# Patient Record
Sex: MALE | Race: WHITE | NOT HISPANIC OR LATINO | Employment: UNEMPLOYED | URBAN - METROPOLITAN AREA
[De-identification: names, ages, dates, MRNs, and addresses within clinical notes are randomized per-mention and may not be internally consistent; named-entity substitution may affect disease eponyms.]

---

## 2017-03-08 ENCOUNTER — HOSPITAL ENCOUNTER (OUTPATIENT)
Dept: CT IMAGING | Facility: HOSPITAL | Age: 62
Discharge: HOME | End: 2017-03-08
Payer: COMMERCIAL

## 2017-03-08 DIAGNOSIS — Z90.5 ACQUIRED ABSENCE OF KIDNEY: ICD-10-CM

## 2017-03-08 PROCEDURE — 74176 CT ABD & PELVIS W/O CONTRAST: CPT

## 2017-11-27 ENCOUNTER — TRANSCRIBE ORDERS (OUTPATIENT)
Dept: ADMINISTRATIVE | Facility: HOSPITAL | Age: 62
End: 2017-11-27

## 2017-11-27 DIAGNOSIS — C64.1 MALIGNANT NEOPLASM OF RIGHT KIDNEY, EXCEPT RENAL PELVIS (HCC): Primary | ICD-10-CM

## 2017-12-01 ENCOUNTER — HOSPITAL ENCOUNTER (OUTPATIENT)
Dept: CT IMAGING | Facility: CLINIC | Age: 62
Discharge: HOME | End: 2017-12-01
Payer: COMMERCIAL

## 2017-12-01 ENCOUNTER — GENERIC CONVERSION - ENCOUNTER (OUTPATIENT)
Dept: OTHER | Facility: OTHER | Age: 62
End: 2017-12-01

## 2017-12-01 DIAGNOSIS — C64.1 MALIGNANT NEOPLASM OF RIGHT KIDNEY, EXCEPT RENAL PELVIS (HCC): ICD-10-CM

## 2017-12-01 PROCEDURE — 74176 CT ABD & PELVIS W/O CONTRAST: CPT

## 2017-12-01 PROCEDURE — 71250 CT THORAX DX C-: CPT

## 2018-01-11 NOTE — RESULT NOTES
Verified Results  (1923 [facility]) Lipid Panel [id number] 07:30AM Maria Del Rosario Be     Test Name Result Flag Reference   Cholesterol, Total 198 mg/dL  100-199   Triglycerides 112 mg/dL  0-149   HDL Cholesterol 28 mg/dL L >39   According to ATP-III Guidelines, HDL-C >59 mg/dL is considered a  negative risk factor for CHD     VLDL Cholesterol Melvin 22 mg/dL  5-40   LDL Cholesterol Calc 148 mg/dL H 0-99

## 2018-01-12 NOTE — MISCELLANEOUS
History of Present Illness  TCM Communication [facility]: The patient is being contacted for follow-up after hospitalization and Spoke to PT's wife Kendell Avila on Tuesday Jan 19,2016  He was hospitalized at [address]  The date of admission: Jan 13,2016, date of discharge: Jan 18,2016  Diagnosis: Right renal mass  He was discharged to home  Medications were not reviewed today  He did not schedule a follow up appointment  Follow-up appointments with other specialists: Dr Agusto Duran in Feb  The patient is currently asymptomatic  Communication performed and completed by ROXANNE Waters   HPI: According to spouse - PT's doing OK - the only real problem is he's constipated is on docusate sodium 100mg 1 tab three times daily - he's on pain MED and can't move his bowels - I put a message back to the nurse to address this issue  No other problems or concerns at this time - PT was seen by Dr Emily Bansal for Pre- OP and he has a follow up scheduled with the Surgeon's office  Active Problems    1  Hypertension (401 9) (I10)   2  Hypothyroidism (244 9) (E03 9)   3  Lung nodule, solitary (793 11) (R91 1)   4  Mild obstructive sleep apnea (327 23) (G47 33)   5  Obesity (278 00) (E66 9)   6  Preoperative examination (V72 84) (Z01 818)   7  Renal mass (593 9) (N28 89)    Past Medical History    1  History of Cellulitis, trunk (682 2) (L03 319)   2  Gastroenteritis and colitis, viral (008 8) (A08 4)   3  History of Mouth ulcers (528 9) (K12 1)   4  History of Snoring (786 09) (R06 83)    Surgical History    1  History of Inguinal Hernia Repair    Family History    1  Family history of Lung cancer   2  Family history of Melanoma   3  Family history of Renal insufficiency    Social History    · Caffeine use (V49 89) (F15 90)   · Cultural background   · Former smoker (V04 22) (N21 272)   ·    · Occasional alcohol use   · Primary spoken language English   · Racial background    Current Meds   1   Levoxyl 100 MCG Oral Tablet; TAKE 1 TABLET DAILY; Therapy: 06UKD3553 to (Evaluate:10Oda4508)  Requested for: 09VST8986; Last   Rx:08Jan2016 Ordered   2  Lisinopril-Hydrochlorothiazide 10-12 5 MG Oral Tablet; take one tablet by mouth daily; Therapy: 75SHC7355 to (Last Rx:05Jan2016)  Requested for: 17HMJ8026 Ordered   3  Metoprolol Tartrate 25 MG Oral Tablet; take one tablet by mouth twice daily; Therapy: 59VPE1830 to (Last Rx:05Jan2016)  Requested for: 22KMZ1438 Ordered    Allergies    1  No Known Drug Allergies    Future Appointments    Date/Time Provider Specialty Site   02/05/2016 10:30 AM MEERA [place]   Urology [address]     Signatures   Electronically signed by : MEERA Soni ; Jan 19 2016  7:38PM EST

## 2018-01-12 NOTE — MISCELLANEOUS
Message  Message Free Text Note Form: Patient had kidney resection two weeks ago at [facility]  Patient has had  persistent nausea and vomiting since that time  Can only keep liquids down  I spoke to  the patient's wife and advised him to go back to the ER at [facility] for evaluation  I believe he needs CT scan of the abdomen         Signatures   Electronically signed by : MEERA Preston ; Sep 26 2016  8:26AM EST

## 2018-01-13 NOTE — MISCELLANEOUS
Message  Return to work or school:    He is able to return to work on  02/22/2016       Caitlin Renteria MD       Signatures   Electronically signed by : MEERA Blancas ; Feb 14 2016  9:43AM EST

## 2018-01-14 NOTE — PROCEDURES
Procedures by Pallavi Bhatt PA-C at 1/15/2016  9:58 AM      Author:  Pallavi Bhatt PA-C Service:  Urology Author Type:  Physician Assistant    Filed:  1/15/2016 10:02 AM Date of Service:  1/15/2016  9:58 AM Status:  Attested    :  Pallavi Bhatt PA-C (Physician Assistant)  Cosigner:  Caitlin Renteria MD at 1/15/2016  2:45 PM      Pre-procedure Diagnoses:       1  H/O partial nephrectomy [Z98 89]                Post-procedure Diagnoses:       1  H/O partial nephrectomy [Z98 89]                Procedures:       1  DRAIN CARE [UYG618 (Custom)]              Attestation signed by Caitlin Renteria MD at 1/15/2016  2:45 PM           Agree with above  Called to d/c MARCIA drain  MARCIA drain removed without difficulty  DSD applied  Patient tolerated procedure well  Patient and wife instructed in routine drain site care              Received for:Mee Pizano [facility]  Alex 15 2016  2:45PM [facility] Standard Time

## 2018-01-16 NOTE — PROGRESS NOTES
Plan  Renal mass    · (1) BASIC METABOLIC PROFILE; Status:Active; Requested for:05May2016;    Perform:[facility]; UCN:[id number];RGHOPOR; For:Renal mass; Ordered By:Robert Meredith;   · CT ABDOMEN W WO CONTRAST; Status:Need Information - Financial Authorization; Requested for:08Qev0453;    Perform:[facility]; KWC:74XMN6947;ESUWMDV; For:Renal mass; Ordered By:Robert Meredith;  Renal mass, S/p nephrectomy    · Urine Dip Non-Automated- POC; Status:Complete - Retrospective By Protocol  Authorization;   Done: 77NWI6007 10:58AM   Performed: In Office; GFM:25JOC3461; Last Updated Bryant Roman; 2/5/2016 10:59:38 AM;Ordered; For:Renal mass, S/p nephrectomy; Ordered By:Ehsan Meredith; Discussion/Summary  Discussion Summary:   65-year-old male with atypical renal cysts status post right robotic partial nephrectomy  I reviewed the pathology with the patient  The final pathology showed atypical renal cysts  There was no evidence of malignancy  We will obtain a CT scan in 3 months  If this is normal we will follow up with one more CT scan in a year and discontinue any further follow-up if that looks fine as well  Chief Complaint  Chief Complaint Free Text Note Form: Patient presents for f/u s/p Right partial nephrectomy      History of Present Illness  HPI: 65-year-old male presents for follow-up after right robotic partial nephrectomy  He did well after the procedure  He had some constipation but otherwise no problems  He is tolerating regular diet with normal bowel movements now  Review of Systems  Complete-Male Urology:   Genitourinary: Empty sensation and stream quality good, but no dysuria, no urinary hesitancy, no hematuria and no feelings of urinary urgency    The patient presents with complaints of nocturia (1x)  Active Problems    1  Hypertension (401 9) (I10)   2  Hypothyroidism (244 9) (E03 9)   3  Lung nodule, solitary (793 11) (R91 1)   4   Mild obstructive sleep apnea (327 23) (G47 33) 5  Obesity (278 00) (E66 9)   6  Preoperative examination (V72 84) (Z01 818)   7  Renal mass (593 9) (N28 89)   8  S/p nephrectomy (V45 73) (Z90 5)    Past Medical History    1  History of Cellulitis, trunk (682 2) (L03 319)   2  Gastroenteritis and colitis, viral (008 8) (A08 4)   3  History of Mouth ulcers (528 9) (K12 1)   4  History of Snoring (786 09) (R06 83)    Surgical History    1  History of Inguinal Hernia Repair    Family History    1  Family history of Lung cancer   2  Family history of Melanoma   3  Family history of Renal insufficiency    Social History    · Caffeine use (V49 89) (F15 90)   · Cultural background   · Former smoker (M19 45) (S53 954)   ·    · Occasional alcohol use   · Primary spoken language English   · Racial background    Current Meds   1  Levoxyl 100 MCG Oral Tablet; TAKE 1 TABLET DAILY; Therapy: 67FYG5687 to (Evaluate:71Hqp1875)  Requested for: 59BUQ6891; Last   Rx:08Jan2016 Ordered   2  Lisinopril-Hydrochlorothiazide 10-12 5 MG Oral Tablet; take one tablet by mouth daily; Therapy: 92DVY3819 to (Last Rx:05Jan2016)  Requested for: 49MTV8993 Ordered   3  Metoprolol Tartrate 25 MG Oral Tablet; take one tablet by mouth twice daily; Therapy: 54OJM5922 to (Last Rx:05Jan2016)  Requested for: 64LXM5085 Ordered    Allergies    1  No Known Drug Allergies    Vitals  Vital Signs [Data Includes: Current Encounter]    Recorded: 38EZD3363 10:56AM   Heart Rate 64   Systolic 010   Diastolic 70   Height 5 ft 9 in   Weight 239 lb    BMI Calculated 35 29   BSA Calculated 2 23     Physical Exam    Constitutional   General appearance: No acute distress, well appearing and well nourished  Pulmonary   Respiratory effort: No increased work of breathing or signs of respiratory distress  Cardiovascular   Examination of extremities for edema and/or varicosities: Normal     Abdomen   Abdomen: Abnormal   Incisions healing well  Abdomen is soft, nondistended, and nontender     Liver and spleen: No hepatomegaly or splenomegaly      Musculoskeletal   Gait and station: Normal     Lymphatic   Palpation of lymph nodes in groin: Normal     Additional Exam:  Neuro exam nonfocal       Results/Data  Encounter Results   Urine Dip Non-Automated- POC 78BFN4551 10:58AM Shankar Barthel     Test Name Result Flag Reference   Color Yellow     Clarity Transparent     Leukocytes -     Nitrite -     Blood small     Protein -     Ph 5 0     Specific Gravity 1 010     Ketone -     Glucose -         Signatures   Electronically signed by : MEERA Orellana ; Feb 5 2016 11:17AM EST                       (Author)

## 2018-01-27 DIAGNOSIS — I10 ESSENTIAL HYPERTENSION: Primary | ICD-10-CM

## 2018-01-27 RX ORDER — LISINOPRIL AND HYDROCHLOROTHIAZIDE 12.5; 1 MG/1; MG/1
1 TABLET ORAL DAILY
Qty: 30 TABLET | Refills: 5 | Status: SHIPPED | OUTPATIENT
Start: 2018-01-27 | End: 2018-02-05 | Stop reason: SDUPTHER

## 2018-02-05 DIAGNOSIS — I10 ESSENTIAL HYPERTENSION: ICD-10-CM

## 2018-02-05 RX ORDER — LISINOPRIL AND HYDROCHLOROTHIAZIDE 12.5; 1 MG/1; MG/1
1 TABLET ORAL DAILY
Qty: 30 TABLET | Refills: 5 | Status: SHIPPED | OUTPATIENT
Start: 2018-02-05 | End: 2018-11-03 | Stop reason: SDUPTHER

## 2018-02-21 ENCOUNTER — OFFICE VISIT (OUTPATIENT)
Dept: FAMILY MEDICINE CLINIC | Facility: HOSPITAL | Age: 63
End: 2018-02-21
Payer: COMMERCIAL

## 2018-02-21 VITALS
RESPIRATION RATE: 16 BRPM | DIASTOLIC BLOOD PRESSURE: 68 MMHG | HEART RATE: 73 BPM | BODY MASS INDEX: 34.8 KG/M2 | SYSTOLIC BLOOD PRESSURE: 130 MMHG | WEIGHT: 235 LBS | HEIGHT: 69 IN

## 2018-02-21 DIAGNOSIS — D49.2 SKIN TUMOR: Primary | ICD-10-CM

## 2018-02-21 PROCEDURE — 3008F BODY MASS INDEX DOCD: CPT | Performed by: INTERNAL MEDICINE

## 2018-02-21 PROCEDURE — 99213 OFFICE O/P EST LOW 20 MIN: CPT | Performed by: INTERNAL MEDICINE

## 2018-02-21 NOTE — PROGRESS NOTES
Assessment/Plan:    No problem-specific Assessment & Plan notes found for this encounter  Diagnoses and all orders for this visit:    Skin tumor  -     Ambulatory referral to Dermatology; Future          Subjective:      Patient ID: Lito Bryan is a 60 y o  male  Rash   This is a new problem  The current episode started more than 1 year ago  The problem has been gradually worsening since onset  Pertinent negatives include no congestion, cough, diarrhea, fever, shortness of breath or vomiting  The following portions of the patient's history were reviewed and updated as appropriate: current medications, past family history, past medical history, past social history, past surgical history and problem list     Review of Systems   Constitutional: Negative for fever  HENT: Negative for congestion  Respiratory: Negative for cough, shortness of breath and wheezing  Cardiovascular: Negative for chest pain, palpitations and leg swelling  Gastrointestinal: Negative for abdominal pain, blood in stool, diarrhea, nausea and vomiting  Endocrine: Negative for polydipsia and polyphagia  Genitourinary: Negative for difficulty urinating and dysuria  Musculoskeletal: Negative for joint swelling and neck stiffness  Skin: Positive for rash  Neurological: Negative for weakness, numbness and headaches  Hematological: Does not bruise/bleed easily  Psychiatric/Behavioral: Negative for dysphoric mood  All other systems reviewed and are negative  Objective:    /68   Pulse 73   Resp 16   Ht 5' 9" (1 753 m)   Wt 107 kg (235 lb)   BMI 34 70 kg/m²      Physical Exam   Constitutional: He appears well-developed  No distress  HENT:   Head: Normocephalic  Cardiovascular: Normal rate and regular rhythm  Pulmonary/Chest: No respiratory distress  He has no wheezes  He has no rales  Lymphadenopathy:        Left: No inguinal adenopathy present     Skin:   Lobulated Skin tumor with stalk on the left medial thigh           Jose Luis Ceja MD

## 2018-02-24 DIAGNOSIS — I10 ESSENTIAL HYPERTENSION: ICD-10-CM

## 2018-04-30 ENCOUNTER — TELEPHONE (OUTPATIENT)
Dept: FAMILY MEDICINE CLINIC | Facility: HOSPITAL | Age: 63
End: 2018-04-30

## 2018-04-30 NOTE — TELEPHONE ENCOUNTER
Spoke with patient's wife  Gave her Dr Thuy Galarza'  Instructions  Told her if no better he should be seen

## 2018-04-30 NOTE — TELEPHONE ENCOUNTER
Pt was mowing the yard yesterday for the first time this season  His shoulder really hurts  He has tried cream and tylenol but has not had much relief  Is there anything else he can do or take for the shoulder pain

## 2018-05-02 ENCOUNTER — HOSPITAL ENCOUNTER (EMERGENCY)
Facility: HOSPITAL | Age: 63
Discharge: HOME | End: 2018-05-02
Attending: EMERGENCY MEDICINE | Admitting: EMERGENCY MEDICINE
Payer: COMMERCIAL

## 2018-05-02 ENCOUNTER — APPOINTMENT (EMERGENCY)
Dept: RADIOLOGY | Facility: HOSPITAL | Age: 63
End: 2018-05-02
Payer: COMMERCIAL

## 2018-05-02 VITALS
OXYGEN SATURATION: 98 % | HEART RATE: 64 BPM | SYSTOLIC BLOOD PRESSURE: 101 MMHG | WEIGHT: 245 LBS | BODY MASS INDEX: 36.18 KG/M2 | RESPIRATION RATE: 17 BRPM | DIASTOLIC BLOOD PRESSURE: 63 MMHG | TEMPERATURE: 98 F

## 2018-05-02 DIAGNOSIS — J44.9 COPD (CHRONIC OBSTRUCTIVE PULMONARY DISEASE) (HCC): Primary | ICD-10-CM

## 2018-05-02 LAB
ALBUMIN SERPL BCP-MCNC: 3.9 G/DL (ref 3.5–5)
ALP SERPL-CCNC: 64 U/L (ref 46–116)
ALT SERPL W P-5'-P-CCNC: 34 U/L (ref 12–78)
ANION GAP SERPL CALCULATED.3IONS-SCNC: 11 MMOL/L (ref 4–13)
AST SERPL W P-5'-P-CCNC: 16 U/L (ref 5–45)
ATRIAL RATE: 65 BPM
BASE EXCESS BLDA CALC-SCNC: 0 MMOL/L (ref -2–3)
BASOPHILS # BLD AUTO: 0.03 THOUSANDS/ΜL (ref 0–0.1)
BASOPHILS NFR BLD AUTO: 0 % (ref 0–1)
BILIRUB SERPL-MCNC: 0.4 MG/DL (ref 0.2–1)
BUN SERPL-MCNC: 27 MG/DL (ref 5–25)
CALCIUM SERPL-MCNC: 8.4 MG/DL (ref 8.3–10.1)
CHLORIDE SERPL-SCNC: 104 MMOL/L (ref 100–108)
CO2 SERPL-SCNC: 26 MMOL/L (ref 21–32)
CREAT SERPL-MCNC: 1.88 MG/DL (ref 0.6–1.3)
DEPRECATED D DIMER PPP: 309 NG/ML (FEU) (ref 0–424)
EOSINOPHIL # BLD AUTO: 0.16 THOUSAND/ΜL (ref 0–0.61)
EOSINOPHIL NFR BLD AUTO: 2 % (ref 0–6)
ERYTHROCYTE [DISTWIDTH] IN BLOOD BY AUTOMATED COUNT: 13.8 % (ref 11.6–15.1)
FIO2 GAS DIL.REBREATH: 21 L
GFR SERPL CREATININE-BSD FRML MDRD: 37 ML/MIN/1.73SQ M
GLUCOSE SERPL-MCNC: 106 MG/DL (ref 65–140)
HCO3 BLDA-SCNC: 24.4 MMOL/L (ref 22–28)
HCT VFR BLD AUTO: 46 % (ref 36.5–49.3)
HGB BLD-MCNC: 14.9 G/DL (ref 12–17)
LYMPHOCYTES # BLD AUTO: 1.66 THOUSANDS/ΜL (ref 0.6–4.47)
LYMPHOCYTES NFR BLD AUTO: 19 % (ref 14–44)
MCH RBC QN AUTO: 29.1 PG (ref 26.8–34.3)
MCHC RBC AUTO-ENTMCNC: 32.4 G/DL (ref 31.4–37.4)
MCV RBC AUTO: 90 FL (ref 82–98)
MONOCYTES # BLD AUTO: 0.98 THOUSAND/ΜL (ref 0.17–1.22)
MONOCYTES NFR BLD AUTO: 11 % (ref 4–12)
NEUTROPHILS # BLD AUTO: 5.92 THOUSANDS/ΜL (ref 1.85–7.62)
NEUTS SEG NFR BLD AUTO: 68 % (ref 43–75)
NT-PROBNP SERPL-MCNC: 101 PG/ML
P AXIS: 81 DEGREES
PCO2 BLD: 25 MMOL/L (ref 21–32)
PCO2 BLD: 37.2 MM HG (ref 36–44)
PH BLD: 7.42 [PH] (ref 7.35–7.45)
PLATELET # BLD AUTO: 254 THOUSANDS/UL (ref 149–390)
PMV BLD AUTO: 11 FL (ref 8.9–12.7)
PO2 BLD: 64 MM HG (ref 75–129)
POTASSIUM SERPL-SCNC: 4.4 MMOL/L (ref 3.5–5.3)
PR INTERVAL: 166 MS
PROT SERPL-MCNC: 7.4 G/DL (ref 6.4–8.2)
QRS AXIS: 270 DEGREES
QRSD INTERVAL: 106 MS
QT INTERVAL: 404 MS
QTC INTERVAL: 420 MS
RBC # BLD AUTO: 5.12 MILLION/UL (ref 3.88–5.62)
SAO2 % BLD FROM PO2: 93 % (ref 95–98)
SODIUM SERPL-SCNC: 141 MMOL/L (ref 136–145)
SPECIMEN SOURCE: ABNORMAL
T WAVE AXIS: 68 DEGREES
TROPONIN I SERPL-MCNC: <0.02 NG/ML
VENTRICULAR RATE: 65 BPM
WBC # BLD AUTO: 8.75 THOUSAND/UL (ref 4.31–10.16)

## 2018-05-02 PROCEDURE — 36415 COLL VENOUS BLD VENIPUNCTURE: CPT | Performed by: EMERGENCY MEDICINE

## 2018-05-02 PROCEDURE — 93005 ELECTROCARDIOGRAM TRACING: CPT

## 2018-05-02 PROCEDURE — 36600 WITHDRAWAL OF ARTERIAL BLOOD: CPT

## 2018-05-02 PROCEDURE — 99285 EMERGENCY DEPT VISIT HI MDM: CPT

## 2018-05-02 PROCEDURE — 93010 ELECTROCARDIOGRAM REPORT: CPT | Performed by: INTERNAL MEDICINE

## 2018-05-02 PROCEDURE — 80053 COMPREHEN METABOLIC PANEL: CPT | Performed by: EMERGENCY MEDICINE

## 2018-05-02 PROCEDURE — 82803 BLOOD GASES ANY COMBINATION: CPT

## 2018-05-02 PROCEDURE — 71045 X-RAY EXAM CHEST 1 VIEW: CPT

## 2018-05-02 PROCEDURE — 94640 AIRWAY INHALATION TREATMENT: CPT

## 2018-05-02 PROCEDURE — 85379 FIBRIN DEGRADATION QUANT: CPT | Performed by: EMERGENCY MEDICINE

## 2018-05-02 PROCEDURE — 85025 COMPLETE CBC W/AUTO DIFF WBC: CPT | Performed by: EMERGENCY MEDICINE

## 2018-05-02 PROCEDURE — 84484 ASSAY OF TROPONIN QUANT: CPT | Performed by: EMERGENCY MEDICINE

## 2018-05-02 PROCEDURE — 83880 ASSAY OF NATRIURETIC PEPTIDE: CPT | Performed by: EMERGENCY MEDICINE

## 2018-05-02 RX ORDER — PREDNISONE 20 MG/1
40 TABLET ORAL DAILY
Qty: 8 TABLET | Refills: 0 | Status: SHIPPED | OUTPATIENT
Start: 2018-05-02 | End: 2018-05-06

## 2018-05-02 RX ORDER — ALBUTEROL SULFATE 2.5 MG/3ML
5 SOLUTION RESPIRATORY (INHALATION) ONCE
Status: COMPLETED | OUTPATIENT
Start: 2018-05-02 | End: 2018-05-02

## 2018-05-02 RX ADMIN — IPRATROPIUM BROMIDE 0.5 MG: 0.5 SOLUTION RESPIRATORY (INHALATION) at 13:39

## 2018-05-02 RX ADMIN — ALBUTEROL SULFATE 5 MG: 2.5 SOLUTION RESPIRATORY (INHALATION) at 13:39

## 2018-05-02 NOTE — ED PROCEDURE NOTE
PROCEDURE  ECG 12 Lead Documentation  Date/Time: 5/2/2018 1:45 PM  Performed by: Marsha Conroy  Authorized by: Marsha Conroy     ECG reviewed by me, the ED Provider: yes    Patient location:  ED  Previous ECG:     Comparison to cardiac monitor: No    Interpretation:     Interpretation: abnormal    Rate:     ECG rate:  65    ECG rate assessment: normal    Rhythm:     Rhythm: sinus rhythm    Ectopy:     Ectopy: none    QRS:     QRS axis:  Right  Conduction:     Conduction: abnormal      Abnormal conduction: incomplete RBBB    ST segments:     ST segments:  Normal  T waves:     T waves: normal    Comments:      Yanira Tena MD  05/02/18 7242

## 2018-05-02 NOTE — ED NOTES
Appears comfortable  Patient states that he stopped smoking 4 years ago  States that he has not been told that he has COPD   Has noted periods of SOB in the past      Kayleigh Sher RN  05/02/18 0567

## 2018-05-02 NOTE — ED PROVIDER NOTES
History  Chief Complaint   Patient presents with    Shortness of Breath     To ED with c/o worsening SOB for 30 minutes while working on stairs  Denies any recent illness, cough, swelling, CP  Pt c/o SOB today, feels like he can't catch his breath; no c/o pain  Started 1 hour ago  History provided by:  Patient   used: No    Shortness of Breath   Severity:  Moderate  Onset quality:  Sudden  Duration:  1 hour  Timing:  Constant  Progression:  Unchanged  Chronicity:  New  Relieved by:  Nothing  Worsened by:  Exertion  Ineffective treatments:  None tried      Prior to Admission Medications   Prescriptions Last Dose Informant Patient Reported? Taking?   levothyroxine (LEVOTHROID) 88 mcg tablet   Yes No   Sig: Take 88 mcg by mouth daily     lisinopril-hydrochlorothiazide (PRINZIDE,ZESTORETIC) 10-12 5 MG per tablet   No No   Sig: take 1 tablet by mouth daily   metoprolol tartrate (LOPRESSOR) 25 mg tablet   No No   Sig: take 1 tablet by mouth twice a day      Facility-Administered Medications: None       Past Medical History:   Diagnosis Date    Cellulitis, trunk     Disease of thyroid gland     Gastroenteritis and colitis, viral     last assessed: 11/24/2015    Hernia     Hypertension     Lung mass     Renal mass        Past Surgical History:   Procedure Laterality Date    ABDOMINAL ADHESION SURGERY N/A 1/13/2016    Procedure: LYSIS ADHESIONS;  Surgeon: Nancy Guillen MD;  [facility]: [facility];  Service:     INGUINAL HERNIA REPAIR      LYMPH NODE DISSECTION N/A 1/13/2016    Procedure: DISSECTION/STAGING LYMPH NODE PELVIS/ABDOMEN;  Surgeon: Nancy Guillen MD;  Location: [facility];  Service:     DC LAP,PARTIAL NEPHRECTOMY Right 1/13/2016    Procedure: NEPHRECTOMY PARTIAL LAPAROSCOPIC W ROBOTICS, intra op ultrasound,laparoscopic lysis of adhesions;  Surgeon: Nancy Guillen MD;  Location: [facility];  Service: Urology       Family History   Problem Relation Age of Onset    Lung cancer Father     Melanoma Father     Other Father      renal insufficiency    Dementia Mother     Mental illness Neg Hx     Substance Abuse Neg Hx      I have reviewed and agree with the history as documented  Social History   Substance Use Topics    Smoking status: Former Smoker    Smokeless tobacco: Former User      Comment: not in last 12 months  quit smoking arnd 6/2013, used to smoke about 1 pack per week    Alcohol use Yes      Comment: social  ocasional        Review of Systems   Respiratory: Positive for shortness of breath  All other systems reviewed and are negative  Physical Exam  ED Triage Vitals   Temperature Pulse Respirations Blood Pressure SpO2   05/02/18 1305 05/02/18 1305 05/02/18 1305 05/02/18 1305 05/02/18 1305   98 °F (36 7 °C) 67 20 125/64 98 %      Temp Source Heart Rate Source Patient Position - Orthostatic VS BP Location FiO2 (%)   05/02/18 1305 05/02/18 1305 05/02/18 1305 05/02/18 1305 --   Temporal Monitor Sitting Right arm       Pain Score       05/02/18 1300       No Pain           Orthostatic Vital Signs  Vitals:    05/02/18 1400 05/02/18 1415 05/02/18 1430 05/02/18 1445   BP: 121/70 111/70 95/52 101/63   Pulse: 64 65 66 64   Patient Position - Orthostatic VS:           Physical Exam   Constitutional: He is oriented to person, place, and time  He appears well-developed and well-nourished  HENT:   Nose: Nose normal    Eyes: Pupils are equal, round, and reactive to light  Neck: Normal range of motion  Cardiovascular: Normal rate and regular rhythm  Pulmonary/Chest: Effort normal and breath sounds normal    Abdominal: Soft  Neurological: He is alert and oriented to person, place, and time  Skin: Skin is warm and dry  Psychiatric: He has a normal mood and affect  Judgment and thought content normal    Nursing note and vitals reviewed        ED Medications  Medications   albuterol inhalation solution 5 mg (5 mg Nebulization Given 5/2/18 7484)   ipratropium (ATROVENT) 0 02 % inhalation solution 0 5 mg (0 5 mg Nebulization Given 5/2/18 7689)       Diagnostic Studies  Results Reviewed     Procedure Component Value Units Date/Time    B-type natriuretic peptide [[id number]]  (Normal) Collected:  05/02/18 1312    Lab Status:  Final result Specimen:  Blood from Arm, Left Updated:  05/02/18 1346     NT-proBNP 101 pg/mL     Comprehensive metabolic panel [[id number]]  (Abnormal) Collected:  05/02/18 1312    Lab Status:  Final result Specimen:  Blood from Arm, Left Updated:  05/02/18 1344     Sodium 141 mmol/L      Potassium 4 4 mmol/L      Chloride 104 mmol/L      CO2 26 mmol/L      Anion Gap 11 mmol/L      BUN 27 (H) mg/dL      Creatinine 1 88 (H) mg/dL      Glucose 106 mg/dL      Calcium 8 4 mg/dL      AST 16 U/L      ALT 34 U/L      Alkaline Phosphatase 64 U/L      Total Protein 7 4 g/dL      Albumin 3 9 g/dL      Total Bilirubin 0 40 mg/dL      eGFR 37 ml/min/1 73sq m     Narrative:         National Kidney Disease Education Program recommendations are as follows:  GFR calculation is accurate only with a steady state creatinine  Chronic Kidney disease less than 60 ml/min/1 73 sq  meters  Kidney failure less than 15 ml/min/1 73 sq  meters  Troponin I [[id number]]  (Normal) Collected:  05/02/18 1312    Lab Status:  Final result Specimen:  Blood from Arm, Left Updated:  05/02/18 1342     Troponin I <0 02 ng/mL     Narrative:         Siemens Chemistry analyzer 99% cutoff is > 0 04 ng/mL in network labs    o cTnI 99% cutoff is useful only when applied to patients in the clinical setting of myocardial ischemia  o cTnI 99% cutoff should be interpreted in the context of clinical history, ECG findings and possibly cardiac imaging to establish correct diagnosis  o cTnI 99% cutoff may be suggestive but clearly not indicative of a coronary event without the clinical setting of myocardial ischemia      D-Dimer [[id number]]  (Normal) Collected:  05/02/18 1312    Lab Status:  Final result Specimen:  Blood from Arm, Left Updated:  05/02/18 1340     D-Dimer, Quant 309 ng/ml (FEU)     POCT Blood Gas (G3+) [[id number]]  (Abnormal) Collected:  05/02/18 1333    Lab Status:  Final result Updated:  05/02/18 1337     pH, Art i-STAT 7 424     pCO2, Art i-STAT 37 2 mm HG      pO2, ART i-STAT 64 0 (L) mm HG      BE, i-STAT 0 mmol/L      HCO3, Art i-STAT 24 4 mmol/L      CO2, i-STAT 25 mmol/L      O2 Sat, i-STAT 93 (L) %      POC FIO2 21 L      Specimen Type ARTERIAL    CBC and differential [[id number]]  (Normal) Collected:  05/02/18 1312    Lab Status:  Final result Specimen:  Blood from Arm, Left Updated:  05/02/18 1322     WBC 8 75 Thousand/uL      RBC 5 12 Million/uL      Hemoglobin 14 9 g/dL      Hematocrit 46 0 %      MCV 90 fL      MCH 29 1 pg      MCHC 32 4 g/dL      RDW 13 8 %      MPV 11 0 fL      Platelets 794 Thousands/uL      Neutrophils Relative 68 %      Lymphocytes Relative 19 %      Monocytes Relative 11 %      Eosinophils Relative 2 %      Basophils Relative 0 %      Neutrophils Absolute 5 92 Thousands/µL      Lymphocytes Absolute 1 66 Thousands/µL      Monocytes Absolute 0 98 Thousand/µL      Eosinophils Absolute 0 16 Thousand/µL      Basophils Absolute 0 03 Thousands/µL                  XR chest 1 view portable    (Results Pending)              Procedures  Procedures       Phone Contacts  ED Phone Contact    ED Course                               MDM  Number of Diagnoses or Management Options  COPD (chronic obstructive pulmonary disease) ([facility] 75 ): new and requires workup     Amount and/or Complexity of Data Reviewed  Clinical lab tests: reviewed and ordered  Tests in the radiology section of CPT®: ordered and reviewed    Patient Progress  Patient progress: stable    CritCare Time    Disposition  Final diagnoses:   [facility] ([facility] )     Time reflects when diagnosis was documented in both MDM as applicable and the Disposition within this note     Time User Action Codes Description Comment    5/2/2018  2:50 PM Caitlinrandellviki Dolanjoan MACIAS Add [J44 9] COPD (chronic obstructive pulmonary disease) [facility])       ED Disposition     ED Disposition Condition Comment    Discharge  Alisson Briceño discharge to home/self care  Condition at discharge: Stable        Follow-up Information     Follow up With Specialties Details Why [address] Veda Pulido MD Internal Medicine Call today for follow-up Raphaelosito  [address] [facility]  [id number] [place] [address] [address] [facility]          Discharge Medication List as of 5/2/2018  2:53 PM      START taking these medications    Details   ipratropium-albuterol (COMBIVENT RESPIMAT) inhaler Inhale 1 puff 4 (four) times a day, Starting Wed 5/2/2018, Normal      predniSONE 20 mg tablet Take 2 tablets (40 mg total) by mouth daily for 4 days, Starting Wed 5/2/2018, Until Sun 5/6/2018, Normal         CONTINUE these medications which have NOT CHANGED    Details   levothyroxine (LEVOTHROID) 88 mcg tablet Take 88 mcg by mouth daily  , Until Discontinued, Historical Med      lisinopril-hydrochlorothiazide (PRINZIDE,ZESTORETIC) 10-12 5 MG per tablet take 1 tablet by mouth daily, Starting Mon 2/5/2018, Normal      metoprolol tartrate (LOPRESSOR) 25 mg tablet take 1 tablet by mouth twice a day, Normal           No discharge procedures on file      ED Provider  Electronically Signed by           Buddy Sweet MD  05/02/18 560

## 2018-05-02 NOTE — ED NOTES
Patient is resting comfortably  Nebulizer completed  Apptears more comfortable  Denies any CP at this time and feels less SOB       Ben Bauman RN  05/02/18 3862

## 2018-05-02 NOTE — DISCHARGE INSTRUCTIONS
COPD (Chronic Obstructive Pulmonary Disease)   WHAT YOU NEED TO KNOW:   Chronic obstructive pulmonary disease (COPD) is a lung disease that makes it hard for you to breathe  It is usually a result of lung damage caused by years of irritation and inflammation in your lungs  DISCHARGE INSTRUCTIONS:   Call 911 if:   · You feel lightheaded, short of breath, and have chest pain  Return to the emergency department if:   · You are confused, dizzy, or feel faint  · Your arm or leg feels warm, tender, and painful  It may look swollen and red  · You cough up blood  Contact your healthcare provider if:   · You have more shortness of breath than usual      · You need more medicine than usual to control your symptoms  · You are coughing or wheezing more than usual      · You are coughing up more mucus, or it is a different color or has a different odor  · You gain more than 3 pounds in a week  · You have a fever, a runny or stuffy nose, and a sore throat, or other cold or flu symptoms  · Your skin, lips, or nails start to turn blue  · You have swelling in your legs or ankles  · You are very tired or weak for more than a day  · You notice changes in your mood, or changes in your ability to think or concentrate  · You have questions or concerns about your condition or care  Medicines:   · Medicines  may be used to open your airways, decrease swelling and inflammation in your lungs, or treat an infection  You may need 2 or more medicines  A short-acting medicine relieves symptoms quickly  Long-acting medicines will control or prevent symptoms  Ask for more information about the medicines you are given and how to use them safely  · Take your medicine as directed  Contact your healthcare provider if you think your medicine is not helping or if you have side effects  Tell him or her if you are allergic to any medicine  Keep a list of the medicines, vitamins, and herbs you take  Include the amounts, and when and why you take them  Bring the list or the pill bottles to follow-up visits  Carry your medicine list with you in case of an emergency  Help make breathing easier:   · Use pursed-lip breathing any time you feel short of breath  Take a deep breath in through your nose  Slowly breathe out through your mouth with your lips pursed for twice as long as you inhaled  You can also practice this breathing pattern while you bend, lift, climb stairs, or exercise  It slows down your breathing and helps move more air in and out of your lungs  · Do not smoke, and avoid others who smoke  Nicotine and other substances can cause lung irritation or damage and make it harder for you to breathe  Do not use e-cigarettes or smokeless tobacco  They still contain nicotine  Ask your healthcare provider for information if you currently smoke and need help to quit  For support and more information:  ¨ [employer]  Phone: 3- 019 - 042-8514  Web Address: [employer]      · Be aware of and avoid anything that makes your symptoms worse  Stay out of high altitudes and places with high humidity  Stay inside, or cover your mouth and nose with a scarf when you are outside during cold weather  Stay inside on days when air pollution or pollen counts are high  Do not use aerosol sprays such as deodorant, bug spray, and hair spray  Manage COPD and help prevent exacerbations:  COPD is a serious condition that gets worse over time  A COPD exacerbation means your symptoms suddenly get worse  It is important to prevent exacerbations  An exacerbation can cause more lung damage  COPD cannot be cured, but you can take action to feel better and prevent COPD exacerbations:  · Protect yourself from germs  Germs can get into your lungs and cause an infection  An infection in your lungs can create more mucus and make it harder to breathe   An infection can also create swelling in your airways and prevent air from getting in  You can decrease your risk for infection by doing the following:     [facility] your hands often with soap and water  Carry germ-killing gel with you  You can use the gel to clean your hands when soap and water are not available  ¨ Do not touch your eyes, nose, or mouth unless you have washed your hands first      ¨ Always cover your mouth when you cough  Cough into a tissue or your shirtsleeve so you do not spread germs from your hands  ¨ Try to avoid people who have a cold or the flu  If you are sick, stay away from others as much as possible  · Drink more liquids  This will help to keep your air passages moist and help you cough up mucus  Ask how much liquid to drink each day and which liquids are best for you  · Exercise daily  Exercise for at least 20 minutes each day to help increase your energy and decrease shortness of breath  Walking or riding a bike are good ways to exercise  Talk to your healthcare provider about the best exercise plan for you  · Ask about vaccines  Your healthcare provider may recommend that you get regular flu and pneumonia vaccines  Pneumonia can become life-threatening for a person who has COPD  Ask about other vaccines you may need  Ask your healthcare provider about the flu and pneumonia vaccines  All adults should get the flu (influenza) vaccine every year as soon as it becomes available  The pneumonia vaccine is given to adults aged 72 or older to prevent pneumococcal disease, such as pneumonia  Adults aged 23 to 59 years who are at high risk for pneumococcal disease also should get the pneumococcal vaccine  It may need to be repeated 1 or 5 years later  Pulmonary rehabilitation:  Your healthcare provider may recommend a program to help you manage your symptoms and improve your quality of life  It may include nutritional counseling and exercise to strengthen your lungs     Make decisions about your choices for future treatment:  Ask for information about advanced medical directives and living lopez  These documents help you decide and write down your choices for treatment and end-of-life care  It is best to complete them when you feel well and can think clearly about your wishes  The information can then be kept for future use if you are in the hospital or become very ill  Follow up with your healthcare provider as directed: You may need more tests  Your healthcare provider may refer you to a pulmonary (lung) specialist  Write down your questions so you remember to ask them during your visits  © 2017 [facility] Information is for End User's use only and may not be sold, redistributed or otherwise used for commercial purposes  All illustrations and images included in CareNotes® are the copyrighted property of [employer] M [employer]  or [employer]  The above information is an  only  It is not intended as medical advice for individual conditions or treatments  Talk to your doctor, nurse or pharmacist before following any medical regimen to see if it is safe and effective for you    Prednisone once a day, use inhaler 4 x a day, call Dr Aiden Hawkins for follow-up

## 2018-05-02 NOTE — ED NOTES
Nebulizer treatment initiated by Respiratory Therapy  Patient appears in no acute distress  Awaiting results       Pennie Pedroza RN  05/02/18 [id number]

## 2018-07-05 DIAGNOSIS — E03.8 OTHER SPECIFIED HYPOTHYROIDISM: Primary | ICD-10-CM

## 2018-07-06 RX ORDER — LEVOTHYROXINE SODIUM 0.1 MG/1
TABLET ORAL
Qty: 30 TABLET | Refills: 5 | Status: SHIPPED | OUTPATIENT
Start: 2018-07-06 | End: 2019-07-19 | Stop reason: SDUPTHER

## 2018-11-03 DIAGNOSIS — I10 ESSENTIAL HYPERTENSION: ICD-10-CM

## 2018-11-04 RX ORDER — LISINOPRIL AND HYDROCHLOROTHIAZIDE 12.5; 1 MG/1; MG/1
TABLET ORAL
Qty: 30 TABLET | Refills: 5 | Status: SHIPPED | OUTPATIENT
Start: 2018-11-04 | End: 2019-11-16 | Stop reason: SDUPTHER

## 2018-12-05 ENCOUNTER — HOSPITAL ENCOUNTER (OUTPATIENT)
Dept: CT IMAGING | Facility: CLINIC | Age: 63
Discharge: HOME | End: 2018-12-05
Payer: COMMERCIAL

## 2018-12-05 ENCOUNTER — APPOINTMENT (OUTPATIENT)
Dept: RADIOLOGY | Facility: CLINIC | Age: 63
End: 2018-12-05
Payer: COMMERCIAL

## 2018-12-05 ENCOUNTER — TRANSCRIBE ORDERS (OUTPATIENT)
Dept: ADMINISTRATIVE | Facility: HOSPITAL | Age: 63
End: 2018-12-05

## 2018-12-05 DIAGNOSIS — C64.1 MALIGNANT NEOPLASM OF RIGHT KIDNEY, EXCEPT RENAL PELVIS (HCC): ICD-10-CM

## 2018-12-05 DIAGNOSIS — C64.1 MALIGNANT NEOPLASM OF RIGHT KIDNEY, EXCEPT RENAL PELVIS (HCC): Primary | ICD-10-CM

## 2018-12-05 PROCEDURE — 74176 CT ABD & PELVIS W/O CONTRAST: CPT

## 2018-12-05 PROCEDURE — 71046 X-RAY EXAM CHEST 2 VIEWS: CPT

## 2018-12-20 ENCOUNTER — TRANSCRIBE ORDERS (OUTPATIENT)
Dept: ADMINISTRATIVE | Facility: HOSPITAL | Age: 63
End: 2018-12-20

## 2018-12-20 DIAGNOSIS — N28.89 RENAL MASS: Primary | ICD-10-CM

## 2019-01-23 ENCOUNTER — HOSPITAL ENCOUNTER (EMERGENCY)
Facility: HOSPITAL | Age: 64
Discharge: HOME | End: 2019-01-23
Attending: EMERGENCY MEDICINE | Admitting: EMERGENCY MEDICINE
Payer: COMMERCIAL

## 2019-01-23 VITALS
WEIGHT: 244.71 LBS | RESPIRATION RATE: 20 BRPM | HEART RATE: 78 BPM | TEMPERATURE: 98.1 F | DIASTOLIC BLOOD PRESSURE: 70 MMHG | SYSTOLIC BLOOD PRESSURE: 152 MMHG | BODY MASS INDEX: 36.14 KG/M2 | OXYGEN SATURATION: 99 %

## 2019-01-23 DIAGNOSIS — H10.9 CONJUNCTIVITIS: Primary | ICD-10-CM

## 2019-01-23 PROCEDURE — 99283 EMERGENCY DEPT VISIT LOW MDM: CPT

## 2019-01-23 RX ORDER — TOBRAMYCIN 3 MG/ML
1 SOLUTION/ DROPS OPHTHALMIC ONCE
Status: COMPLETED | OUTPATIENT
Start: 2019-01-23 | End: 2019-01-23

## 2019-01-23 RX ORDER — TETRACAINE HYDROCHLORIDE 5 MG/ML
1 SOLUTION OPHTHALMIC ONCE
Status: COMPLETED | OUTPATIENT
Start: 2019-01-23 | End: 2019-01-23

## 2019-01-23 RX ADMIN — TOBRAMYCIN 1 DROP: 3 SOLUTION OPHTHALMIC at 08:52

## 2019-01-23 RX ADMIN — FLUORESCEIN SODIUM 1 STRIP: 1 STRIP OPHTHALMIC at 08:35

## 2019-01-23 RX ADMIN — TETRACAINE HYDROCHLORIDE 1 DROP: 5 SOLUTION OPHTHALMIC at 08:35

## 2019-01-23 NOTE — DISCHARGE INSTRUCTIONS
Use the eye drops as follows:  1 drop in the left eye every 2 hours while awake today and then 1 drop three times a day for 5 additional days  You need to follow up with an eye specialist in the next 5-7 days to have the pressure in your eye re-checked  The pressure today was 20 which is at the high end of normal     Conjunctivitis   WHAT YOU NEED TO KNOW:   Conjunctivitis, or pink eye, is inflammation of your conjunctiva  The conjunctiva is a thin tissue that covers the front of your eye and the back of your eyelids  The conjunctiva helps protect your eye and keep it moist  Conjunctivitis may be caused by bacteria, allergies, or a virus  If your conjunctivitis is caused by bacteria, it may get better on its own in about 7 days  Viral conjunctivitis can last up to 3 weeks  DISCHARGE INSTRUCTIONS:   Return to the emergency department if:   · You have worsening eye pain  · The swelling in your eye gets worse, even after treatment  · Your vision suddenly becomes worse or you cannot see at all  Contact your healthcare provider if:   · You develop a fever and ear pain  · You have tiny bumps or spots of blood on your eye  · You have questions or concerns about your condition or care  Manage your symptoms:   · Apply a cool compress  Wet a washcloth with cold water and place it on your eye  This will help decrease itching and irritation  · Do not wear contact lenses  They can irritate your eye  Throw away the pair you are using and ask when you can wear them again  Use a new pair of lenses when your healthcare provider says it is okay  · Avoid irritants  Stay away from smoke filled areas  Shield your eyes from wind and sun  · Flush your eye  You may need to flush your eye with saline to help decrease your symptoms  Ask for more information on how to flush your eye  Medicines:  Treatment depends on what is causing your conjunctivitis   You may be given any of the following:  · Allergy medicine  helps decrease itchy, red, swollen eyes caused by allergies  It may be given as a pill, eye drops, or nasal spray  · Antibiotics  may be needed if your conjunctivitis is caused by bacteria  This medicine may be given as a pill, eye drops, or eye ointment  · Take your medicine as directed  Contact your healthcare provider if you think your medicine is not helping or if you have side effects  Tell him or her if you are allergic to any medicine  Keep a list of the medicines, vitamins, and herbs you take  Include the amounts, and when and why you take them  Bring the list or the pill bottles to follow-up visits  Carry your medicine list with you in case of an emergency  Prevent the spread of conjunctivitis:   · Wash your hands with soap and water often  Wash your hands before and after you touch your eyes  Also wash your hands before you prepare or eat food and after you use the bathroom or change a diaper  · Avoid allergens  Try to avoid the things that cause your allergies, such as pets, dust, or grass  · Avoid contact with others  Do not share towels or washcloths  Try to stay away from others as much as possible  Ask when you can return to work or school  · Throw away eye makeup  The bacteria that caused your conjunctivitis can stay in eye makeup  Throw away mascara and other eye makeup  © 2017 [address] [employer] Information is for End User's use only and may not be sold, redistributed or otherwise used for commercial purposes  All illustrations and images included in CareNotes® are the copyrighted property of [employer] M [employer] Inc  or [employer]  The above information is an  only  It is not intended as medical advice for individual conditions or treatments  Talk to your doctor, nurse or pharmacist before following any medical regimen to see if it is safe and effective for you

## 2019-01-23 NOTE — ED PROVIDER NOTES
History  Chief Complaint   Patient presents with    Eye Problem     To ED with c/o redness and irritation in left eye since yesterday  Denies any itching  Here with eye irritation that started yesterday  Left eye is red and tearing - feels irritated  No foreign body sensation, no crusting  Denies eye pain / pressure, no ha, no n/v   denies f/c/s, no cough/congestion  Denies blurred or double vision, no hx of previous eye surgeries  No other c/o        History provided by:  Patient   used: No    Eye Problem   Location:  Left eye  Quality:  Tearing  Severity:  Mild  Onset quality:  Sudden  Timing:  Constant  Progression:  Worsening  Chronicity:  New  Context: not chemical exposure, not foreign body, not scratch and not smoke exposure    Relieved by:  None tried  Worsened by:  Nothing  Ineffective treatments:  None tried  Associated symptoms: discharge, redness and tearing    Associated symptoms: no blurred vision, no crusting, no facial rash, no headaches, no itching, no nausea, no photophobia, no swelling and no vomiting    Risk factors: recent URI        Prior to Admission Medications   Prescriptions Last Dose Informant Patient Reported?  Taking?   ipratropium-albuterol (COMBIVENT RESPIMAT) inhaler   No No   Sig: Inhale 1 puff 4 (four) times a day   levothyroxine 100 mcg tablet   No No   Sig: take 1 tablet by mouth daily   lisinopril-hydrochlorothiazide (PRINZIDE,ZESTORETIC) 10-12 5 MG per tablet   No No   Sig: take 1 tablet by mouth once daily   metoprolol tartrate (LOPRESSOR) 25 mg tablet   No No   Sig: take 1 tablet by mouth twice a day      Facility-Administered Medications: None       Past Medical History:   Diagnosis Date    Cellulitis, trunk     Disease of thyroid gland     Gastroenteritis and colitis, viral     last assessed: 11/24/2015    Hernia     Hypertension     Lung mass     Renal mass        Past Surgical History:   Procedure Laterality Date    ABDOMINAL ADHESION SURGERY N/A 1/13/2016    Procedure: LYSIS ADHESIONS;  Surgeon: Rob Silver MD;  [facility];  Service:     INGUINAL HERNIA REPAIR      LYMPH NODE DISSECTION N/A 1/13/2016    Procedure: DISSECTION/STAGING LYMPH NODE PELVIS/ABDOMEN;  Surgeon: Rob Silver MD;  [facility];  Service:     HI LAP,PARTIAL NEPHRECTOMY Right 1/13/2016    Procedure: NEPHRECTOMY PARTIAL LAPAROSCOPIC W ROBOTICS, intra op ultrasound,laparoscopic lysis of adhesions;  Surgeon: Rob Silver MD;  [facility];  Service: Urology       Family History   Problem Relation Age of Onset    Lung cancer Father     Melanoma Father     Other Father         renal insufficiency    Dementia Mother     Mental illness Neg Hx     Substance Abuse Neg Hx      I have reviewed and agree with the history as documented  Social History   Substance Use Topics    Smoking status: Former Smoker    Smokeless tobacco: Former User      Comment: not in last 12 months  quit smoking arnd 6/2013, used to smoke about 1 pack per week    Alcohol use Yes      Comment: social  ocasional        Review of Systems   Eyes: Positive for discharge and redness  Negative for blurred vision, photophobia and itching  Gastrointestinal: Negative for nausea and vomiting  Neurological: Negative for headaches  All other systems reviewed and are negative  Physical Exam  Physical Exam   Constitutional: He appears well-developed and well-nourished  HENT:   Nose: Nose normal    Eyes: Pupils are equal, round, and reactive to light  EOM are normal  Left eye exhibits chemosis  Left eye exhibits no discharge  Left conjunctiva is injected  Neck: Neck supple  Cardiovascular: Normal rate  Pulmonary/Chest: Effort normal    Abdominal: Soft  Musculoskeletal: He exhibits no deformity  Neurological: He is alert  Skin: Skin is warm  Psychiatric: He has a normal mood and affect  Nursing note and vitals reviewed        Vital Signs  ED Triage Vitals [01/23/19 0812]   Temperature Pulse Respirations Blood Pressure SpO2   98 1 °F (36 7 °C) 78 20 152/70 99 %      Temp Source Heart Rate Source Patient Position - Orthostatic VS BP Location FiO2 (%)   Tympanic Monitor Sitting Right arm --      Pain Score       3           Vitals:    01/23/19 0812   BP: 152/70   Pulse: 78   Patient Position - Orthostatic VS: Sitting       Visual Acuity  Visual Acuity      Most Recent Value   Visual acuity R eye is  20/30   Visual acuity Left eye is  20/20   Visual acuity in both eyes is  20/20   Wearing corrective eyewear/lenses? No   L Pupil Size (mm)  3   R Pupil Size (mm)  3          ED Medications  Medications   fluorescein sodium sterile ophthalmic strip 1 strip (1 strip Left Eye Given 1/23/19 0835)   tetracaine 0 5 % ophthalmic solution 1 drop (1 drop Left Eye Given 1/23/19 0835)   tobramycin (TOBREX) 0 3 % ophthalmic solution 1 drop (1 drop Left Eye Given 1/23/19 0284)       Diagnostic Studies  Results Reviewed     None                 No orders to display              Procedures  Procedures       Phone Contacts  ED Phone Contact    ED Course                               MDM  Number of Diagnoses or Management Options  Conjunctivitis: new and requires workup    CritCare Time    Disposition  Final diagnoses:   Conjunctivitis     Time reflects when diagnosis was documented in both MDM as applicable and the Disposition within this note     Time User Action Codes Description Comment    1/23/2019  8:44 AM Ketty BUTTS Add [H10 9] Conjunctivitis       ED Disposition     ED Disposition Condition Comment    Discharge  [address] [place] [address] discharge to home/self care  Condition at discharge: Good        Follow-up Information     Follow up With Specialties Details Why Elliott Ross MD Ophthalmology Schedule an appointment as soon as possible for a visit in 3-5 days to have your eye re-evaluated and the pressure checked  [address] [place]            Discharge Medication List as of 1/23/2019  8:46 AM      CONTINUE these medications which have NOT CHANGED    Details   ipratropium-albuterol (COMBIVENT RESPIMAT) inhaler Inhale 1 puff 4 (four) times a day, Starting Wed 5/2/2018, Normal      levothyroxine 100 mcg tablet take 1 tablet by mouth daily, Normal      lisinopril-hydrochlorothiazide (PRINZIDE,ZESTORETIC) 10-12 5 MG per tablet take 1 tablet by mouth once daily, Normal      metoprolol tartrate (LOPRESSOR) 25 mg tablet take 1 tablet by mouth twice a day, Normal           No discharge procedures on file      ED Provider  Electronically Signed by           Javy Antonio DO  01/23/19 1127

## 2019-01-24 ENCOUNTER — VBI (OUTPATIENT)
Dept: ADMINISTRATIVE | Facility: OTHER | Age: 64
End: 2019-01-24

## 2019-01-24 NOTE — TELEPHONE ENCOUNTER
Waylondom Anderson    ED Visit Information     Ed visit date: 1/23/2019  Diagnosis Description: Conjunctivitis  In Network? Yes [facility]  Discharge status: Home  Discharged with meds ? No  Number of ED visits to date: 1  ED Severity:n/a     Outreach Information    Outreach successful: No 2  Date letter mailed:1/25/2019  Date Finalized:1/25/2019    Care Coordination    Follow up appointment with pcp: no None scheduled  Transportation issues ? NA    Value Base Outreach    Outreach type:  7 Day Outreach  Emergent necessity warranted by diagnosis:  No  [facility] PCP:  Yes  01/24/2019 11:23 AM Phone ([employer]) Elysia Sidhu (Self) [phone] [address]) Remove  Not Available - received automated message stating wireless customer you are attempting to reach is unavailable  No option to leave message  01/24/2019 11:24 AM Phone ([employer]) Elysia Sidhu (Self) [phone] (H)   Left Message - requesting a call back    Unable to leave a message for patient regarding recent ED visit on 1/23/2019 for Conjunctivitis  2nd attempt will be made on 1/25 01/25/2019 03:22 PM Phone ([employer]) Elysia Sidhu (Self) [phone] (H)   Left Message - requesting a call back    01/25/2019 03:23 PM Phone ([employer]) Elysia Sidhu (Self) [phone] [address])   Not Available - received automated message stating wireless customer you are attempting to reach is unavailable  No option to leave message    Unable to leave a message for patient regarding recent ED visit on 1/23/2019 for Conjunctivitis  No further telephonic attempts will be made at this time  Letter generated and mailed

## 2019-02-04 DIAGNOSIS — I10 ESSENTIAL HYPERTENSION: ICD-10-CM

## 2019-04-14 ENCOUNTER — HOSPITAL ENCOUNTER (EMERGENCY)
Facility: HOSPITAL | Age: 64
Discharge: HOME | End: 2019-04-14
Attending: EMERGENCY MEDICINE | Admitting: EMERGENCY MEDICINE
Payer: COMMERCIAL

## 2019-04-14 ENCOUNTER — APPOINTMENT (EMERGENCY)
Dept: RADIOLOGY | Facility: HOSPITAL | Age: 64
End: 2019-04-14
Payer: COMMERCIAL

## 2019-04-14 VITALS
RESPIRATION RATE: 17 BRPM | BODY MASS INDEX: 34.26 KG/M2 | OXYGEN SATURATION: 96 % | HEART RATE: 70 BPM | WEIGHT: 244.71 LBS | TEMPERATURE: 97.7 F | SYSTOLIC BLOOD PRESSURE: 170 MMHG | DIASTOLIC BLOOD PRESSURE: 85 MMHG | HEIGHT: 71 IN

## 2019-04-14 DIAGNOSIS — J44.1 COPD WITH ACUTE EXACERBATION (HCC): Primary | ICD-10-CM

## 2019-04-14 LAB
ALBUMIN SERPL BCP-MCNC: 3.5 G/DL (ref 3.5–5)
ALP SERPL-CCNC: 76 U/L (ref 46–116)
ALT SERPL W P-5'-P-CCNC: 35 U/L (ref 12–78)
ANION GAP SERPL CALCULATED.3IONS-SCNC: 7 MMOL/L (ref 4–13)
AST SERPL W P-5'-P-CCNC: 16 U/L (ref 5–45)
BASOPHILS # BLD AUTO: 0.05 THOUSANDS/ΜL (ref 0–0.1)
BASOPHILS NFR BLD AUTO: 1 % (ref 0–1)
BILIRUB SERPL-MCNC: 0.2 MG/DL (ref 0.2–1)
BUN SERPL-MCNC: 23 MG/DL (ref 5–25)
CALCIUM SERPL-MCNC: 8.7 MG/DL (ref 8.3–10.1)
CHLORIDE SERPL-SCNC: 107 MMOL/L (ref 100–108)
CO2 SERPL-SCNC: 29 MMOL/L (ref 21–32)
CREAT SERPL-MCNC: 1.53 MG/DL (ref 0.6–1.3)
EOSINOPHIL # BLD AUTO: 0.27 THOUSAND/ΜL (ref 0–0.61)
EOSINOPHIL NFR BLD AUTO: 3 % (ref 0–6)
ERYTHROCYTE [DISTWIDTH] IN BLOOD BY AUTOMATED COUNT: 13 % (ref 11.6–15.1)
GFR SERPL CREATININE-BSD FRML MDRD: 47 ML/MIN/1.73SQ M
GLUCOSE SERPL-MCNC: 122 MG/DL (ref 65–140)
HCT VFR BLD AUTO: 44 % (ref 36.5–49.3)
HGB BLD-MCNC: 14.2 G/DL (ref 12–17)
IMM GRANULOCYTES # BLD AUTO: 0.11 THOUSAND/UL (ref 0–0.2)
IMM GRANULOCYTES NFR BLD AUTO: 1 % (ref 0–2)
LYMPHOCYTES # BLD AUTO: 1.79 THOUSANDS/ΜL (ref 0.6–4.47)
LYMPHOCYTES NFR BLD AUTO: 21 % (ref 14–44)
MCH RBC QN AUTO: 29.5 PG (ref 26.8–34.3)
MCHC RBC AUTO-ENTMCNC: 32.3 G/DL (ref 31.4–37.4)
MCV RBC AUTO: 92 FL (ref 82–98)
MONOCYTES # BLD AUTO: 0.97 THOUSAND/ΜL (ref 0.17–1.22)
MONOCYTES NFR BLD AUTO: 11 % (ref 4–12)
NEUTROPHILS # BLD AUTO: 5.5 THOUSANDS/ΜL (ref 1.85–7.62)
NEUTS SEG NFR BLD AUTO: 63 % (ref 43–75)
NRBC BLD AUTO-RTO: 0 /100 WBCS
PLATELET # BLD AUTO: 245 THOUSANDS/UL (ref 149–390)
PMV BLD AUTO: 11.6 FL (ref 8.9–12.7)
POTASSIUM SERPL-SCNC: 3.7 MMOL/L (ref 3.5–5.3)
PROT SERPL-MCNC: 6.8 G/DL (ref 6.4–8.2)
RBC # BLD AUTO: 4.81 MILLION/UL (ref 3.88–5.62)
SODIUM SERPL-SCNC: 143 MMOL/L (ref 136–145)
TROPONIN I SERPL-MCNC: <0.02 NG/ML
WBC # BLD AUTO: 8.69 THOUSAND/UL (ref 4.31–10.16)

## 2019-04-14 PROCEDURE — 94640 AIRWAY INHALATION TREATMENT: CPT

## 2019-04-14 PROCEDURE — 85025 COMPLETE CBC W/AUTO DIFF WBC: CPT | Performed by: PHYSICIAN ASSISTANT

## 2019-04-14 PROCEDURE — 99285 EMERGENCY DEPT VISIT HI MDM: CPT

## 2019-04-14 PROCEDURE — 80053 COMPREHEN METABOLIC PANEL: CPT | Performed by: PHYSICIAN ASSISTANT

## 2019-04-14 PROCEDURE — 84484 ASSAY OF TROPONIN QUANT: CPT | Performed by: PHYSICIAN ASSISTANT

## 2019-04-14 PROCEDURE — 71045 X-RAY EXAM CHEST 1 VIEW: CPT

## 2019-04-14 PROCEDURE — 96374 THER/PROPH/DIAG INJ IV PUSH: CPT

## 2019-04-14 PROCEDURE — 99283 EMERGENCY DEPT VISIT LOW MDM: CPT | Performed by: PHYSICIAN ASSISTANT

## 2019-04-14 PROCEDURE — 36415 COLL VENOUS BLD VENIPUNCTURE: CPT | Performed by: PHYSICIAN ASSISTANT

## 2019-04-14 PROCEDURE — 93005 ELECTROCARDIOGRAM TRACING: CPT

## 2019-04-14 RX ORDER — IPRATROPIUM BROMIDE AND ALBUTEROL SULFATE 2.5; .5 MG/3ML; MG/3ML
3 SOLUTION RESPIRATORY (INHALATION) ONCE
Status: COMPLETED | OUTPATIENT
Start: 2019-04-14 | End: 2019-04-14

## 2019-04-14 RX ORDER — ALBUTEROL SULFATE 2.5 MG/3ML
2.5 SOLUTION RESPIRATORY (INHALATION) EVERY 6 HOURS PRN
Qty: 30 VIAL | Refills: 0 | Status: SHIPPED | OUTPATIENT
Start: 2019-04-14 | End: 2019-12-11 | Stop reason: SDUPTHER

## 2019-04-14 RX ORDER — METHYLPREDNISOLONE 4 MG/1
TABLET ORAL
Qty: 21 TABLET | Refills: 0 | Status: SHIPPED | OUTPATIENT
Start: 2019-04-14 | End: 2019-12-11 | Stop reason: ALTCHOICE

## 2019-04-14 RX ORDER — METHYLPREDNISOLONE SODIUM SUCCINATE 40 MG/ML
40 INJECTION, POWDER, LYOPHILIZED, FOR SOLUTION INTRAMUSCULAR; INTRAVENOUS ONCE
Status: COMPLETED | OUTPATIENT
Start: 2019-04-14 | End: 2019-04-14

## 2019-04-14 RX ADMIN — IPRATROPIUM BROMIDE AND ALBUTEROL SULFATE 3 ML: 2.5; .5 SOLUTION RESPIRATORY (INHALATION) at 20:41

## 2019-04-14 RX ADMIN — METHYLPREDNISOLONE SODIUM SUCCINATE 40 MG: 40 INJECTION, POWDER, FOR SOLUTION INTRAMUSCULAR; INTRAVENOUS at 21:34

## 2019-04-15 LAB
ATRIAL RATE: 82 BPM
P AXIS: 67 DEGREES
PR INTERVAL: 156 MS
QRS AXIS: 268 DEGREES
QRSD INTERVAL: 104 MS
QT INTERVAL: 382 MS
QTC INTERVAL: 446 MS
T WAVE AXIS: 46 DEGREES
VENTRICULAR RATE: 82 BPM

## 2019-04-15 PROCEDURE — 93010 ELECTROCARDIOGRAM REPORT: CPT | Performed by: INTERNAL MEDICINE

## 2019-04-18 ENCOUNTER — VBI (OUTPATIENT)
Dept: ADMINISTRATIVE | Facility: OTHER | Age: 64
End: 2019-04-18

## 2019-06-07 ENCOUNTER — TELEPHONE (OUTPATIENT)
Dept: FAMILY MEDICINE CLINIC | Facility: HOSPITAL | Age: 64
End: 2019-06-07

## 2019-07-19 DIAGNOSIS — E03.8 OTHER SPECIFIED HYPOTHYROIDISM: ICD-10-CM

## 2019-07-19 RX ORDER — LEVOTHYROXINE SODIUM 0.1 MG/1
TABLET ORAL
Qty: 30 TABLET | Refills: 5 | Status: SHIPPED | OUTPATIENT
Start: 2019-07-19 | End: 2020-07-13

## 2019-10-07 ENCOUNTER — TELEPHONE (OUTPATIENT)
Dept: FAMILY MEDICINE CLINIC | Facility: HOSPITAL | Age: 64
End: 2019-10-07

## 2019-10-07 NOTE — TELEPHONE ENCOUNTER
Pt called  He wants to visit his son in [place] on 11/24-12/4/19  He does not want to travel alone  He needs to take his cpap machine with him  He wanted his other son who lives locally to travel with him because he does not like to travel alone however his son works for [employer] and cannot get off of work during that time except in family emergencies  The patient was wondering if you could write a letter stating it would be beneficial to not have the patient travel alone and to have his son accompany him

## 2019-11-16 DIAGNOSIS — I10 ESSENTIAL HYPERTENSION: ICD-10-CM

## 2019-11-16 RX ORDER — LISINOPRIL AND HYDROCHLOROTHIAZIDE 12.5; 1 MG/1; MG/1
TABLET ORAL
Qty: 30 TABLET | Refills: 5 | Status: SHIPPED | OUTPATIENT
Start: 2019-11-16 | End: 2020-07-13

## 2019-11-22 ENCOUNTER — TELEPHONE (OUTPATIENT)
Dept: FAMILY MEDICINE CLINIC | Facility: HOSPITAL | Age: 64
End: 2019-11-22

## 2019-11-22 DIAGNOSIS — R06.00 DYSPNEA, UNSPECIFIED TYPE: Primary | ICD-10-CM

## 2019-11-22 RX ORDER — ALBUTEROL SULFATE 90 UG/1
2 AEROSOL, METERED RESPIRATORY (INHALATION) EVERY 6 HOURS PRN
Qty: 1 INHALER | Refills: 2 | Status: SHIPPED | OUTPATIENT
Start: 2019-11-22 | End: 2020-04-15 | Stop reason: SDUPTHER

## 2019-11-22 NOTE — TELEPHONE ENCOUNTER
I sent it to [facility], I have not seen pt since 04/2018, I need to see him to be able to provide good medical care to him!

## 2019-12-05 ENCOUNTER — TRANSCRIBE ORDERS (OUTPATIENT)
Dept: ADMINISTRATIVE | Facility: HOSPITAL | Age: 64
End: 2019-12-05

## 2019-12-05 ENCOUNTER — HOSPITAL ENCOUNTER (OUTPATIENT)
Dept: RADIOLOGY | Facility: HOSPITAL | Age: 64
Discharge: HOME | End: 2019-12-05
Payer: COMMERCIAL

## 2019-12-05 ENCOUNTER — TELEPHONE (OUTPATIENT)
Dept: FAMILY MEDICINE CLINIC | Facility: HOSPITAL | Age: 64
End: 2019-12-05

## 2019-12-05 DIAGNOSIS — N28.89 URETERAL FISTULA: Primary | ICD-10-CM

## 2019-12-05 DIAGNOSIS — N28.89 URETERAL FISTULA: ICD-10-CM

## 2019-12-05 PROCEDURE — 71046 X-RAY EXAM CHEST 2 VIEWS: CPT

## 2019-12-06 ENCOUNTER — HOSPITAL ENCOUNTER (OUTPATIENT)
Dept: CT IMAGING | Facility: HOSPITAL | Age: 64
Discharge: HOME | End: 2019-12-06
Payer: COMMERCIAL

## 2019-12-06 DIAGNOSIS — N28.89 RENAL MASS: ICD-10-CM

## 2019-12-06 PROCEDURE — 74176 CT ABD & PELVIS W/O CONTRAST: CPT

## 2019-12-11 ENCOUNTER — OFFICE VISIT (OUTPATIENT)
Dept: FAMILY MEDICINE CLINIC | Facility: HOSPITAL | Age: 64
End: 2019-12-11
Payer: COMMERCIAL

## 2019-12-11 VITALS
SYSTOLIC BLOOD PRESSURE: 134 MMHG | BODY MASS INDEX: 37.66 KG/M2 | HEIGHT: 71 IN | DIASTOLIC BLOOD PRESSURE: 78 MMHG | RESPIRATION RATE: 14 BRPM | WEIGHT: 269 LBS | HEART RATE: 72 BPM

## 2019-12-11 DIAGNOSIS — G47.33 MILD OBSTRUCTIVE SLEEP APNEA: ICD-10-CM

## 2019-12-11 DIAGNOSIS — E03.8 OTHER SPECIFIED HYPOTHYROIDISM: ICD-10-CM

## 2019-12-11 DIAGNOSIS — I10 ESSENTIAL HYPERTENSION: ICD-10-CM

## 2019-12-11 DIAGNOSIS — R06.2 WHEEZING: Primary | ICD-10-CM

## 2019-12-11 DIAGNOSIS — E66.01 SEVERE OBESITY (BMI 35.0-39.9) WITH COMORBIDITY (HCC): ICD-10-CM

## 2019-12-11 PROCEDURE — 3078F DIAST BP <80 MM HG: CPT | Performed by: INTERNAL MEDICINE

## 2019-12-11 PROCEDURE — 99214 OFFICE O/P EST MOD 30 MIN: CPT | Performed by: INTERNAL MEDICINE

## 2019-12-11 PROCEDURE — 1036F TOBACCO NON-USER: CPT | Performed by: INTERNAL MEDICINE

## 2019-12-11 PROCEDURE — 3008F BODY MASS INDEX DOCD: CPT | Performed by: INTERNAL MEDICINE

## 2019-12-11 PROCEDURE — 1101F PT FALLS ASSESS-DOCD LE1/YR: CPT | Performed by: INTERNAL MEDICINE

## 2019-12-11 PROCEDURE — 3075F SYST BP GE 130 - 139MM HG: CPT | Performed by: INTERNAL MEDICINE

## 2019-12-11 RX ORDER — ALBUTEROL SULFATE 2.5 MG/3ML
2.5 SOLUTION RESPIRATORY (INHALATION) EVERY 6 HOURS PRN
Qty: 30 VIAL | Refills: 5 | Status: SHIPPED | OUTPATIENT
Start: 2019-12-11 | End: 2020-04-08 | Stop reason: ALTCHOICE

## 2019-12-11 NOTE — PATIENT INSTRUCTIONS

## 2019-12-15 LAB — HEMOCCULT STL QL IA: POSITIVE

## 2019-12-16 NOTE — RESULT ENCOUNTER NOTE
Call patient: Brooklynn Harris that he has microscopic blood in the stool, I need to very strongly advise patient to have a colonoscopy to see if he has polyps, I refer my patients to [facility]

## 2019-12-26 ENCOUNTER — TELEPHONE (OUTPATIENT)
Dept: FAMILY MEDICINE CLINIC | Facility: HOSPITAL | Age: 64
End: 2019-12-26

## 2019-12-31 ENCOUNTER — TELEPHONE (OUTPATIENT)
Dept: FAMILY MEDICINE CLINIC | Facility: HOSPITAL | Age: 64
End: 2019-12-31

## 2019-12-31 NOTE — TELEPHONE ENCOUNTER
LM for pt to call back for instructions DD    ----- Message from Rafiq Calix MD sent at 12/16/2019  1:38 PM EST -----  Call patient: Yenny Myers that he has microscopic blood in the stool, I need to very strongly advise patient to have a colonoscopy to see if he has polyps, I refer my patients to [facility]

## 2020-02-06 ENCOUNTER — HOSPITAL ENCOUNTER (INPATIENT)
Facility: HOSPITAL | Age: 65
LOS: 3 days | Discharge: HOME | DRG: 189 | End: 2020-02-09
Attending: EMERGENCY MEDICINE | Admitting: FAMILY MEDICINE
Payer: MEDICARE

## 2020-02-06 ENCOUNTER — APPOINTMENT (EMERGENCY)
Dept: RADIOLOGY | Facility: HOSPITAL | Age: 65
DRG: 189 | End: 2020-02-06
Payer: MEDICARE

## 2020-02-06 DIAGNOSIS — J20.9 ACUTE BRONCHITIS WITH BRONCHOSPASM: ICD-10-CM

## 2020-02-06 DIAGNOSIS — J10.1 INFLUENZA A: ICD-10-CM

## 2020-02-06 DIAGNOSIS — R06.00 DYSPNEA: Primary | ICD-10-CM

## 2020-02-06 DIAGNOSIS — R06.2 WHEEZING: ICD-10-CM

## 2020-02-06 DIAGNOSIS — I10 HYPERTENSION: ICD-10-CM

## 2020-02-06 PROBLEM — N18.30 STAGE 3 CHRONIC KIDNEY DISEASE (HCC): Status: ACTIVE | Noted: 2020-02-06

## 2020-02-06 PROBLEM — J44.1 CHRONIC OBSTRUCTIVE PULMONARY DISEASE WITH ACUTE EXACERBATION (HCC): Status: ACTIVE | Noted: 2020-02-06

## 2020-02-06 PROBLEM — J44.9 COPD (CHRONIC OBSTRUCTIVE PULMONARY DISEASE) (HCC): Status: ACTIVE | Noted: 2020-02-06

## 2020-02-06 LAB
ANION GAP SERPL CALCULATED.3IONS-SCNC: 9 MMOL/L (ref 4–13)
ATRIAL RATE: 85 BPM
BASOPHILS # BLD AUTO: 0.04 THOUSANDS/ΜL (ref 0–0.1)
BASOPHILS NFR BLD AUTO: 0 % (ref 0–1)
BUN SERPL-MCNC: 18 MG/DL (ref 5–25)
CALCIUM SERPL-MCNC: 8.6 MG/DL (ref 8.3–10.1)
CHLORIDE SERPL-SCNC: 103 MMOL/L (ref 100–108)
CO2 SERPL-SCNC: 28 MMOL/L (ref 21–32)
CREAT SERPL-MCNC: 1.72 MG/DL (ref 0.6–1.3)
EOSINOPHIL # BLD AUTO: 0.01 THOUSAND/ΜL (ref 0–0.61)
EOSINOPHIL NFR BLD AUTO: 0 % (ref 0–6)
ERYTHROCYTE [DISTWIDTH] IN BLOOD BY AUTOMATED COUNT: 13.3 % (ref 11.6–15.1)
GFR SERPL CREATININE-BSD FRML MDRD: 41 ML/MIN/1.73SQ M
GLUCOSE SERPL-MCNC: 116 MG/DL (ref 65–140)
HCT VFR BLD AUTO: 44.2 % (ref 36.5–49.3)
HGB BLD-MCNC: 14.1 G/DL (ref 12–17)
IMM GRANULOCYTES # BLD AUTO: 0.09 THOUSAND/UL (ref 0–0.2)
IMM GRANULOCYTES NFR BLD AUTO: 1 % (ref 0–2)
LYMPHOCYTES # BLD AUTO: 0.35 THOUSANDS/ΜL (ref 0.6–4.47)
LYMPHOCYTES NFR BLD AUTO: 4 % (ref 14–44)
MCH RBC QN AUTO: 28.9 PG (ref 26.8–34.3)
MCHC RBC AUTO-ENTMCNC: 31.9 G/DL (ref 31.4–37.4)
MCV RBC AUTO: 91 FL (ref 82–98)
MONOCYTES # BLD AUTO: 1.17 THOUSAND/ΜL (ref 0.17–1.22)
MONOCYTES NFR BLD AUTO: 13 % (ref 4–12)
NEUTROPHILS # BLD AUTO: 7.71 THOUSANDS/ΜL (ref 1.85–7.62)
NEUTS SEG NFR BLD AUTO: 82 % (ref 43–75)
NRBC BLD AUTO-RTO: 0 /100 WBCS
NT-PROBNP SERPL-MCNC: 326 PG/ML
P AXIS: 79 DEGREES
PLATELET # BLD AUTO: 181 THOUSANDS/UL (ref 149–390)
PMV BLD AUTO: 11.5 FL (ref 8.9–12.7)
POTASSIUM SERPL-SCNC: 4 MMOL/L (ref 3.5–5.3)
PR INTERVAL: 170 MS
QRS AXIS: -79 DEGREES
QRSD INTERVAL: 102 MS
QT INTERVAL: 362 MS
QTC INTERVAL: 430 MS
RBC # BLD AUTO: 4.88 MILLION/UL (ref 3.88–5.62)
SODIUM SERPL-SCNC: 140 MMOL/L (ref 136–145)
T WAVE AXIS: 41 DEGREES
TROPONIN I SERPL-MCNC: <0.02 NG/ML
VENTRICULAR RATE: 85 BPM
WBC # BLD AUTO: 9.37 THOUSAND/UL (ref 4.31–10.16)

## 2020-02-06 PROCEDURE — 71045 X-RAY EXAM CHEST 1 VIEW: CPT

## 2020-02-06 PROCEDURE — 99285 EMERGENCY DEPT VISIT HI MDM: CPT | Performed by: EMERGENCY MEDICINE

## 2020-02-06 PROCEDURE — 94640 AIRWAY INHALATION TREATMENT: CPT

## 2020-02-06 PROCEDURE — 99223 1ST HOSP IP/OBS HIGH 75: CPT | Performed by: PHYSICIAN ASSISTANT

## 2020-02-06 PROCEDURE — 80048 BASIC METABOLIC PNL TOTAL CA: CPT | Performed by: EMERGENCY MEDICINE

## 2020-02-06 PROCEDURE — 85025 COMPLETE CBC W/AUTO DIFF WBC: CPT | Performed by: EMERGENCY MEDICINE

## 2020-02-06 PROCEDURE — 36415 COLL VENOUS BLD VENIPUNCTURE: CPT | Performed by: EMERGENCY MEDICINE

## 2020-02-06 PROCEDURE — 96374 THER/PROPH/DIAG INJ IV PUSH: CPT

## 2020-02-06 PROCEDURE — 83880 ASSAY OF NATRIURETIC PEPTIDE: CPT | Performed by: EMERGENCY MEDICINE

## 2020-02-06 PROCEDURE — 99285 EMERGENCY DEPT VISIT HI MDM: CPT

## 2020-02-06 PROCEDURE — 93010 ELECTROCARDIOGRAM REPORT: CPT | Performed by: INTERNAL MEDICINE

## 2020-02-06 PROCEDURE — 93005 ELECTROCARDIOGRAM TRACING: CPT

## 2020-02-06 PROCEDURE — 84484 ASSAY OF TROPONIN QUANT: CPT | Performed by: EMERGENCY MEDICINE

## 2020-02-06 PROCEDURE — 94760 N-INVAS EAR/PLS OXIMETRY 1: CPT

## 2020-02-06 PROCEDURE — 94664 DEMO&/EVAL PT USE INHALER: CPT

## 2020-02-06 RX ORDER — IPRATROPIUM BROMIDE AND ALBUTEROL SULFATE 2.5; .5 MG/3ML; MG/3ML
3 SOLUTION RESPIRATORY (INHALATION) ONCE
Status: COMPLETED | OUTPATIENT
Start: 2020-02-06 | End: 2020-02-06

## 2020-02-06 RX ORDER — IPRATROPIUM BROMIDE AND ALBUTEROL SULFATE 2.5; .5 MG/3ML; MG/3ML
3 SOLUTION RESPIRATORY (INHALATION)
Status: DISCONTINUED | OUTPATIENT
Start: 2020-02-06 | End: 2020-02-06

## 2020-02-06 RX ORDER — GUAIFENESIN 600 MG
600 TABLET, EXTENDED RELEASE 12 HR ORAL EVERY 12 HOURS SCHEDULED
Status: DISCONTINUED | OUTPATIENT
Start: 2020-02-06 | End: 2020-02-07

## 2020-02-06 RX ORDER — LISINOPRIL 10 MG/1
10 TABLET ORAL DAILY
Status: DISCONTINUED | OUTPATIENT
Start: 2020-02-07 | End: 2020-02-09 | Stop reason: HOSPADM

## 2020-02-06 RX ORDER — METHYLPREDNISOLONE SODIUM SUCCINATE 40 MG/ML
40 INJECTION, POWDER, LYOPHILIZED, FOR SOLUTION INTRAMUSCULAR; INTRAVENOUS EVERY 12 HOURS SCHEDULED
Status: DISCONTINUED | OUTPATIENT
Start: 2020-02-06 | End: 2020-02-07

## 2020-02-06 RX ORDER — METHYLPREDNISOLONE SODIUM SUCCINATE 125 MG/2ML
125 INJECTION, POWDER, LYOPHILIZED, FOR SOLUTION INTRAMUSCULAR; INTRAVENOUS ONCE
Status: COMPLETED | OUTPATIENT
Start: 2020-02-06 | End: 2020-02-06

## 2020-02-06 RX ORDER — HYDROCHLOROTHIAZIDE 12.5 MG/1
12.5 TABLET ORAL DAILY
Status: DISCONTINUED | OUTPATIENT
Start: 2020-02-07 | End: 2020-02-09 | Stop reason: HOSPADM

## 2020-02-06 RX ORDER — FLUTICASONE FUROATE AND VILANTEROL 100; 25 UG/1; UG/1
1 POWDER RESPIRATORY (INHALATION) DAILY
Status: DISCONTINUED | OUTPATIENT
Start: 2020-02-07 | End: 2020-02-09 | Stop reason: HOSPADM

## 2020-02-06 RX ORDER — LEVOTHYROXINE SODIUM 0.1 MG/1
100 TABLET ORAL
Status: DISCONTINUED | OUTPATIENT
Start: 2020-02-07 | End: 2020-02-09 | Stop reason: HOSPADM

## 2020-02-06 RX ORDER — IPRATROPIUM BROMIDE AND ALBUTEROL SULFATE 2.5; .5 MG/3ML; MG/3ML
3 SOLUTION RESPIRATORY (INHALATION)
Status: DISCONTINUED | OUTPATIENT
Start: 2020-02-06 | End: 2020-02-09 | Stop reason: HOSPADM

## 2020-02-06 RX ORDER — ACETAMINOPHEN 325 MG/1
650 TABLET ORAL EVERY 6 HOURS PRN
Status: DISCONTINUED | OUTPATIENT
Start: 2020-02-06 | End: 2020-02-09 | Stop reason: HOSPADM

## 2020-02-06 RX ORDER — BENZONATATE 100 MG/1
200 CAPSULE ORAL 3 TIMES DAILY
Status: DISCONTINUED | OUTPATIENT
Start: 2020-02-06 | End: 2020-02-09 | Stop reason: HOSPADM

## 2020-02-06 RX ORDER — ALBUTEROL SULFATE 2.5 MG/3ML
2.5 SOLUTION RESPIRATORY (INHALATION) EVERY 4 HOURS PRN
Status: DISCONTINUED | OUTPATIENT
Start: 2020-02-06 | End: 2020-02-09 | Stop reason: HOSPADM

## 2020-02-06 RX ADMIN — METOPROLOL TARTRATE 25 MG: 25 TABLET ORAL at 18:56

## 2020-02-06 RX ADMIN — METHYLPREDNISOLONE SODIUM SUCCINATE 125 MG: 125 INJECTION, POWDER, FOR SOLUTION INTRAMUSCULAR; INTRAVENOUS at 14:02

## 2020-02-06 RX ADMIN — BENZONATATE 200 MG: 100 CAPSULE ORAL at 21:37

## 2020-02-06 RX ADMIN — ALBUTEROL SULFATE 2.5 MG: 2.5 SOLUTION RESPIRATORY (INHALATION) at 16:52

## 2020-02-06 RX ADMIN — IPRATROPIUM BROMIDE AND ALBUTEROL SULFATE 3 ML: 2.5; .5 SOLUTION RESPIRATORY (INHALATION) at 20:29

## 2020-02-06 RX ADMIN — IPRATROPIUM BROMIDE AND ALBUTEROL SULFATE 3 ML: 2.5; .5 SOLUTION RESPIRATORY (INHALATION) at 13:53

## 2020-02-06 RX ADMIN — AZITHROMYCIN MONOHYDRATE 500 MG: 500 INJECTION, POWDER, LYOPHILIZED, FOR SOLUTION INTRAVENOUS at 14:47

## 2020-02-06 RX ADMIN — METHYLPREDNISOLONE SODIUM SUCCINATE 40 MG: 40 INJECTION, POWDER, FOR SOLUTION INTRAMUSCULAR; INTRAVENOUS at 21:38

## 2020-02-06 RX ADMIN — IPRATROPIUM BROMIDE AND ALBUTEROL SULFATE 3 ML: 2.5; .5 SOLUTION RESPIRATORY (INHALATION) at 11:50

## 2020-02-06 RX ADMIN — ALBUTEROL SULFATE 5 MG: 2.5 SOLUTION RESPIRATORY (INHALATION) at 14:44

## 2020-02-06 RX ADMIN — GUAIFENESIN 600 MG: 600 TABLET ORAL at 21:37

## 2020-02-06 NOTE — ED PROVIDER NOTES
History  Chief Complaint   Patient presents with    Shortness of Breath     c/o SOB and cough starting yesterday  Pt denies chest pain, but does c/o pain when coughing  Pt poor historian  58-year-old male presents for evaluation of shortness of breath with associated nonproductive cough  Patient states that his symptoms started yesterday and have gotten progressively worse despite him using a nebulizer at home  The patient denies any specific chest pain although he has tightness with breathing  He denies any leg pain cramping  He does have some swelling  The patient denies fevers or chills  Symptoms are worse with exertion          Prior to Admission Medications   Prescriptions Last Dose Informant Patient Reported? Taking?    albuterol (2 5 mg/3 mL) 0 083 % nebulizer solution 2/6/2020 at Unknown time  No Yes   Sig: Take 1 vial (2 5 mg total) by nebulization every 6 (six) hours as needed for wheezing or shortness of breath   levothyroxine 100 mcg tablet 2/6/2020 at Unknown time  No Yes   Sig: take 1 tablet by mouth daily   lisinopril-hydrochlorothiazide (PRINZIDE,ZESTORETIC) 10-12 5 MG per tablet 2/6/2020 at Unknown time  No Yes   Sig: take 1 tablet by mouth daily   metoprolol tartrate (LOPRESSOR) 25 mg tablet 2/6/2020 at Unknown time  No Yes   Sig: take 1 tablet by mouth twice a day      Facility-Administered Medications: None       Past Medical History:   Diagnosis Date    Cellulitis, trunk     Disease of thyroid gland     Gastroenteritis and colitis, viral     last assessed: 11/24/2015    Hernia     Hypertension     Lung mass     Renal mass        Past Surgical History:   Procedure Laterality Date    ABDOMINAL ADHESION SURGERY N/A 1/13/2016    Procedure: LYSIS ADHESIONS;  Surgeon: Zari Osorio MD;  [facility];  Service:     INGUINAL HERNIA REPAIR      LYMPH NODE DISSECTION N/A 1/13/2016    Procedure: DISSECTION/STAGING LYMPH NODE PELVIS/ABDOMEN;  Surgeon: Zari Osorio MD; [facility];  Service:     NEPHRECTOMY Right     patients believes it was the Right kidney    MA LAP,PARTIAL NEPHRECTOMY Right 1/13/2016    Procedure: NEPHRECTOMY PARTIAL LAPAROSCOPIC W ROBOTICS, intra op ultrasound,laparoscopic lysis of adhesions;  Surgeon: Boogie Ahn MD;  [facility];  Service: Urology       Family History   Problem Relation Age of Onset    Lung cancer Father     Melanoma Father     Other Father         renal insufficiency    Dementia Mother     Mental illness Neg Hx     Substance Abuse Neg Hx      I have reviewed and agree with the history as documented  Social History     Tobacco Use    Smoking status: Former Smoker    Smokeless tobacco: Former User    Tobacco comment: not in last 12 months  quit smoking arnd 6/2013, used to smoke about 1 pack per week   Substance Use Topics    Alcohol use: Yes     Frequency: Monthly or less     Comment: social  ocasional    Drug use: No        Review of Systems   Constitutional: Negative for chills, fatigue and fever  HENT: Negative for sore throat  Respiratory: Positive for cough, shortness of breath and wheezing  Negative for chest tightness  Cardiovascular: Positive for leg swelling  Negative for chest pain and palpitations  Gastrointestinal: Negative for abdominal pain, constipation, diarrhea, nausea and vomiting  Genitourinary: Negative for difficulty urinating and dysuria  Musculoskeletal: Negative for back pain  Skin: Negative for rash  Neurological: Negative for dizziness, seizures, syncope, weakness and headaches  All other systems reviewed and are negative  Physical Exam  Physical Exam   Constitutional: He is oriented to person, place, and time  He appears well-developed and well-nourished  No distress  HENT:   Head: Normocephalic and atraumatic  Right Ear: External ear normal    Left Ear: External ear normal    Mouth/Throat: No oropharyngeal exudate     Eyes: Pupils are equal, round, and reactive to light  EOM are normal  No scleral icterus  Neck: Normal range of motion  Neck supple  Cardiovascular: Normal rate, regular rhythm and normal heart sounds  Pulmonary/Chest: Effort normal  No accessory muscle usage  Tachypnea noted  No respiratory distress  He has decreased breath sounds ( Diffuse)  He has wheezes ( Diffuse)  Abdominal: Soft  Bowel sounds are normal  There is no tenderness  There is no rebound and no guarding  Musculoskeletal: Normal range of motion  He exhibits edema ( Pitting bilateral)  Neurological: He is alert and oriented to person, place, and time  Skin: Skin is warm and dry  No rash noted  Psychiatric: He has a normal mood and affect  Nursing note and vitals reviewed        Vital Signs  ED Triage Vitals [02/06/20 1139]   Temperature Pulse Respirations Blood Pressure SpO2   98 4 °F (36 9 °C) 81 (!) 24 125/82 93 %      Temp Source Heart Rate Source Patient Position - Orthostatic VS BP Location FiO2 (%)   Temporal Monitor Lying Right arm --      Pain Score       No Pain           Vitals:    02/06/20 1355 02/06/20 1500 02/06/20 1545 02/06/20 1700   BP: 143/80  143/81 141/77   Pulse: 68 88 87 85   Patient Position - Orthostatic VS:             Visual Acuity      ED Medications  Medications   metoprolol tartrate (LOPRESSOR) tablet 25 mg (25 mg Oral Given 2/6/20 1856)   levothyroxine tablet 100 mcg (has no administration in time range)   lisinopril (ZESTRIL) tablet 10 mg (has no administration in time range)   hydrochlorothiazide (HYDRODIURIL) tablet 12 5 mg (has no administration in time range)   enoxaparin (LOVENOX) subcutaneous injection 40 mg (has no administration in time range)   acetaminophen (TYLENOL) tablet 650 mg (has no administration in time range)   methylPREDNISolone sodium succinate (Solu-MEDROL) injection 40 mg (has no administration in time range)   fluticasone-vilanterol (BREO ELLIPTA) 100-25 mcg/inh inhaler 1 puff (has no administration in time range)   ipratropium-albuterol (DUO-NEB) 0 5-2 5 mg/3 mL inhalation solution 3 mL (has no administration in time range)   albuterol inhalation solution 2 5 mg (2 5 mg Nebulization Given 2/6/20 1652)   benzonatate (TESSALON PERLES) capsule 200 mg (has no administration in time range)   guaiFENesin (MUCINEX) 12 hr tablet 600 mg (has no administration in time range)   ipratropium-albuterol (DUO-NEB) 0 5-2 5 mg/3 mL inhalation solution 3 mL (3 mL Nebulization Given 2/6/20 1150)   ipratropium-albuterol (DUO-NEB) 0 5-2 5 mg/3 mL inhalation solution 3 mL (3 mL Nebulization Given 2/6/20 1353)   methylPREDNISolone sodium succinate (Solu-MEDROL) injection 125 mg (125 mg Intravenous Given 2/6/20 1402)   azithromycin (ZITHROMAX) 500 mg in sodium chloride 0 9% 250mL IVPB 500 mg (500 mg Intravenous New Bag 2/6/20 1447)   albuterol inhalation solution 5 mg (5 mg Nebulization Given 2/6/20 1444)       Diagnostic Studies  Results Reviewed     Procedure Component Value Units Date/Time    NT-BNP PRO [[id number]]  (Abnormal) Collected:  02/06/20 1149    Lab Status:  Final result Specimen:  Blood from Arm, Right Updated:  02/06/20 1254     NT-proBNP 326 pg/mL     Basic metabolic panel [[id number]]  (Abnormal) Collected:  02/06/20 1149    Lab Status:  Final result Specimen:  Blood from Arm, Right Updated:  02/06/20 1240     Sodium 140 mmol/L      Potassium 4 0 mmol/L      Chloride 103 mmol/L      CO2 28 mmol/L      ANION GAP 9 mmol/L      BUN 18 mg/dL      Creatinine 1 72 mg/dL      Glucose 116 mg/dL      Calcium 8 6 mg/dL      eGFR 41 ml/min/1 73sq m     Narrative:       Aaron guidelines for Chronic Kidney Disease (CKD):     Stage 1 with normal or high GFR (GFR > 90 mL/min/1 73 square meters)    Stage 2 Mild CKD (GFR = 60-89 mL/min/1 73 square meters)    Stage 3A Moderate CKD (GFR = 45-59 mL/min/1 73 square meters)    Stage 3B Moderate CKD (GFR = 30-44 mL/min/1 73 square meters)    Stage 4 Severe CKD (GFR = 15-29 mL/min/1 73 square meters)    Stage 5 End Stage CKD (GFR <15 mL/min/1 73 square meters)  Note: GFR calculation is accurate only with a steady state creatinine    Troponin I [[id number]]  (Normal) Collected:  02/06/20 1149    Lab Status:  Final result Specimen:  Blood from Arm, Right Updated:  02/06/20 1215     Troponin I <0 02 ng/mL     CBC and differential [[id number]]  (Abnormal) Collected:  02/06/20 1149    Lab Status:  Final result Specimen:  Blood from Arm, Right Updated:  02/06/20 1156     WBC 9 37 Thousand/uL      RBC 4 88 Million/uL      Hemoglobin 14 1 g/dL      Hematocrit 44 2 %      MCV 91 fL      MCH 28 9 pg      MCHC 31 9 g/dL      RDW 13 3 %      MPV 11 5 fL      Platelets 984 Thousands/uL      nRBC 0 /100 WBCs      Neutrophils Relative 82 %      Immat GRANS % 1 %      Lymphocytes Relative 4 %      Monocytes Relative 13 %      Eosinophils Relative 0 %      Basophils Relative 0 %      Neutrophils Absolute 7 71 Thousands/µL      Immature Grans Absolute 0 09 Thousand/uL      Lymphocytes Absolute 0 35 Thousands/µL      Monocytes Absolute 1 17 Thousand/µL      Eosinophils Absolute 0 01 Thousand/µL      Basophils Absolute 0 04 Thousands/µL                  XR chest 1 view portable   ED Interpretation by Cherry Diego DO (02/06 1227)   Limited inspiratory effort, no acute cardiopulmonary process      Final Result by Suly John MD (02/06 1235)      No acute cardiopulmonary disease  Workstation performed: [id number]                    Procedures  ECG 12 Lead Documentation Only  Date/Time: 2/6/2020 11:51 AM  Performed by: Cherry Diego DO  Authorized by:  Cherry Diego DO     Indications / Diagnosis:  SOB  ECG reviewed by me, the ED Provider: yes    Patient location:  ED  Previous ECG:     Previous ECG:  Compared to current    Comparison ECG info:  4/14/2019    Similarity:  No change  Interpretation:     Interpretation: normal    Rate:     ECG rate:  85    ECG rate assessment: normal Rhythm:     Rhythm: sinus rhythm    Ectopy:     Ectopy: none    QRS:     QRS axis:  Left    QRS intervals:  Normal  Conduction:     Conduction: normal    ST segments:     ST segments:  Normal  T waves:     T waves: normal               ED Course  ED Course as of Feb 06 1902   Thu Feb 06, 2020   1240 Patient is stable upon re-evaluation  Reauscultation of lungs demonstrates increased air movement with decreased wheezing that is still persistent  1355 Patient with increased work of breathing again and audible wheezing while getting 2nd DuoNeb  Solu-Medrol ordered      1418 Patient re-evaluated with persistent diminished air movement and wheezes after 2nd treatment  [address] paged for admit      [phone] 2:54 PM  I discussed the case with the hospitalist  We reviewed the HPI, pertinent PMH, ED course and workup   Hospitalist agreed with plan and will admit the patient to the hospital                                     MDM  Number of Diagnoses or Management Options  Dyspnea: new and requires workup  Hypertension: minor  Wheezing: new and requires workup  Diagnosis management comments: Differential diagnosis:  Dyspnea, pneumonia, bronchitis, acute asthma exacerbation, COPD exacerbation, myocardial infarction, anemia, renal failure, dehydration, other    All labs reviewed and utilized in the medical decision making process    All radiology studies independently viewed by me and interpreted by the radiologist          Amount and/or Complexity of Data Reviewed  Clinical lab tests: reviewed and ordered  Tests in the radiology section of CPT®: reviewed and ordered  Tests in the medicine section of CPT®: reviewed and ordered  Decide to obtain previous medical records or to obtain history from someone other than the patient: yes  Discuss the patient with other providers: yes  Independent visualization of images, tracings, or specimens: yes          Disposition  Final diagnoses:   Dyspnea   Wheezing   Hypertension Time reflects when diagnosis was documented in both MDM as applicable and the Disposition within this note     Time User Action Codes Description Comment    2/6/2020  2:22 PM Hulen Ninoska Add [R06 00] Dyspnea     2/6/2020  2:23 PM Hulen Ninoska Add [R06 2] Wheezing     2/6/2020  2:23 PM Ced Rodarte 50 Hypertension       ED Disposition     ED Disposition Condition Date/Time Comment    Admit Stable Thu Feb 6, 2020  2:36 PM Case was discussed with Dr Joanne Duran and the patient's admission status was agreed to be Admission Status: inpatient status to the service of Dr Joanne Duran   Follow-up Information    None         Current Discharge Medication List      CONTINUE these medications which have NOT CHANGED    Details   albuterol (2 5 mg/3 mL) 0 083 % nebulizer solution Take 1 vial (2 5 mg total) by nebulization every 6 (six) hours as needed for wheezing or shortness of breath  Qty: 30 vial, Refills: 5    Associated Diagnoses: Wheezing      levothyroxine 100 mcg tablet take 1 tablet by mouth daily  Qty: 30 tablet, Refills: 5    Associated Diagnoses: Other specified hypothyroidism      lisinopril-hydrochlorothiazide (PRINZIDE,ZESTORETIC) 10-12 5 MG per tablet take 1 tablet by mouth daily  Qty: 30 tablet, Refills: 5    Associated Diagnoses: Essential hypertension      metoprolol tartrate (LOPRESSOR) 25 mg tablet take 1 tablet by mouth twice a day  Qty: 60 tablet, Refills: 5    Associated Diagnoses: Essential hypertension           No discharge procedures on file      ED Provider  Electronically Signed by           Manfred Jean-Baptiste DO  02/06/20 7442

## 2020-02-06 NOTE — RESPIRATORY THERAPY NOTE
RT Protocol Note  Nawaf Dorantes 71 y o  male MRN: [id number]  Unit/Bed#: -01 Encounter: [id number]    Assessment    Principal Problem:    Chronic obstructive pulmonary disease with acute exacerbation ([facility] )  Active Problems:    Essential hypertension    Other specified hypothyroidism    Stage 3 chronic kidney disease ([facility] )      Home Pulmonary Medications:  yes       Past Medical History:   Diagnosis Date    Cellulitis, trunk     Disease of thyroid gland     Gastroenteritis and colitis, viral     last assessed: 11/24/2015    Hernia     Hypertension     Lung mass     Renal mass      Social History     Socioeconomic History    Marital status:       Spouse name: None    Number of children: None    Years of education: None    Highest education level: None   Occupational History    None   Social Needs    Financial resource strain: None    Food insecurity:     Worry: None     Inability: None    Transportation needs:     Medical: None     Non-medical: None   Tobacco Use    Smoking status: Former Smoker    Smokeless tobacco: Former User    Tobacco comment: not in last 12 months  quit smoking arnd 6/2013, used to smoke about 1 pack per week   Substance and Sexual Activity    Alcohol use: Yes     Frequency: Monthly or less     Comment: social  ocasional    Drug use: No    Sexual activity: None   Lifestyle    Physical activity:     Days per week: None     Minutes per session: None    Stress: None   Relationships    Social connections:     Talks on phone: None     Gets together: None     Attends [employer] service: None     Active member of club or organization: None     Attends meetings of clubs or organizations: None     Relationship status: None    Intimate partner violence:     Fear of current or ex partner: None     Emotionally abused: None     Physically abused: None     Forced sexual activity: None   Other Topics Concern    None   Social History Narrative    Caffeine use- drinks at [employer] thermos daily    Dental care, regularly    Exercise: Yard work    Supportive and Safe    No advance directives    Spouse/Family Support       Subjective         Objective    Physical Exam:   Assessment Type: Pre-treatment  General Appearance: Alert, Awake  Respiratory Pattern: Normal  Chest Assessment: Chest expansion symmetrical  Bilateral Breath Sounds: Expiratory wheezes, Inspiratory wheezes  Cough: Non-productive, Dry    Vitals:  Blood pressure 143/81, pulse 87, temperature 98 2 °F (36 8 °C), resp  rate 20, weight 124 kg (273 lb 1 6 oz), SpO2 (!) 88 %  Imaging and other studies: I have personally reviewed pertinent reports              Plan    Respiratory Plan: Home Bronchodilator Patient pathway

## 2020-02-06 NOTE — H&P
[address] Internal Medicine  H&P- Katherin Sarkar 1955, 71 y o  male MRN: [id number]  Unit/Bed#: GALI Encounter: [id number]  Primary Care Provider: Sebastián Mcneal MD   Date and time admitted to hospital: 2/6/2020 11:34 AM    * Chronic obstructive pulmonary disease with acute exacerbation ([facility] )  Assessment & Plan  · Patient has not been previously diagnosed  · Recommend formal studies with Pulmonology outpatient  · Acute exacerbation secondary to viral URI  · Heart on 40 mg IV Solu-Medrol b i d  · Respiratory protocol  · Initiated Breo daily    Stage 3 chronic kidney disease ([facility] )  Assessment & Plan  · Baseline Cr appears to be 1 6-1 8  · Stable  · Continue to monitor    Other specified hypothyroidism  Assessment & Plan  · Continue levothyroxine    Essential hypertension  Assessment & Plan  · Continue lisinopril hydrochlorothiazide and metoprolol        VTE Prophylaxis: Enoxaparin (Lovenox)   Code Status: Full Code  POLST: POLST form is not discussed and not completed at this time  Discussion with family:  Discussed with patient at bedside  Answered all questions to the best of my ability  Anticipated Length of Stay:  Patient will be admitted on an Inpatient basis with an anticipated length of stay of  Greater than 2 midnights  Justification for Hospital Stay: SOB    Total Time for Visit, including Counseling / Coordination of Care: 45 minutes  Greater than 50% of this total time spent on direct patient counseling and coordination of care  Chief Complaint:   SOB    History of Present Illness:    Katherin Sarkar is a 71 y o  male with past medical history of hypertension, hypothyroidism, CKD, previous smoker who used to smoke a pack a day but quit 6 years ago, who presents with shortness of breath for the past couple of days  Patient does not have official diagnosis of COPD but was being treated with nebulizers outpatient for recurrent shortness of breath associated with wheezing    Recommend patient follow-up with pulmonology after discharge for official testing  Will treat with IV steroids, nebulizer treatments  CXR negative for acute cardiopulmonary disease on admission  Patient denies fever, chills  Reports upper respiratory virus that both he and his son have had with associated congestion, rhinorrhea and cough  Suspect this may have triggered COPD exacerbation  Review of Systems:    Review of Systems    Past Medical and Surgical History:     Past Medical History:   Diagnosis Date    Cellulitis, trunk     Disease of thyroid gland     Gastroenteritis and colitis, viral     last assessed: 11/24/2015    Hernia     Hypertension     Lung mass     Renal mass        Past Surgical History:   Procedure Laterality Date    ABDOMINAL ADHESION SURGERY N/A 1/13/2016    Procedure: LYSIS ADHESIONS;  Surgeon: Anthony Gutiérrez MD;  [facility];  Service:     INGUINAL HERNIA REPAIR      LYMPH NODE DISSECTION N/A 1/13/2016    Procedure: DISSECTION/STAGING LYMPH NODE PELVIS/ABDOMEN;  Surgeon: Anthoyn Gutiérrez MD;  [facility];  Service:     MD LAP,PARTIAL NEPHRECTOMY Right 1/13/2016    Procedure: NEPHRECTOMY PARTIAL LAPAROSCOPIC W ROBOTICS, intra op ultrasound,laparoscopic lysis of adhesions;  Surgeon: Anthony Gutiérrez MD;  [facility];  Service: Urology       Meds/Allergies:    Prior to Admission medications    Medication Sig Start Date End Date Taking?  Authorizing Provider   albuterol (2 5 mg/3 mL) 0 083 % nebulizer solution Take 1 vial (2 5 mg total) by nebulization every 6 (six) hours as needed for wheezing or shortness of breath 12/11/19  Yes Shawn Lorenzo MD   levothyroxine 100 mcg tablet take 1 tablet by mouth daily 7/19/19  Yes Shawn Lorenzo MD   lisinopril-hydrochlorothiazide (PRINZIDE,ZESTORETIC) 10-12 5 MG per tablet take 1 tablet by mouth daily 11/16/19  Yes Shawn Lorenzo MD   metoprolol tartrate (LOPRESSOR) 25 mg tablet take 1 tablet by mouth twice a day 2/5/19  Yes Lakesha Scherer Lisa Chu MD     I have reviewed home medications with patient personally  Allergies: Allergies   Allergen Reactions    Metoclopramide Anxiety       Social History:     Marital Status:    Patient Pre-hospital Living Situation:  Home with family  Patient Pre-hospital Level of Mobility:  Independent  Patient Pre-hospital Diet Restrictions:  None  Substance Use History:   Social History     Substance and Sexual Activity   Alcohol Use Yes    Comment: social  ocasional     Social History     Tobacco Use   Smoking Status Former Smoker   Smokeless Tobacco Former User   Tobacco Comment    not in last 12 months  quit smoking arnd 6/2013, used to smoke about 1 pack per week     Social History     Substance and Sexual Activity   Drug Use No       Family History:    Family History   Problem Relation Age of Onset    Lung cancer Father     Melanoma Father     Other Father         renal insufficiency    Dementia Mother     Mental illness Neg Hx     Substance Abuse Neg Hx        Physical Exam:     Vitals:   Blood Pressure: 143/80 (02/06/20 1355)  Pulse: 68 (02/06/20 1355)  Temperature: 98 4 °F (36 9 °C) (02/06/20 1139)  Temp Source: Temporal (02/06/20 1139)  Respirations: 20 (02/06/20 1355)  Weight - Scale: 124 kg (273 lb 1 6 oz) (02/06/20 1139)  SpO2: 92 % (02/06/20 1355)    Physical Exam   Constitutional: He appears well-developed and well-nourished  No distress  HENT:   Head: Normocephalic and atraumatic  Eyes: Conjunctivae are normal  No scleral icterus  Cardiovascular: Normal rate and regular rhythm  No murmur heard  Pulmonary/Chest: Effort normal  No respiratory distress  He has decreased breath sounds  He has wheezes  He has no rales  Abdominal: Soft  He exhibits no distension  There is no tenderness  Musculoskeletal: He exhibits no edema  Neurological: He is alert  Skin: Skin is warm and dry  No erythema  Psychiatric: He has a normal mood and affect     Nursing note and vitals reviewed  Additional Data:     Lab Results: I have personally reviewed pertinent reports  Results from last 7 days   Lab Units 02/06/20  1149   WBC Thousand/uL 9 37   HEMOGLOBIN g/dL 14 1   HEMATOCRIT % 44 2   PLATELETS Thousands/uL 181   NEUTROS PCT % 82*   LYMPHS PCT % 4*   MONOS PCT % 13*   EOS PCT % 0     Results from last 7 days   Lab Units 02/06/20  1149   SODIUM mmol/L 140   POTASSIUM mmol/L 4 0   CHLORIDE mmol/L 103   CO2 mmol/L 28   BUN mg/dL 18   CREATININE mg/dL 1 72*   ANION GAP mmol/L 9   CALCIUM mg/dL 8 6   GLUCOSE RANDOM mg/dL 116                       Imaging: I have personally reviewed pertinent reports  XR chest 1 view portable   ED Interpretation by Nathan Gentile DO (02/06 1227)   Limited inspiratory effort, no acute cardiopulmonary process      Final Result by Danny Benitez MD (02/06 1235)      No acute cardiopulmonary disease  Workstation performed: [id number]             EKG, Pathology, and Other Studies Reviewed on Admission:   · EKG: NSR 85bpm    Allscripts / Epic Records Reviewed: Yes     ** Please Note: This note has been constructed using a voice recognition system   **

## 2020-02-06 NOTE — ASSESSMENT & PLAN NOTE
· Patient has not been previously diagnosed  · Recommend formal studies with Pulmonology outpatient  · Acute exacerbation secondary to viral URI  · Heart on 40 mg IV Solu-Medrol b i d    · Respiratory protocol  · Initiated Breo daily

## 2020-02-07 PROBLEM — J96.01 ACUTE RESPIRATORY FAILURE WITH HYPOXIA (HCC): Status: ACTIVE | Noted: 2020-02-07

## 2020-02-07 PROBLEM — J20.9 ACUTE BRONCHITIS WITH BRONCHOSPASM: Status: ACTIVE | Noted: 2020-02-06

## 2020-02-07 LAB
ANION GAP SERPL CALCULATED.3IONS-SCNC: 11 MMOL/L (ref 4–13)
BASOPHILS # BLD AUTO: 0.02 THOUSANDS/ΜL (ref 0–0.1)
BASOPHILS NFR BLD AUTO: 0 % (ref 0–1)
BUN SERPL-MCNC: 26 MG/DL (ref 5–25)
CALCIUM SERPL-MCNC: 8 MG/DL (ref 8.3–10.1)
CHLORIDE SERPL-SCNC: 104 MMOL/L (ref 100–108)
CO2 SERPL-SCNC: 26 MMOL/L (ref 21–32)
CREAT SERPL-MCNC: 1.75 MG/DL (ref 0.6–1.3)
EOSINOPHIL # BLD AUTO: 0 THOUSAND/ΜL (ref 0–0.61)
EOSINOPHIL NFR BLD AUTO: 0 % (ref 0–6)
ERYTHROCYTE [DISTWIDTH] IN BLOOD BY AUTOMATED COUNT: 13.5 % (ref 11.6–15.1)
FLUAV RNA NPH QL NAA+PROBE: DETECTED
FLUBV RNA NPH QL NAA+PROBE: ABNORMAL
GFR SERPL CREATININE-BSD FRML MDRD: 40 ML/MIN/1.73SQ M
GLUCOSE SERPL-MCNC: 143 MG/DL (ref 65–140)
GLUCOSE SERPL-MCNC: 164 MG/DL (ref 65–140)
GLUCOSE SERPL-MCNC: 195 MG/DL (ref 65–140)
HCT VFR BLD AUTO: 43.2 % (ref 36.5–49.3)
HGB BLD-MCNC: 13.5 G/DL (ref 12–17)
IMM GRANULOCYTES # BLD AUTO: 0.18 THOUSAND/UL (ref 0–0.2)
IMM GRANULOCYTES NFR BLD AUTO: 2 % (ref 0–2)
LYMPHOCYTES # BLD AUTO: 0.31 THOUSANDS/ΜL (ref 0.6–4.47)
LYMPHOCYTES NFR BLD AUTO: 3 % (ref 14–44)
MCH RBC QN AUTO: 29 PG (ref 26.8–34.3)
MCHC RBC AUTO-ENTMCNC: 31.3 G/DL (ref 31.4–37.4)
MCV RBC AUTO: 93 FL (ref 82–98)
MONOCYTES # BLD AUTO: 0.56 THOUSAND/ΜL (ref 0.17–1.22)
MONOCYTES NFR BLD AUTO: 6 % (ref 4–12)
NEUTROPHILS # BLD AUTO: 8.66 THOUSANDS/ΜL (ref 1.85–7.62)
NEUTS SEG NFR BLD AUTO: 89 % (ref 43–75)
NRBC BLD AUTO-RTO: 0 /100 WBCS
PLATELET # BLD AUTO: 187 THOUSANDS/UL (ref 149–390)
PMV BLD AUTO: 12.1 FL (ref 8.9–12.7)
POTASSIUM SERPL-SCNC: 3.9 MMOL/L (ref 3.5–5.3)
PROCALCITONIN SERPL-MCNC: 0.41 NG/ML
RBC # BLD AUTO: 4.65 MILLION/UL (ref 3.88–5.62)
RSV RNA NPH QL NAA+PROBE: ABNORMAL
SODIUM SERPL-SCNC: 141 MMOL/L (ref 136–145)
WBC # BLD AUTO: 9.73 THOUSAND/UL (ref 4.31–10.16)

## 2020-02-07 PROCEDURE — 94640 AIRWAY INHALATION TREATMENT: CPT

## 2020-02-07 PROCEDURE — 84145 PROCALCITONIN (PCT): CPT | Performed by: PHYSICIAN ASSISTANT

## 2020-02-07 PROCEDURE — 99232 SBSQ HOSP IP/OBS MODERATE 35: CPT | Performed by: PHYSICIAN ASSISTANT

## 2020-02-07 PROCEDURE — 87631 RESP VIRUS 3-5 TARGETS: CPT | Performed by: FAMILY MEDICINE

## 2020-02-07 PROCEDURE — 80048 BASIC METABOLIC PNL TOTAL CA: CPT | Performed by: PHYSICIAN ASSISTANT

## 2020-02-07 PROCEDURE — ND001 PR NO DOCUMENTATION: Performed by: PHYSICIAN ASSISTANT

## 2020-02-07 PROCEDURE — 82948 REAGENT STRIP/BLOOD GLUCOSE: CPT

## 2020-02-07 PROCEDURE — 85025 COMPLETE CBC W/AUTO DIFF WBC: CPT | Performed by: PHYSICIAN ASSISTANT

## 2020-02-07 PROCEDURE — 94664 DEMO&/EVAL PT USE INHALER: CPT

## 2020-02-07 PROCEDURE — 94760 N-INVAS EAR/PLS OXIMETRY 1: CPT

## 2020-02-07 RX ORDER — OSELTAMIVIR PHOSPHATE 30 MG/1
30 CAPSULE ORAL EVERY 12 HOURS SCHEDULED
Status: DISCONTINUED | OUTPATIENT
Start: 2020-02-07 | End: 2020-02-09 | Stop reason: HOSPADM

## 2020-02-07 RX ORDER — AZITHROMYCIN 250 MG/1
500 TABLET, FILM COATED ORAL EVERY 24 HOURS
Status: DISCONTINUED | OUTPATIENT
Start: 2020-02-07 | End: 2020-02-07

## 2020-02-07 RX ORDER — AZITHROMYCIN 250 MG/1
500 TABLET, FILM COATED ORAL EVERY 24 HOURS
Status: DISCONTINUED | OUTPATIENT
Start: 2020-02-08 | End: 2020-02-07

## 2020-02-07 RX ORDER — AZITHROMYCIN 250 MG/1
500 TABLET, FILM COATED ORAL EVERY 24 HOURS
Status: COMPLETED | OUTPATIENT
Start: 2020-02-07 | End: 2020-02-08

## 2020-02-07 RX ORDER — METHYLPREDNISOLONE SODIUM SUCCINATE 40 MG/ML
40 INJECTION, POWDER, LYOPHILIZED, FOR SOLUTION INTRAMUSCULAR; INTRAVENOUS EVERY 8 HOURS SCHEDULED
Status: DISCONTINUED | OUTPATIENT
Start: 2020-02-07 | End: 2020-02-08

## 2020-02-07 RX ORDER — GUAIFENESIN 600 MG
1200 TABLET, EXTENDED RELEASE 12 HR ORAL EVERY 12 HOURS SCHEDULED
Status: DISCONTINUED | OUTPATIENT
Start: 2020-02-07 | End: 2020-02-09 | Stop reason: HOSPADM

## 2020-02-07 RX ADMIN — IPRATROPIUM BROMIDE AND ALBUTEROL SULFATE 3 ML: 2.5; .5 SOLUTION RESPIRATORY (INHALATION) at 09:14

## 2020-02-07 RX ADMIN — METOPROLOL TARTRATE 25 MG: 25 TABLET ORAL at 10:06

## 2020-02-07 RX ADMIN — BENZONATATE 200 MG: 100 CAPSULE ORAL at 15:00

## 2020-02-07 RX ADMIN — GUAIFENESIN 1200 MG: 600 TABLET ORAL at 10:06

## 2020-02-07 RX ADMIN — METOPROLOL TARTRATE 25 MG: 25 TABLET ORAL at 17:37

## 2020-02-07 RX ADMIN — IPRATROPIUM BROMIDE AND ALBUTEROL SULFATE 3 ML: 2.5; .5 SOLUTION RESPIRATORY (INHALATION) at 14:10

## 2020-02-07 RX ADMIN — IPRATROPIUM BROMIDE AND ALBUTEROL SULFATE 3 ML: 2.5; .5 SOLUTION RESPIRATORY (INHALATION) at 01:24

## 2020-02-07 RX ADMIN — LEVOTHYROXINE SODIUM 100 MCG: 100 TABLET ORAL at 06:21

## 2020-02-07 RX ADMIN — OSELTAMIVIR PHOSPHATE 30 MG: 30 CAPSULE ORAL at 12:21

## 2020-02-07 RX ADMIN — METHYLPREDNISOLONE SODIUM SUCCINATE 40 MG: 40 INJECTION, POWDER, FOR SOLUTION INTRAMUSCULAR; INTRAVENOUS at 17:37

## 2020-02-07 RX ADMIN — BENZONATATE 200 MG: 100 CAPSULE ORAL at 20:21

## 2020-02-07 RX ADMIN — ENOXAPARIN SODIUM 40 MG: 40 INJECTION SUBCUTANEOUS at 10:07

## 2020-02-07 RX ADMIN — FLUTICASONE FUROATE AND VILANTEROL TRIFENATATE 1 PUFF: 100; 25 POWDER RESPIRATORY (INHALATION) at 10:31

## 2020-02-07 RX ADMIN — LISINOPRIL 10 MG: 10 TABLET ORAL at 10:06

## 2020-02-07 RX ADMIN — OSELTAMIVIR PHOSPHATE 30 MG: 30 CAPSULE ORAL at 20:21

## 2020-02-07 RX ADMIN — AZITHROMYCIN 500 MG: 250 TABLET, FILM COATED ORAL at 14:52

## 2020-02-07 RX ADMIN — METHYLPREDNISOLONE SODIUM SUCCINATE 40 MG: 40 INJECTION, POWDER, FOR SOLUTION INTRAMUSCULAR; INTRAVENOUS at 10:05

## 2020-02-07 RX ADMIN — IPRATROPIUM BROMIDE AND ALBUTEROL SULFATE 3 ML: 2.5; .5 SOLUTION RESPIRATORY (INHALATION) at 21:33

## 2020-02-07 RX ADMIN — BENZONATATE 200 MG: 100 CAPSULE ORAL at 10:06

## 2020-02-07 RX ADMIN — GUAIFENESIN 1200 MG: 600 TABLET ORAL at 20:21

## 2020-02-07 RX ADMIN — HYDROCHLOROTHIAZIDE 12.5 MG: 12.5 TABLET ORAL at 10:06

## 2020-02-07 NOTE — PLAN OF CARE
Problem: PAIN - ADULT  Goal: Verbalizes/displays adequate comfort level or baseline comfort level  Description  Interventions:  - Encourage patient to monitor pain and request assistance  - Assess pain using appropriate pain scale  - Administer analgesics based on type and severity of pain and evaluate response  - Implement non-pharmacological measures as appropriate and evaluate response  - Consider cultural and social influences on pain and pain management  - Notify physician/advanced practitioner if interventions unsuccessful or patient reports new pain  Outcome: Progressing     Problem: INFECTION - ADULT  Goal: Absence or prevention of progression during hospitalization  Description  INTERVENTIONS:  - Assess and monitor for signs and symptoms of infection  - Monitor lab/diagnostic results  - Monitor all insertion sites, i e  indwelling lines, tubes, and drains  - Monitor endotracheal if appropriate and nasal secretions for changes in amount and color  - [place] appropriate cooling/warming therapies per order  - Administer medications as ordered  - Instruct and encourage patient and family to use good hand hygiene technique  - Identify and instruct in appropriate isolation precautions for identified infection/condition  Outcome: Progressing  Goal: Absence of fever/infection during neutropenic period  Description  INTERVENTIONS:  - Monitor WBC    Outcome: Progressing     Problem: DISCHARGE PLANNING  Goal: Discharge to home or other facility with appropriate resources  Description  INTERVENTIONS:  - Identify barriers to discharge w/patient and caregiver  - Arrange for needed discharge resources and transportation as appropriate  - Identify discharge learning needs (meds, wound care, etc )  - Arrange for interpretive services to assist at discharge as needed  - Refer to Case Management Department for coordinating discharge planning if the patient needs post-hospital services based on physician/advanced practitioner order or complex needs related to functional status, cognitive ability, or social support system  Outcome: Progressing     Problem: Knowledge Deficit  Goal: Patient/family/caregiver demonstrates understanding of disease process, treatment plan, medications, and discharge instructions  Description  Complete learning assessment and assess knowledge base    Interventions:  - Provide teaching at level of understanding  - Provide teaching via preferred learning methods  Outcome: Progressing

## 2020-02-07 NOTE — ASSESSMENT & PLAN NOTE
· Presented with wheezing, shortness of breath - also component of likely asthma versus COPD  Likely in setting of URI  · Recommend formal studies with Pulmonology outpatient - patient previously has not yet completed PFTs/spirometry  · Previously has only been on albuterol PRN  · CXR: no acute processes noted  · Received zithromax on admission - will continue for total of 3 days  · Received loading dose steroids in ED    Was then started on 40 mg IV BID solu-medrol - patient still with significant bronchospasm on exam, will increase to q 8 hour today and re-assess tomorrow  · Increase mucinex to 1200 mg BID  · Scheduled duo-neb q 6 hours  · Scheduled tessalon perles  · Respiratory protocol  · Rule out influenza/RSV  · Initiated Breo daily

## 2020-02-07 NOTE — ASSESSMENT & PLAN NOTE
· Presented with wheezing, shortness of breath - also component of likely asthma versus COPD  Likely in setting of influenza  · Recommend formal studies with Pulmonology outpatient - patient previously has not yet completed PFTs/spirometry  · Previously has only been on albuterol PRN  · CXR: no acute processes noted  · Received zithromax on admission - will continue for total of 3 days  · Received loading dose steroids in ED    Increased solu-medrol yesterday to q 8 hours - wheezing much improved on exam   Will decrease Solu-Medrol back to 40 mg BID  · Increased mucinex to 1200 mg BID  · Continue duo-neb q 6 hours  · Scheduled tessalon perles  · Respiratory protocol  · Initiated Breo daily

## 2020-02-07 NOTE — ASSESSMENT & PLAN NOTE
· Likely due to URI, bronchospasm in setting of suspected asthma vs COPD  · Required 2 L nasal cannula  · SpO2 currently 94% on RA - resolved

## 2020-02-07 NOTE — PROGRESS NOTES
Progress Note - Manuela Sharma 1955, 71 y o  male MRN: [id number]    Unit/Bed#: -01 Encounter: [id number]    Primary Care Provider: Lilliam Alvarez MD   Date and time admitted to hospital: 2/6/2020 11:34 AM    Addendum 1200:  Patient influenza positive - influenza A  Will initiate on renally dosed tamiflu given comorbidities  Further supportive care as noted below    * Acute respiratory failure with hypoxia (HCC)  Assessment & Plan  · Likely due to URI, bronchospasm in setting of suspected asthma vs COPD  · Currently requiring 2 L nasal cannula - wean as able  · Continue treatment for bronchitis as above  · Continue O2 supplementation for SpO2 > 88%  Acute bronchitis with bronchospasm  Assessment & Plan  · Presented with wheezing, shortness of breath - also component of likely asthma versus COPD  Likely in setting of URI  · Recommend formal studies with Pulmonology outpatient - patient previously has not yet completed PFTs/spirometry  · Previously has only been on albuterol PRN  · CXR: no acute processes noted  · Received zithromax on admission - will continue for total of 3 days  · Received loading dose steroids in ED  Was then started on 40 mg IV BID solu-medrol - patient still with significant bronchospasm on exam, will increase to q 8 hour today and re-assess tomorrow  · Increase mucinex to 1200 mg BID  · Scheduled duo-neb q 6 hours  · Scheduled tessalon perles  · Respiratory protocol  · Rule out influenza/RSV  · Initiated Breo daily    Stage 3 chronic kidney disease ([address] )  Assessment & Plan  · Baseline Cr appears to be 1 6-1 8  · Creatinine today 1 75 - stable  · Continue to monitor  Mild obstructive sleep apnea  Assessment & Plan  · Continue CPAP  Other specified hypothyroidism  Assessment & Plan  · Continue levothyroxine    Essential hypertension  Assessment & Plan  · Blood pressure stable    · Continue lisinopril hydrochlorothiazide and metoprolol      VTE Pharmacologic Prophylaxis: Pharmacologic: Enoxaparin (Lovenox)  Mechanical VTE Prophylaxis in Place: Yes    Patient Centered Rounds: I have performed bedside rounds with nursing staff today  Discussions with Specialists or Other Care Team Provider: Nursing, Attending    Education and Discussions with Family / Patient: Discussed with patient directly at bedside  All questions and concerns answered to the best of my ability  Time Spent for Care: 30 minutes  More than 50% of total time spent on counseling and coordination of care as described above  Current Length of Stay: 1 day(s)    Current Patient Status: Inpatient   Certification Statement: The patient will continue to require additional inpatient hospital stay due to IV steroids, oxygen supplementation, scheduled nebulizer treatments    Discharge Plan: Likely discharge 24-48 hours pending response to IV steroids, scheduled breathing treatments as noted above    Code Status: Level 1 - Full Code      Subjective:   "I feel about the same "    Patient states he only feels slightly better since yesterday  States he has not had a breathing treatment yet this morning  Does not wear oxygen at home  Denies chest pain/palpitations, nausea/vomiting, abdominal pain  Reports continued coughing fits  Objective:     Vitals:   Temp (24hrs), Av 1 °F (36 7 °C), Min:97 5 °F (36 4 °C), Max:98 4 °F (36 9 °C)    Temp:  [97 5 °F (36 4 °C)-98 4 °F (36 9 °C)] 97 5 °F (36 4 °C)  HR:  [68-88] 76  Resp:  [20-24] 20  BP: (120-143)/(66-82) 135/75  SpO2:  [87 %-99 %] 90 %  Body mass index is 38 09 kg/m²  Input and Output Summary (last 24 hours):     No intake or output data in the 24 hours ending 20 1008    Physical Exam:     Physical Exam   Constitutional: He is oriented to person, place, and time  Vital signs are normal  He appears well-developed  Nasal cannula in place  Appears comfortable, no acute distress   HENT:   Head: Normocephalic     Eyes: Pupils are equal, round, and reactive to light  Conjunctivae and EOM are normal  No scleral icterus  Neck: Normal range of motion  Cardiovascular: Normal rate, regular rhythm and normal heart sounds  No murmur heard  Pulmonary/Chest: Effort normal  No respiratory distress  He has decreased breath sounds  He has wheezes  Diminished breath sounds bilaterally  Expiratory wheezes  Abdominal: Soft  Bowel sounds are normal  There is no tenderness  There is no rigidity, no rebound and no guarding  Musculoskeletal: He exhibits no edema, tenderness or deformity  Able to ambulate without difficulty   Neurological: He is alert and oriented to person, place, and time  Skin: Skin is warm and dry  Psychiatric: He has a normal mood and affect  His speech is normal and behavior is normal    Nursing note and vitals reviewed  Additional Data:     Labs:    Results from last 7 days   Lab Units 02/07/20  0610   WBC Thousand/uL 9 73   HEMOGLOBIN g/dL 13 5   HEMATOCRIT % 43 2   PLATELETS Thousands/uL 187   NEUTROS PCT % 89*   LYMPHS PCT % 3*   MONOS PCT % 6   EOS PCT % 0     Results from last 7 days   Lab Units 02/07/20  0610   SODIUM mmol/L 141   POTASSIUM mmol/L 3 9   CHLORIDE mmol/L 104   CO2 mmol/L 26   BUN mg/dL 26*   CREATININE mg/dL 1 75*   ANION GAP mmol/L 11   CALCIUM mg/dL 8 0*   GLUCOSE RANDOM mg/dL 164*         Results from last 7 days   Lab Units 02/07/20  0731   POC GLUCOSE mg/dl 143*                   * I Have Reviewed All Lab Data Listed Above  * Additional Pertinent Lab Tests Reviewed:  All Labs Within Last 24 Hours Reviewed    Imaging:    Imaging Reports Reviewed Today Include: CXR  Imaging Personally Reviewed by Myself Includes:  CXR    Recent Cultures (last 7 days):           Last 24 Hours Medication List:     Current Facility-Administered Medications:  acetaminophen 650 mg Oral Q6H PRN Sara PLAZA PA-C   albuterol 2 5 mg Nebulization Q4H PRN Caesar Liu DO   azithromycin 500 mg Oral Q24H Teresita Gonzalez HINA   benzonatate 200 mg Oral TID Harrie Severance V, PA-C   enoxaparin 40 mg Subcutaneous Daily Yesica PLAZA PA-C   fluticasone-vilanterol 1 puff Inhalation Daily Yesica PLAZA PA-C   guaiFENesin 1,200 mg Oral Q12H [facility] Matthew Nieto PA-C   hydrochlorothiazide 12 5 mg Oral Daily Yesica PLAZA PA-C   ipratropium-albuterol 3 mL Nebulization Q6H Mireille Feeling, DO   levothyroxine 100 mcg Oral Early Morning Yesica PLAZA PA-C   lisinopril 10 mg Oral Daily Yesica PLAZA PA-C   methylPREDNISolone sodium succinate 40 mg Intravenous [facility] Boy Melvin PA-C   metoprolol tartrate 25 mg Oral BID Hugo Morocho PA-C        Today, Patient Was Seen By: Matthew Nieto PA-C    ** Please Note: Dictation voice to text software may have been used in the creation of this document   **
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
## 2020-02-07 NOTE — UTILIZATION REVIEW
Initial Clinical Review    Admission: Date/Time/Statement: Admission Orders (From admission, onward)     Ordered        02/06/20 1440  Inpatient Admission (expected length of stay for this patient Order details is greater than two midnights)  Once                   Orders Placed This Encounter   Procedures    Inpatient Admission (expected length of stay for this patient Order details is greater than two midnights)     Standing Status:   Standing     Number of Occurrences:   1     Order Specific Question:   Admitting Physician     Answer:   Arin Contreras [[id number]]     Order Specific Question:   Level of Care     Answer:   Med Surg [[id number]]     Order Specific Question:   Estimated length of stay     Answer:   More than 2 Midnights     Order Specific Question:   Certification     Answer:   I certify that inpatient services are medically necessary for this patient for a duration of greater than two midnights  See H&P and MD Progress Notes for additional information about the patient's course of treatment  ED Arrival Information     Expected Arrival Acuity Means of Arrival Escorted By Service Admission Type    - 2/6/2020 11:29 Urgent Walk-In Self General Medicine Urgent    Arrival Complaint    SOB, cough        Chief Complaint   Patient presents with    Shortness of Breath     c/o SOB and cough starting yesterday  Pt denies chest pain, but does c/o pain when coughing  Pt poor historian  Assessment/Plan: this is a 71year old male from home to ED admitted to inpatient due to COPD exacerbation secondary to viral URI  Presented due to worsening shortness of breath and cough since yesterday despite using nebulizer at home  On exam tachypnea  Diffuse decreased breath sounds and wheezes  Pitting bilateral edema  NT-proBNP 326  Bun 18, creatinine 1 72 with baseline of 1 6- 1 8   2 duonebs and 1 albuterol nebulizer given in ED, as well as IV steroids, and post treatment wheezing persisted    IV steroids, Breo, scheduled nebulizer in progress  On 2/7/2020 - has continued shortness of breath, coughing fits  needing oxygen 2 liters for hypoxia on room air  On exam with significant bronchospasm and solu medrol increased from q 12h to q 8h   mucinex increased, scheduled duo nebs continue  ED Triage Vitals [02/06/20 1139]   Temperature Pulse Respirations Blood Pressure SpO2   98 4 °F (36 9 °C) 81 (!) 24 125/82 93 %      Temp Source Heart Rate Source Patient Position - Orthostatic VS BP Location FiO2 (%)   Temporal Monitor Lying Right arm --      Pain Score       No Pain        Wt Readings from Last 1 Encounters:   02/06/20 124 kg (273 lb 1 6 oz)     Additional Vital Signs: oxygen 2 liters     02/07/20 07:31:57  97 5 °F (36 4 °C)  76  20  135/75  95  91 %  Nasal cannula  Sitting   02/07/20 0126    81  20      90 %  Nasal cannula     02/07/20 01:11:53  98 3 °F (36 8 °C)  80  21  139/76  97  92 %       02/06/20 2029    81  20      94 %  Nasal cannula     02/06/20 1956            91 %  Nasal cannula     02/1955            87 %Abnormal   None (Room air)     02/06/20 1655            95 %  None (Room air)     02/06/20 15:45:29  98 2 °F (36 8 °C)  87    143/81  102  88 %Abnormal            Pertinent Labs/Diagnostic Test Results:   2/6/2020 CxR - No acute cardiopulmonary disease    2/6/2020 EKG - Normal sinus rhythm with sinus arrhythmia  Left axis deviation  Incomplete right bundle branch block  Abnormal ECG  When compared with ECG of 14-APR-2019 20:45,  No significant change was found  Results from last 7 days   Lab Units 02/07/20  0610 02/06/20  1149   WBC Thousand/uL 9 73 9 37   HEMOGLOBIN g/dL 13 5 14 1   HEMATOCRIT % 43 2 44 2   PLATELETS Thousands/uL 187 181   NEUTROS ABS Thousands/µL 8 66* 7 71*     Results from last 7 days   Lab Units 02/07/20  0610 02/06/20  1149   SODIUM mmol/L 141 140   POTASSIUM mmol/L 3 9 4 0   CHLORIDE mmol/L 104 103   CO2 mmol/L 26 28   ANION GAP mmol/L 11 9   BUN mg/dL 26* 18   CREATININE mg/dL 1 75* 1 72*   EGFR ml/min/1 73sq m 40 41   CALCIUM mg/dL 8 0* 8 6     Results from last 7 days   Lab Units 02/07/20  0731   POC GLUCOSE mg/dl 143*     Results from last 7 days   Lab Units 02/07/20  0610 02/06/20  1149   GLUCOSE RANDOM mg/dL 164* 116     Results from last 7 days   Lab Units 02/06/20  1149   TROPONIN I ng/mL <0 02     Results from last 7 days   Lab Units 02/06/20  1149   NT-PRO BNP pg/mL 326*     Results from last 7 days   Lab Units 02/07/20  1104   INFLUENZA A PCR  Detected*   INFLUENZA B PCR  None Detected   RSV PCR  None Detected     ED Treatment:   Medication Administration from 02/06/2020 1129 to 02/06/2020 1542       Date/Time Order Dose Route Action Comments     02/06/2020 1150 ipratropium-albuterol (DUO-NEB) 0 5-2 5 mg/3 mL inhalation solution 3 mL 3 mL Nebulization Given      02/06/2020 1353 ipratropium-albuterol (DUO-NEB) 0 5-2 5 mg/3 mL inhalation solution 3 mL 3 mL Nebulization Given      02/06/2020 1402 methylPREDNISolone sodium succinate (Solu-MEDROL) injection 125 mg 125 mg Intravenous Given      02/06/2020 1447 azithromycin (ZITHROMAX) 500 mg in sodium chloride 0 9% 250mL IVPB 500 mg 500 mg Intravenous New Bag      02/06/2020 1444 albuterol inhalation solution 5 mg 5 mg Nebulization Given         Past Medical History:   Diagnosis Date    Cellulitis, trunk     Disease of thyroid gland     Gastroenteritis and colitis, viral     last assessed: 11/24/2015    Hernia     Hypertension     Lung mass     Renal mass      Present on Admission:   Other specified hypothyroidism   Essential hypertension   Acute bronchitis with bronchospasm   Acute respiratory failure with hypoxia (HCC)   Mild obstructive sleep apnea      Admitting Diagnosis: Wheezing [R06 2]  Dyspnea [R06 00]  SOB (shortness of breath) [R06 02]  Hypertension [I10]  Age/Sex: 71 y o  male  Admission Orders: 2/6/2020 1440 Inpatient   Scheduled Medications:  Medications:  azithromycin 500 mg Oral Q24H   benzonatate 200 mg Oral TID   enoxaparin 40 mg Subcutaneous Daily   fluticasone-vilanterol 1 puff Inhalation Daily   guaiFENesin 1,200 mg Oral Q12H WIL   hydrochlorothiazide 12 5 mg Oral Daily   ipratropium-albuterol 3 mL Nebulization Q6H   levothyroxine 100 mcg Oral Early Morning   lisinopril 10 mg Oral Daily   methylPREDNISolone sodium succinate 40 mg Intravenous Q8H [address]   metoprolol tartrate 25 mg Oral BID     Continuous IV Infusions: none     PRN Meds:  acetaminophen 650 mg Oral Q6H PRN   Albuterol - used x 1  2 5 mg Nebulization Q4H PRN     Home CPAP at [facility] Utilization Review Department  [email or web address]  ATTENTION: Please call with any questions or concerns to [phone] and carefully listen to the prompts so that you are directed to the right person  All voicemails are confidential   Rebeca Aponte all requests for admission clinical reviews, approved or denied determinations and any other requests to dedicated fax number below belonging to the campus where the patient is receiving treatment   List of dedicated fax numbers for the Facilities:  [address] DENIALS (Administrative/Medical Necessity) [phone]   [address] (Maternity/NICU/Pediatrics) [phone]   Esau Muñoz [phone]   Ana Caller [phone]   Rosina Sears [phone]   Marcy Chavez [phone]   [address] [place] [address] [phone]   [facility]  [phone]   [address] [facility]  [address] [phone]

## 2020-02-07 NOTE — ASSESSMENT & PLAN NOTE
· Likely due to URI, bronchospasm in setting of suspected asthma vs COPD  · Currently requiring 2 L nasal cannula - wean as able  · Continue treatment for bronchitis as above  · Continue O2 supplementation for SpO2 > 88%

## 2020-02-08 PROBLEM — J96.01 ACUTE RESPIRATORY FAILURE WITH HYPOXIA (HCC): Status: RESOLVED | Noted: 2020-02-07 | Resolved: 2020-02-08

## 2020-02-08 PROBLEM — D72.829 LEUKOCYTOSIS: Status: ACTIVE | Noted: 2020-02-08

## 2020-02-08 PROBLEM — J10.1 INFLUENZA A: Status: ACTIVE | Noted: 2020-02-08

## 2020-02-08 LAB
ANION GAP SERPL CALCULATED.3IONS-SCNC: 7 MMOL/L (ref 4–13)
BASOPHILS # BLD AUTO: 0.05 THOUSANDS/ΜL (ref 0–0.1)
BASOPHILS NFR BLD AUTO: 0 % (ref 0–1)
BUN SERPL-MCNC: 32 MG/DL (ref 5–25)
CALCIUM SERPL-MCNC: 8.5 MG/DL (ref 8.3–10.1)
CHLORIDE SERPL-SCNC: 106 MMOL/L (ref 100–108)
CO2 SERPL-SCNC: 30 MMOL/L (ref 21–32)
CREAT SERPL-MCNC: 1.69 MG/DL (ref 0.6–1.3)
EOSINOPHIL # BLD AUTO: 0 THOUSAND/ΜL (ref 0–0.61)
EOSINOPHIL NFR BLD AUTO: 0 % (ref 0–6)
ERYTHROCYTE [DISTWIDTH] IN BLOOD BY AUTOMATED COUNT: 13.7 % (ref 11.6–15.1)
GFR SERPL CREATININE-BSD FRML MDRD: 42 ML/MIN/1.73SQ M
GLUCOSE SERPL-MCNC: 142 MG/DL (ref 65–140)
HCT VFR BLD AUTO: 45.5 % (ref 36.5–49.3)
HGB BLD-MCNC: 14.2 G/DL (ref 12–17)
IMM GRANULOCYTES # BLD AUTO: 0.2 THOUSAND/UL (ref 0–0.2)
IMM GRANULOCYTES NFR BLD AUTO: 1 % (ref 0–2)
LYMPHOCYTES # BLD AUTO: 0.47 THOUSANDS/ΜL (ref 0.6–4.47)
LYMPHOCYTES NFR BLD AUTO: 2 % (ref 14–44)
MCH RBC QN AUTO: 29.2 PG (ref 26.8–34.3)
MCHC RBC AUTO-ENTMCNC: 31.2 G/DL (ref 31.4–37.4)
MCV RBC AUTO: 94 FL (ref 82–98)
MONOCYTES # BLD AUTO: 1.99 THOUSAND/ΜL (ref 0.17–1.22)
MONOCYTES NFR BLD AUTO: 10 % (ref 4–12)
NEUTROPHILS # BLD AUTO: 17.53 THOUSANDS/ΜL (ref 1.85–7.62)
NEUTS SEG NFR BLD AUTO: 87 % (ref 43–75)
NRBC BLD AUTO-RTO: 0 /100 WBCS
PLATELET # BLD AUTO: 209 THOUSANDS/UL (ref 149–390)
PMV BLD AUTO: 11.7 FL (ref 8.9–12.7)
POTASSIUM SERPL-SCNC: 4.4 MMOL/L (ref 3.5–5.3)
PROCALCITONIN SERPL-MCNC: 0.26 NG/ML
RBC # BLD AUTO: 4.86 MILLION/UL (ref 3.88–5.62)
SODIUM SERPL-SCNC: 143 MMOL/L (ref 136–145)
WBC # BLD AUTO: 20.24 THOUSAND/UL (ref 4.31–10.16)

## 2020-02-08 PROCEDURE — 85025 COMPLETE CBC W/AUTO DIFF WBC: CPT | Performed by: NURSE PRACTITIONER

## 2020-02-08 PROCEDURE — 80048 BASIC METABOLIC PNL TOTAL CA: CPT | Performed by: NURSE PRACTITIONER

## 2020-02-08 PROCEDURE — 94640 AIRWAY INHALATION TREATMENT: CPT

## 2020-02-08 PROCEDURE — 94760 N-INVAS EAR/PLS OXIMETRY 1: CPT

## 2020-02-08 PROCEDURE — 99232 SBSQ HOSP IP/OBS MODERATE 35: CPT | Performed by: PHYSICIAN ASSISTANT

## 2020-02-08 PROCEDURE — 84145 PROCALCITONIN (PCT): CPT | Performed by: PHYSICIAN ASSISTANT

## 2020-02-08 PROCEDURE — ND001 PR NO DOCUMENTATION: Performed by: PHYSICIAN ASSISTANT

## 2020-02-08 RX ORDER — METHYLPREDNISOLONE SODIUM SUCCINATE 40 MG/ML
40 INJECTION, POWDER, LYOPHILIZED, FOR SOLUTION INTRAMUSCULAR; INTRAVENOUS EVERY 12 HOURS SCHEDULED
Status: DISCONTINUED | OUTPATIENT
Start: 2020-02-08 | End: 2020-02-09 | Stop reason: HOSPADM

## 2020-02-08 RX ADMIN — METOPROLOL TARTRATE 25 MG: 25 TABLET ORAL at 08:49

## 2020-02-08 RX ADMIN — METHYLPREDNISOLONE SODIUM SUCCINATE 40 MG: 40 INJECTION, POWDER, FOR SOLUTION INTRAMUSCULAR; INTRAVENOUS at 23:24

## 2020-02-08 RX ADMIN — IPRATROPIUM BROMIDE AND ALBUTEROL SULFATE 3 ML: 2.5; .5 SOLUTION RESPIRATORY (INHALATION) at 20:15

## 2020-02-08 RX ADMIN — BENZONATATE 200 MG: 100 CAPSULE ORAL at 15:41

## 2020-02-08 RX ADMIN — OSELTAMIVIR PHOSPHATE 30 MG: 30 CAPSULE ORAL at 08:48

## 2020-02-08 RX ADMIN — IPRATROPIUM BROMIDE AND ALBUTEROL SULFATE 3 ML: 2.5; .5 SOLUTION RESPIRATORY (INHALATION) at 02:25

## 2020-02-08 RX ADMIN — GUAIFENESIN 1200 MG: 600 TABLET ORAL at 08:49

## 2020-02-08 RX ADMIN — METHYLPREDNISOLONE SODIUM SUCCINATE 40 MG: 40 INJECTION, POWDER, FOR SOLUTION INTRAMUSCULAR; INTRAVENOUS at 11:22

## 2020-02-08 RX ADMIN — ENOXAPARIN SODIUM 40 MG: 40 INJECTION SUBCUTANEOUS at 08:48

## 2020-02-08 RX ADMIN — METHYLPREDNISOLONE SODIUM SUCCINATE 40 MG: 40 INJECTION, POWDER, FOR SOLUTION INTRAMUSCULAR; INTRAVENOUS at 02:57

## 2020-02-08 RX ADMIN — BENZONATATE 200 MG: 100 CAPSULE ORAL at 21:27

## 2020-02-08 RX ADMIN — HYDROCHLOROTHIAZIDE 12.5 MG: 12.5 TABLET ORAL at 08:49

## 2020-02-08 RX ADMIN — LEVOTHYROXINE SODIUM 100 MCG: 100 TABLET ORAL at 05:01

## 2020-02-08 RX ADMIN — IPRATROPIUM BROMIDE AND ALBUTEROL SULFATE 3 ML: 2.5; .5 SOLUTION RESPIRATORY (INHALATION) at 07:56

## 2020-02-08 RX ADMIN — AZITHROMYCIN 500 MG: 250 TABLET, FILM COATED ORAL at 14:40

## 2020-02-08 RX ADMIN — GUAIFENESIN 1200 MG: 600 TABLET ORAL at 21:27

## 2020-02-08 RX ADMIN — IPRATROPIUM BROMIDE AND ALBUTEROL SULFATE 3 ML: 2.5; .5 SOLUTION RESPIRATORY (INHALATION) at 13:07

## 2020-02-08 RX ADMIN — BENZONATATE 200 MG: 100 CAPSULE ORAL at 08:49

## 2020-02-08 RX ADMIN — OSELTAMIVIR PHOSPHATE 30 MG: 30 CAPSULE ORAL at 21:27

## 2020-02-08 RX ADMIN — METOPROLOL TARTRATE 25 MG: 25 TABLET ORAL at 19:44

## 2020-02-08 RX ADMIN — LISINOPRIL 10 MG: 10 TABLET ORAL at 08:49

## 2020-02-08 NOTE — PROGRESS NOTES
Progress Note - Janina Gave 1955, 71 y o  male MRN: [id number]    Unit/Bed#: -01 Encounter: [id number]    Primary Care Provider: Omar Palmer MD   Date and time admitted to hospital: 2/6/2020 11:34 AM    * Acute bronchitis with bronchospasm  Assessment & Plan  · Presented with wheezing, shortness of breath - also component of likely asthma versus COPD  Likely in setting of influenza  · Recommend formal studies with Pulmonology outpatient - patient previously has not yet completed PFTs/spirometry  · Previously has only been on albuterol PRN  · CXR: no acute processes noted  · Received zithromax on admission - will continue for total of 3 days  · Received loading dose steroids in ED  Increased solu-medrol yesterday to q 8 hours - wheezing much improved on exam   Will decrease Solu-Medrol back to 40 mg BID  · Increased mucinex to 1200 mg BID  · Continue duo-neb q 6 hours  · Scheduled tessalon perles  · Respiratory protocol  · Initiated Breo daily    Acute respiratory failure with hypoxia (HCC)resolved as of 2/8/2020  Assessment & Plan  · Likely due to URI, bronchospasm in setting of suspected asthma vs COPD  · Required 2 L nasal cannula  · SpO2 currently 94% on RA - resolved    Leukocytosis  Assessment & Plan  · Leukocytosis of 20 K  · Likely in setting of influenza, steroids  · Afebrile  Monitor fever curve, wbc's    Influenza A  Assessment & Plan  · + Influenza A  · Continue droplet precautions  · Initiated on tamiflu given multiple comorbidities x 5 days    Stage 3 chronic kidney disease (HCC)  Assessment & Plan  · Baseline Cr appears to be 1 6-1 8  · Creatinine today 1 69 - stable  · Continue to monitor  Mild obstructive sleep apnea  Assessment & Plan  · Continue CPAP  Other specified hypothyroidism  Assessment & Plan  · Continue levothyroxine    Essential hypertension  Assessment & Plan  · Blood pressure stable    · Continue lisinopril hydrochlorothiazide and metoprolol      VTE Pharmacologic Prophylaxis:   Pharmacologic: Enoxaparin (Lovenox)  Mechanical VTE Prophylaxis in Place: Yes    Patient Centered Rounds: I have performed bedside rounds with nursing staff today  Discussions with Specialists or Other Care Team Provider:  Nursing, attending    Education and Discussions with Family / Patient:  Discussed with patient directly at bedside  Time Spent for Care: 30 minutes  More than 50% of total time spent on counseling and coordination of care as described above  Current Length of Stay: 2 day(s)    Current Patient Status: Inpatient   Certification Statement: The patient will continue to require additional inpatient hospital stay due to Schedule nebulizer treatments, monitoring of respiratory status as above    Discharge Plan:  Likely discharge within 24 hours pending further clinical improvement and taper of steroids    Code Status: Level 1 - Full Code      Subjective:   "I feel better"    Patient states that he is noted no more wheezing and his cough is much improved  He does not feel nearly short of breath as he did on admission  Denies chest pain/palpitations, nausea/vomiting, abdominal pain  Objective:     Vitals:   Temp (24hrs), Av 2 °F (36 8 °C), Min:98 1 °F (36 7 °C), Max:98 2 °F (36 8 °C)    Temp:  [98 1 °F (36 7 °C)-98 2 °F (36 8 °C)] 98 2 °F (36 8 °C)  HR:  [67-75] 75  Resp:  [20-21] 21  BP: (130-154)/(74-97) 154/97  SpO2:  [90 %-97 %] 93 %  Body mass index is 38 09 kg/m²  Input and Output Summary (last 24 hours):     No intake or output data in the 24 hours ending 20 1349    Physical Exam:     Physical Exam   Constitutional: He is oriented to person, place, and time  Vital signs are normal  He appears well-developed  Appears comfortable, no acute distress   HENT:   Head: Normocephalic  Eyes: Pupils are equal, round, and reactive to light  Conjunctivae and EOM are normal  No scleral icterus  Neck: Normal range of motion     Cardiovascular: Normal rate, regular rhythm and normal heart sounds  No murmur heard  Pulmonary/Chest: Effort normal and breath sounds normal  No respiratory distress  He has no wheezes  He has no rhonchi  He has no rales  Abdominal: Soft  Bowel sounds are normal  There is no tenderness  There is no rigidity, no rebound and no guarding  Musculoskeletal: He exhibits no edema, tenderness or deformity  Able to ambulate without difficulty   Neurological: He is alert and oriented to person, place, and time  Skin: Skin is warm and dry  Psychiatric: He has a normal mood and affect  His speech is normal and behavior is normal    Nursing note and vitals reviewed  Additional Data:     Labs:    Results from last 7 days   Lab Units 02/08/20  0417   WBC Thousand/uL 20 24*   HEMOGLOBIN g/dL 14 2   HEMATOCRIT % 45 5   PLATELETS Thousands/uL 209   NEUTROS PCT % 87*   LYMPHS PCT % 2*   MONOS PCT % 10   EOS PCT % 0     Results from last 7 days   Lab Units 02/08/20  0417   SODIUM mmol/L 143   POTASSIUM mmol/L 4 4   CHLORIDE mmol/L 106   CO2 mmol/L 30   BUN mg/dL 32*   CREATININE mg/dL 1 69*   ANION GAP mmol/L 7   CALCIUM mg/dL 8 5   GLUCOSE RANDOM mg/dL 142*         Results from last 7 days   Lab Units 02/07/20  1128 02/07/20  0731   POC GLUCOSE mg/dl 195* 143*         Results from last 7 days   Lab Units 02/08/20  0417 02/07/20  0610   PROCALCITONIN ng/ml 0 26* 0 41*           * I Have Reviewed All Lab Data Listed Above  * Additional Pertinent Lab Tests Reviewed:  All Labs Within Last 24 Hours Reviewed    Imaging:    Imaging Reports Reviewed Today Include: CXR  Imaging Personally Reviewed by Myself Includes:      Recent Cultures (last 7 days):           Last 24 Hours Medication List:     Current Facility-Administered Medications:  acetaminophen 650 mg Oral Q6H PRN Priscella Ores V, PA-C   albuterol 2 5 mg Nebulization Q4H PRN Ruth , DO   azithromycin 500 mg Oral Q24H NELLIE Corea-C   benzonatate 200 mg Oral TID Asim Keita Keshawn PLAZA PA-C   enoxaparin 40 mg Subcutaneous Daily Yesica PLAZA PA-C   fluticasone-vilanterol 1 puff Inhalation Daily Yesica LPAZA PA-C   guaiFENesin 1,200 mg Oral Q12H [address] Tracy Zelaya PA-C   hydrochlorothiazide 12 5 mg Oral Daily Yesica PLAZA PA-C   ipratropium-albuterol 3 mL Nebulization Q6H Alyson Paz DO   levothyroxine 100 mcg Oral Early Morning Yesica PLAZA PA-C   lisinopril 10 mg Oral Daily Yesica PLAZA PA-C   methylPREDNISolone sodium succinate 40 mg Intravenous Q12H [address] Osman Melvin PA-C   metoprolol tartrate 25 mg Oral BID Yesica PLAZA PA-C   oseltamivir 30 mg Oral Q12H [address] Tracy Zelaya PA-C        Today, Patient Was Seen By: Tracy Zelaya PA-C    ** Please Note: Dictation voice to text software may have been used in the creation of this document   **

## 2020-02-08 NOTE — ASSESSMENT & PLAN NOTE
· Leukocytosis of 20 K  · Likely in setting of influenza, steroids  · Afebrile    Monitor fever curve, wbc's

## 2020-02-08 NOTE — ASSESSMENT & PLAN NOTE
· + Influenza A  · Continue droplet precautions  · Initiated on tamiflu given multiple comorbidities x 5 days

## 2020-02-09 VITALS
TEMPERATURE: 97.7 F | DIASTOLIC BLOOD PRESSURE: 95 MMHG | BODY MASS INDEX: 38.09 KG/M2 | OXYGEN SATURATION: 93 % | HEART RATE: 77 BPM | WEIGHT: 273.1 LBS | SYSTOLIC BLOOD PRESSURE: 162 MMHG | RESPIRATION RATE: 20 BRPM

## 2020-02-09 PROBLEM — J20.9 ACUTE BRONCHITIS WITH BRONCHOSPASM: Status: RESOLVED | Noted: 2020-02-06 | Resolved: 2020-02-09

## 2020-02-09 LAB
ANION GAP SERPL CALCULATED.3IONS-SCNC: 7 MMOL/L (ref 4–13)
BASOPHILS # BLD MANUAL: 0 THOUSAND/UL (ref 0–0.1)
BASOPHILS NFR MAR MANUAL: 0 % (ref 0–1)
BUN SERPL-MCNC: 31 MG/DL (ref 5–25)
CALCIUM SERPL-MCNC: 8 MG/DL (ref 8.3–10.1)
CHLORIDE SERPL-SCNC: 105 MMOL/L (ref 100–108)
CO2 SERPL-SCNC: 28 MMOL/L (ref 21–32)
CREAT SERPL-MCNC: 1.62 MG/DL (ref 0.6–1.3)
EOSINOPHIL # BLD MANUAL: 0 THOUSAND/UL (ref 0–0.4)
EOSINOPHIL NFR BLD MANUAL: 0 % (ref 0–6)
ERYTHROCYTE [DISTWIDTH] IN BLOOD BY AUTOMATED COUNT: 13.7 % (ref 11.6–15.1)
GFR SERPL CREATININE-BSD FRML MDRD: 44 ML/MIN/1.73SQ M
GLUCOSE SERPL-MCNC: 188 MG/DL (ref 65–140)
HCT VFR BLD AUTO: 45.4 % (ref 36.5–49.3)
HGB BLD-MCNC: 14 G/DL (ref 12–17)
LYMPHOCYTES # BLD AUTO: 0.14 THOUSAND/UL (ref 0.6–4.47)
LYMPHOCYTES # BLD AUTO: 1 % (ref 14–44)
MCH RBC QN AUTO: 29 PG (ref 26.8–34.3)
MCHC RBC AUTO-ENTMCNC: 30.8 G/DL (ref 31.4–37.4)
MCV RBC AUTO: 94 FL (ref 82–98)
METAMYELOCYTES NFR BLD MANUAL: 2 % (ref 0–1)
MONOCYTES # BLD AUTO: 0.57 THOUSAND/UL (ref 0–1.22)
MONOCYTES NFR BLD: 4 % (ref 4–12)
NEUTROPHILS # BLD MANUAL: 13.17 THOUSAND/UL (ref 1.85–7.62)
NEUTS BAND NFR BLD MANUAL: 3 % (ref 0–8)
NEUTS SEG NFR BLD AUTO: 89 % (ref 43–75)
NRBC BLD AUTO-RTO: 0 /100 WBCS
PLATELET # BLD AUTO: 210 THOUSANDS/UL (ref 149–390)
PLATELET BLD QL SMEAR: ADEQUATE
PMV BLD AUTO: 12.2 FL (ref 8.9–12.7)
POTASSIUM SERPL-SCNC: 4.3 MMOL/L (ref 3.5–5.3)
POTASSIUM SERPL-SCNC: 5.7 MMOL/L (ref 3.5–5.3)
RBC # BLD AUTO: 4.82 MILLION/UL (ref 3.88–5.62)
RBC MORPH BLD: NORMAL
SODIUM SERPL-SCNC: 140 MMOL/L (ref 136–145)
TOTAL CELLS COUNTED SPEC: 100
VARIANT LYMPHS # BLD AUTO: 1 %
WBC # BLD AUTO: 14.32 THOUSAND/UL (ref 4.31–10.16)

## 2020-02-09 PROCEDURE — 94760 N-INVAS EAR/PLS OXIMETRY 1: CPT

## 2020-02-09 PROCEDURE — 85027 COMPLETE CBC AUTOMATED: CPT | Performed by: PHYSICIAN ASSISTANT

## 2020-02-09 PROCEDURE — 84132 ASSAY OF SERUM POTASSIUM: CPT | Performed by: PHYSICIAN ASSISTANT

## 2020-02-09 PROCEDURE — 99239 HOSP IP/OBS DSCHRG MGMT >30: CPT | Performed by: PHYSICIAN ASSISTANT

## 2020-02-09 PROCEDURE — 80048 BASIC METABOLIC PNL TOTAL CA: CPT | Performed by: PHYSICIAN ASSISTANT

## 2020-02-09 PROCEDURE — 94640 AIRWAY INHALATION TREATMENT: CPT

## 2020-02-09 PROCEDURE — 85007 BL SMEAR W/DIFF WBC COUNT: CPT | Performed by: PHYSICIAN ASSISTANT

## 2020-02-09 PROCEDURE — 94761 N-INVAS EAR/PLS OXIMETRY MLT: CPT

## 2020-02-09 RX ORDER — IPRATROPIUM BROMIDE AND ALBUTEROL SULFATE 2.5; .5 MG/3ML; MG/3ML
3 SOLUTION RESPIRATORY (INHALATION) EVERY 6 HOURS PRN
Qty: 30 VIAL | Refills: 0 | Status: SHIPPED | OUTPATIENT
Start: 2020-02-09 | End: 2020-04-08 | Stop reason: SDUPTHER

## 2020-02-09 RX ORDER — OSELTAMIVIR PHOSPHATE 30 MG/1
30 CAPSULE ORAL EVERY 12 HOURS SCHEDULED
Qty: 4 CAPSULE | Refills: 0 | Status: SHIPPED | OUTPATIENT
Start: 2020-02-10 | End: 2020-02-12

## 2020-02-09 RX ORDER — GUAIFENESIN 1200 MG/1
1200 TABLET, EXTENDED RELEASE ORAL EVERY 12 HOURS SCHEDULED
Refills: 0
Start: 2020-02-09 | End: 2020-02-14

## 2020-02-09 RX ORDER — PREDNISONE 10 MG/1
TABLET ORAL
Qty: 26 TABLET | Refills: 0 | Status: SHIPPED | OUTPATIENT
Start: 2020-02-09 | End: 2020-02-20

## 2020-02-09 RX ORDER — FLUTICASONE FUROATE AND VILANTEROL 100; 25 UG/1; UG/1
1 POWDER RESPIRATORY (INHALATION) DAILY
Qty: 60 EACH | Refills: 0 | Status: SHIPPED | OUTPATIENT
Start: 2020-02-09 | End: 2020-02-09 | Stop reason: HOSPADM

## 2020-02-09 RX ORDER — BENZONATATE 200 MG/1
200 CAPSULE ORAL 3 TIMES DAILY PRN
Qty: 20 CAPSULE | Refills: 0
Start: 2020-02-09 | End: 2020-04-08 | Stop reason: ALTCHOICE

## 2020-02-09 RX ADMIN — IPRATROPIUM BROMIDE AND ALBUTEROL SULFATE 3 ML: 2.5; .5 SOLUTION RESPIRATORY (INHALATION) at 00:00

## 2020-02-09 RX ADMIN — IPRATROPIUM BROMIDE AND ALBUTEROL SULFATE 3 ML: 2.5; .5 SOLUTION RESPIRATORY (INHALATION) at 07:19

## 2020-02-09 RX ADMIN — ENOXAPARIN SODIUM 40 MG: 40 INJECTION SUBCUTANEOUS at 08:30

## 2020-02-09 RX ADMIN — METHYLPREDNISOLONE SODIUM SUCCINATE 40 MG: 40 INJECTION, POWDER, FOR SOLUTION INTRAMUSCULAR; INTRAVENOUS at 10:54

## 2020-02-09 RX ADMIN — FLUTICASONE FUROATE AND VILANTEROL TRIFENATATE 1 PUFF: 100; 25 POWDER RESPIRATORY (INHALATION) at 07:20

## 2020-02-09 RX ADMIN — METOPROLOL TARTRATE 25 MG: 25 TABLET ORAL at 18:43

## 2020-02-09 RX ADMIN — BENZONATATE 200 MG: 100 CAPSULE ORAL at 18:28

## 2020-02-09 RX ADMIN — OSELTAMIVIR PHOSPHATE 30 MG: 30 CAPSULE ORAL at 20:10

## 2020-02-09 RX ADMIN — GUAIFENESIN 1200 MG: 600 TABLET ORAL at 08:30

## 2020-02-09 RX ADMIN — LEVOTHYROXINE SODIUM 100 MCG: 100 TABLET ORAL at 05:35

## 2020-02-09 RX ADMIN — LISINOPRIL 10 MG: 10 TABLET ORAL at 08:30

## 2020-02-09 RX ADMIN — METOPROLOL TARTRATE 25 MG: 25 TABLET ORAL at 08:30

## 2020-02-09 RX ADMIN — OSELTAMIVIR PHOSPHATE 30 MG: 30 CAPSULE ORAL at 08:30

## 2020-02-09 RX ADMIN — IPRATROPIUM BROMIDE AND ALBUTEROL SULFATE 3 ML: 2.5; .5 SOLUTION RESPIRATORY (INHALATION) at 13:40

## 2020-02-09 RX ADMIN — GUAIFENESIN 1200 MG: 600 TABLET ORAL at 20:16

## 2020-02-09 RX ADMIN — BENZONATATE 200 MG: 100 CAPSULE ORAL at 08:30

## 2020-02-09 RX ADMIN — HYDROCHLOROTHIAZIDE 12.5 MG: 12.5 TABLET ORAL at 08:30

## 2020-02-09 NOTE — DISCHARGE INSTRUCTIONS
Influenza   AMBULATORY CARE:   Influenza (the flu) is an infection caused by the influenza virus  The flu is easily spread when an infected person coughs, sneezes, or has close contact with others  You may be able to spread the flu to others for 1 week or longer after signs or symptoms appear  Common signs and symptoms include the following:   · Fever and chills     · Headaches, body aches, and muscle or joint pain     · Cough, runny nose, and sore throat     · Loss of appetite, nausea, vomiting, or diarrhea     · Tiredness     · Trouble breathing  Call 911 for any of the following:   · You have trouble breathing, and your lips look purple or blue      · You have a seizure  Seek care immediately if:   · You are dizzy, or you are urinating less or not at all       · You have a headache with a stiff neck, and you feel tired or confused      · You have new pain or pressure in your chest      · Your symptoms, such as shortness of breath, vomiting, or diarrhea, get worse       · Your symptoms, such as fever and coughing, seem to get better, but then get worse  Contact your healthcare provider if:   · You have new muscle pain or weakness      · You have questions or concerns about your condition or care  Treatment for influenza may include any of the following:  · Acetaminophen decreases pain and fever  It is available without a doctor's order  Ask how much to take and how often to take it  Follow directions  Acetaminophen can cause liver damage if not taken correctly      · NSAIDs , such as ibuprofen, do not use in setting of your kidney disease      · Antivirals help fight a viral infection  Manage your symptoms:   · Rest as much as you can to help you recover      · Drink liquids as directed to help prevent dehydration  Ask how much liquid to drink each day and which liquids are best for you  Prevent the spread of the flu:   · Wash your hands often  Use soap and water   Wash your hands after you use the bathroom, change a child's diapers, or sneeze  Wash your hands before you prepare or eat food  Use gel hand cleanser when soap and water are not available  Do not touch your eyes, nose, or mouth unless you have washed your hands first              · Cover your mouth when you sneeze or cough  Cough into a tissue or the bend of your arm      · Clean shared items with a germ-killing   Clean table surfaces, doorknobs, and light switches  Do not share towels, silverware, and dishes with people who are sick  Wash bed sheets, towels, silverware, and dishes with soap and water       · Wear a mask over your mouth and nose if you are sick or are near anyone who is sick       · Stay away from others if you are sick       · Influenza vaccine helps prevent influenza (flu)  Everyone older than 6 months should get a yearly influenza vaccine  Get the vaccine as soon as it is available, usually in September or October each year  Follow up with your healthcare provider as directed: Write down your questions so you remember to ask them during your visits  © 2017 [employer]  Information is for End User's use only and may not be sold, redistributed or otherwise used for commercial purposes  All illustrations and images included in CareNotes® are the copyrighted property of [employer] , [employer]  or Marty Whitfield  The above information is an  only  It is not intended as medical advice for individual conditions or treatments   Talk to your doctor, nurse or pharmacist before following any medical regimen to see if it is safe and effective for you

## 2020-02-09 NOTE — ASSESSMENT & PLAN NOTE
· Presented with wheezing, shortness of breath - also component of likely asthma versus COPD  Likely in setting of influenza  · Recommend formal studies with Pulmonology outpatient - patient previously has not yet completed PFTs/spirometry  · Previously has only been on albuterol PRN  · CXR: no acute processes noted  · Received zithromax on admission - will continue for total of 3 days  · Received loading dose steroids in ED  Increased solu-medrol to q 8 hours - wheezing much improved on exam and yesterday was transitioned to 40 mg b i d  Will discharge home on a prednisone taper  · Continue Mucinex 1200 mg b i d  X5 days  · Patient already has nebulizer at home    Continue DuoNebs q 6 hours as needed  · Continue Tessalon Perles as needed

## 2020-02-09 NOTE — ASSESSMENT & PLAN NOTE
· Leukocytosis of 20 K improved to 14 K today  · Likely in setting of influenza, steroids  · Afebrile

## 2020-02-09 NOTE — ASSESSMENT & PLAN NOTE
· + Influenza A  · Continue supportive care  · Initiated on tamiflu given multiple comorbidities x 5 days

## 2020-02-09 NOTE — DISCHARGE SUMMARY
Discharge- [facility] 1955, 71 y o  male MRN: [id number]    Unit/Bed#: -01 Encounter: [id number]    Primary Care Provider: Aguila Ramachandran MD   Date and time admitted to hospital: 2/6/2020 11:34 AM    Acute respiratory failure with hypoxia (HCC)resolved as of 2/8/2020  Assessment & Plan  · Likely due to URI, bronchospasm in setting of suspected asthma vs COPD  · Required 2 L nasal cannula  · SpO2 currently 94% on RA - resolved  * Acute bronchitis with bronchospasmresolved as of 2/9/2020  Assessment & Plan  · Presented with wheezing, shortness of breath - also component of likely asthma versus COPD  Likely in setting of influenza  · Recommend formal studies with Pulmonology outpatient - patient previously has not yet completed PFTs/spirometry  · Previously has only been on albuterol PRN  · CXR: no acute processes noted  · Received zithromax on admission - will continue for total of 3 days  · Received loading dose steroids in ED  Increased solu-medrol to q 8 hours - wheezing much improved on exam and yesterday was transitioned to 40 mg b i d  Will discharge home on a prednisone taper  · Continue Mucinex 1200 mg b i d  X5 days  · Patient already has nebulizer at home  Continue DuoNebs q 6 hours as needed  · Continue Tessalon Perles as needed    Leukocytosis  Assessment & Plan  · Leukocytosis of 20 K improved to 14 K today  · Likely in setting of influenza, steroids  · Afebrile  Influenza A  Assessment & Plan  · + Influenza A  · Continue supportive care  · Initiated on tamiflu given multiple comorbidities x 5 days  Stage 3 chronic kidney disease (HCC)  Assessment & Plan  · Baseline Cr appears to be 1 6-1 8  · Creatinine today 1 62 - stable  · Continue to monitor    Mild obstructive sleep apnea  Assessment & Plan  · Continue CPAP    Other specified hypothyroidism  Assessment & Plan  · Continue levothyroxine  Essential hypertension  Assessment & Plan  · Blood pressure stable    · Continue lisinopril hydrochlorothiazide and metoprolol  Discharging Physician / Practitioner: Casa Rodriguez PA-C  PCP: Loreta Norman MD  Admission Date:   Admission Orders (From admission, onward)     Ordered        02/06/20 1440  Inpatient Admission (expected length of stay for this patient Order details is greater than two midnights)  Once                   Discharge Date: 02/09/20    Resolved Problems  Date Reviewed: 2/9/2020          Resolved    * (Principal) Acute bronchitis with bronchospasm 2/9/2020     Resolved by  Casa Rodriguez PA-C    Acute respiratory failure with hypoxia ([address] ) 2/8/2020     Resolved by  Casa Rodriguez PA-C          Consultations During Hospital Stay:  · None    Procedures Performed:   · None    Significant Findings / Test Results:   · XR chest:  No acute cardiopulmonary disease  · Influenza a positive  · Creatinine on admission 1 72  · Troponin normal  · BNP minimally elevated at 326    Incidental Findings:   · As above     Test Results Pending at Discharge (will require follow up): · None     Outpatient Tests Requested:  · Pulmonology follow-up in regards to PFTs    Complications:  Uncomplicated hospital course    Reason for Admission:  Acute bronchitis with bronchospasm    Hospital Course:     Yolanda Whitt is a 71 y o  male patient who originally presented to the hospital on 2/6/2020 due to shortness of breath  Past medical history significant for essential hypertension, chronic kidney disease, hypothyroidism, HUNG, possible asthma versus chronic obstructive pulmonary disease  Patient presented to the emergency department 02/06/2020 with shortness of breath for several days  Patient has no official diagnosis of chronic obstructive pulmonary disease or asthma  Patient received loading dose Solu-Medrol and put on scheduled breathing treatments in combination with Mucinex and Tessalon Perles    Patient was reassessed the next day and tested for influenza which came back positive for influenza a  Patient also with acute respiratory failure with hypoxia in setting of influenza, procalcitonin negative  Patient was started on Tamiflu given multiple comorbidities renally dosed  Patient's Solu-Medrol was initially increased with rapid improvement the following day  Patient was also weaned off of oxygen with stable SpO2 on room air  On day of discharge patient symptomatically was much improved and medically stable, discharged home on prednisone taper and to continue nebulizer treatments  Patient verbalized understanding for requested outpatient follow-up  Please see above list of diagnoses and related plan for additional information  Condition at Discharge: stable     Discharge Day Visit / Exam:     Subjective:  "I feel better "  Vitals: Blood Pressure: (!) 171/99 (02/09/20 0834)  Pulse: 78 (02/09/20 0834)  Temperature: 98 1 °F (36 7 °C) (02/09/20 0819)  Temp Source: Oral (02/07/20 0731)  Respirations: 18 (02/09/20 0819)  Weight - Scale: 124 kg (273 lb 1 6 oz) (02/06/20 1139)  SpO2: 90 % (02/09/20 0834)  Exam:   Physical Exam   Constitutional: He is oriented to person, place, and time  Vital signs are normal  He appears well-developed  Appears comfortable, no acute distress   HENT:   Head: Normocephalic  Eyes: Pupils are equal, round, and reactive to light  Conjunctivae and EOM are normal  No scleral icterus  Neck: Normal range of motion  Cardiovascular: Normal rate, regular rhythm and normal heart sounds  No murmur heard  Pulmonary/Chest: Effort normal and breath sounds normal  No respiratory distress  He has no wheezes  He has no rhonchi  He has no rales  Abdominal: Soft  Bowel sounds are normal  There is no tenderness  There is no rigidity, no rebound and no guarding  Musculoskeletal: He exhibits no edema, tenderness or deformity  Able to ambulate without difficulty   Neurological: He is alert and oriented to person, place, and time     Skin: Skin is warm and dry  Psychiatric: He has a normal mood and affect  His speech is normal and behavior is normal    Nursing note and vitals reviewed  Discussion with Family: Discussed with patient directly at bedside  Discharge instructions/Information to patient and family:   See after visit summary for information provided to patient and family  Provisions for Follow-Up Care:  See after visit summary for information related to follow-up care and any pertinent home health orders  Disposition:     Home    For Discharges to [facility] SNF:   · Not Applicable to this Patient - Not Applicable to this Patient    Planned Readmission: None     Discharge Statement:  I spent 32 minutes discharging the patient  This time was spent on the day of discharge  I had direct contact with the patient on the day of discharge  Greater than 50% of the total time was spent examining patient, answering all patient questions, arranging and discussing plan of care with patient as well as directly providing post-discharge instructions  Additional time then spent on discharge activities  Discharge Medications:  See after visit summary for reconciled discharge medications provided to patient and family        ** Please Note: This note has been constructed using a voice recognition system **

## 2020-02-09 NOTE — DISCHARGE INSTR - AVS FIRST PAGE
· Follow-up with your PCP within 1 week  · Establish with pulmonology as an outpatient for further testing regarding possible asthma diagnosis  · Continue Tamiflu for an additional 2 days  Continue prednisone taper over the course of the next 11 days  Make sure to take prednisone with food as this can be irritable to the stomach  · Continue nebulizer treatments as needed as well as Tessalon Perles for cough  Continue Mucinex 1200 mg twice daily for the next several days, then can use as needed  Tessalon Perles and Mucinex are available as over-the-counter medications  Return to the emergency department for further evaluation should you begin to experience high fevers, chest pain/palpitations, worsening shortness of breath, nausea/vomiting, abdominal pain

## 2020-02-09 NOTE — RESPIRATORY THERAPY NOTE
Home Oxygen Qualifying Test       Patient name: Sami oK        : 1955   Date of Test:  2020  Diagnosis:      Home Oxygen Test:    Medicare Guidelines require item(s) 1-5 on all ambulatory patients or 1 and 2 on non-ambulatory patients  1   Baseline SPO2 on Room Air at rest 96 %  2   SPO2 during exercise on Room Air 91-96 %  During exercise monitor SpO2  If SPO2 increases >=89% with ambulation do not add supplemental             oxygen  If <= 88% on room air add O2 via NC and titrate patient  Patient must be ambulated with O2 and titrated to > 88% with exertion  3   SPO2 on Oxygen at rest  %  lpm     4   SPO2 during exercise on Oxygen  % a liter flow of  lpm     5   Exercise performed:          duration 6 (min)          []  Supplemental Home Oxygen is indicated  [x]  Client does not qualify for home oxygen        Respiratory Additional Notes-     Robert Forrest, RT

## 2020-02-10 NOTE — NURSING NOTE
D/C instructions and prescriptions reviewed extensively with patient  Patient concerned about cost of meds and insurance coverage  Reiterated to patient case managements note about re:[employer] program and showed patient the brochure  Patient verbalized understanding  All PO medications administered to patient that he would require for tonight prior to d/c  Patient states he will  his prescriptions from [facility] in the morning  First dose of Prednisone due 2/10/20 per Doc ELENI Chavez and patient made aware  Patient drove himself home and stated he felt comfortable doing so  All belongings sent with patient

## 2020-02-11 ENCOUNTER — TRANSITIONAL CARE MANAGEMENT (OUTPATIENT)
Dept: FAMILY MEDICINE CLINIC | Facility: HOSPITAL | Age: 65
End: 2020-02-11

## 2020-02-12 NOTE — PROGRESS NOTES
PT will call if he can get the $25 00 for self pay appt and then schedule an appt with Dr Eduardo Johnson

## 2020-02-14 DIAGNOSIS — I10 ESSENTIAL HYPERTENSION: ICD-10-CM

## 2020-03-31 ENCOUNTER — OFFICE VISIT (OUTPATIENT)
Dept: URGENT CARE | Facility: CLINIC | Age: 65
End: 2020-03-31
Payer: OTHER GOVERNMENT

## 2020-03-31 VITALS
HEART RATE: 86 BPM | WEIGHT: 282 LBS | RESPIRATION RATE: 17 BRPM | SYSTOLIC BLOOD PRESSURE: 160 MMHG | TEMPERATURE: 98.5 F | DIASTOLIC BLOOD PRESSURE: 125 MMHG | OXYGEN SATURATION: 96 % | HEIGHT: 71 IN | BODY MASS INDEX: 39.48 KG/M2

## 2020-03-31 DIAGNOSIS — R05.9 COUGH: ICD-10-CM

## 2020-03-31 DIAGNOSIS — R05.9 COUGH: Primary | ICD-10-CM

## 2020-03-31 PROCEDURE — 87635 SARS-COV-2 COVID-19 AMP PRB: CPT

## 2020-03-31 NOTE — PATIENT INSTRUCTIONS
F/u here as needed  COVID testing at [facility] to ER if worse symptoms  Inhaler as needed  Self quarantine until results

## 2020-03-31 NOTE — PROGRESS NOTES
NAME: Mikael Erazo is a 71 y o  male  : 1955    MRN: [id number]      Assessment and Plan   Cough [R05]  1  Cough  MISC COVID-19 TEST- Collection at   [facility]           Patient Instructions   Patient Instructions   F/u here as needed  COVID testing at [facility] to ER if worse symptoms  Inhaler as needed  Self quarantine until results  Proceed to ER if symptoms worsen  Chief Complaint     Chief Complaint   Patient presents with    Sore Throat     3 days    Cough     rare, started this morning         History of Present Illness   Here c/o cough and sore throat  Sick for 2-3 days  Lives in [facility]  No recent travel or exposure to COVID  No fevers  Review of Systems   Review of Systems   Constitutional: Negative for fatigue and fever  HENT: Positive for sore throat  Negative for ear pain and trouble swallowing  Eyes: Negative for visual disturbance  Respiratory: Positive for cough  Negative for chest tightness and shortness of breath  Cardiovascular: Negative for chest pain  Gastrointestinal: Negative for abdominal pain, diarrhea, nausea and vomiting  Genitourinary: Negative for difficulty urinating and dysuria  Skin: Negative for rash and wound  Neurological: Negative for weakness and headaches           Current Medications       Current Outpatient Medications:     albuterol (2 5 mg/3 mL) 0 083 % nebulizer solution, Take 1 vial (2 5 mg total) by nebulization every 6 (six) hours as needed for wheezing or shortness of breath, Disp: 30 vial, Rfl: 5    ipratropium-albuterol (DUO-NEB) 0 5-2 5 mg/3 mL nebulizer solution, Take 1 vial (3 mL total) by nebulization every 6 (six) hours as needed for wheezing or shortness of breath, Disp: 30 vial, Rfl: 0    levothyroxine 100 mcg tablet, take 1 tablet by mouth daily, Disp: 30 tablet, Rfl: 5    lisinopril-hydrochlorothiazide (PRINZIDE,ZESTORETIC) 10-12 5 MG per tablet, take 1 tablet by mouth daily, Disp: 30 tablet, Rfl: 5    metoprolol tartrate (LOPRESSOR) 25 mg tablet, take 1 tablet by mouth twice a day, Disp: 60 tablet, Rfl: 5    benzonatate (TESSALON) 200 MG capsule, Take 1 capsule (200 mg total) by mouth 3 (three) times a day as needed for cough (Patient not taking: Reported on 3/31/2020), Disp: 20 capsule, Rfl: 0    Current Allergies     Allergies as of 03/31/2020 - Reviewed 03/31/2020   Allergen Reaction Noted    Metoclopramide Anxiety 09/25/2016              Past Medical History:   Diagnosis Date    Cellulitis, trunk     Disease of thyroid gland     Gastroenteritis and colitis, viral     last assessed: 11/24/2015    Hernia     Hypertension     Lung mass     Renal mass        Past Surgical History:   Procedure Laterality Date    ABDOMINAL ADHESION SURGERY N/A 1/13/2016    Procedure: LYSIS ADHESIONS;  Surgeon: Zari Osorio MD;  [facility];  Service:     INGUINAL HERNIA REPAIR      LYMPH NODE DISSECTION N/A 1/13/2016    Procedure: DISSECTION/STAGING LYMPH NODE PELVIS/ABDOMEN;  Surgeon: Zari Osorio MD;  [facility];  Service:     NEPHRECTOMY Right     patients believes it was the Right kidney    [place] LAP,PARTIAL NEPHRECTOMY Right 1/13/2016    Procedure: NEPHRECTOMY PARTIAL LAPAROSCOPIC W ROBOTICS, intra op ultrasound,laparoscopic lysis of adhesions;  Surgeon: Zari Osorio MD;  [facility];  Service: Urology       Family History   Problem Relation Age of Onset    Lung cancer Father     Melanoma Father     Other Father         renal insufficiency    Dementia Mother     Mental illness Neg Hx     Substance Abuse Neg Hx          Medications have been verified      The following portions of the patient's history were reviewed and updated as appropriate: allergies, current medications, past family history, past medical history, past social history, past surgical history and problem list     Objective   BP (!) 160/125   Pulse 86   Temp 98 5 °F (36 9 °C)   Resp 17   Ht 5' 11" (1 803 m)   Wt 128 kg (282 lb)   SpO2 96%   BMI 39 33 kg/m²      Physical Exam     Physical Exam   Constitutional: He is oriented to person, place, and time  He appears well-developed and well-nourished  HENT:   Head: Normocephalic  Eyes: EOM are normal    Neck: Normal range of motion  Cardiovascular: Normal rate, regular rhythm and normal heart sounds  Exam reveals no gallop and no friction rub  No murmur heard  Pulmonary/Chest: Effort normal and breath sounds normal  No respiratory distress  He has no wheezes  He has no rales  Abdominal: Soft  Bowel sounds are normal  He exhibits no distension  There is no tenderness  There is no rebound and no guarding  Musculoskeletal: Normal range of motion  Neurological: He is oriented to person, place, and time  Skin: Skin is warm and dry  No rash noted  Psychiatric: He has a normal mood and affect  Thought content normal    Nursing note and vitals reviewed

## 2020-04-03 ENCOUNTER — TELEPHONE (OUTPATIENT)
Dept: URGENT CARE | Facility: CLINIC | Age: 65
End: 2020-04-03

## 2020-04-03 LAB — SARS-COV-2 RNA SPEC QL NAA+PROBE: NOT DETECTED

## 2020-04-07 PROBLEM — J10.1 INFLUENZA A: Status: RESOLVED | Noted: 2020-02-08 | Resolved: 2020-04-07

## 2020-04-07 PROBLEM — D72.829 LEUKOCYTOSIS: Status: RESOLVED | Noted: 2020-02-08 | Resolved: 2020-04-07

## 2020-04-08 ENCOUNTER — OFFICE VISIT (OUTPATIENT)
Dept: FAMILY MEDICINE CLINIC | Facility: HOSPITAL | Age: 65
End: 2020-04-08
Payer: MEDICARE

## 2020-04-08 VITALS — HEIGHT: 71 IN | TEMPERATURE: 98.7 F | WEIGHT: 280 LBS | BODY MASS INDEX: 39.2 KG/M2

## 2020-04-08 DIAGNOSIS — J01.00 ACUTE MAXILLARY SINUSITIS, RECURRENCE NOT SPECIFIED: Primary | ICD-10-CM

## 2020-04-08 DIAGNOSIS — J20.9 ACUTE BRONCHITIS WITH BRONCHOSPASM: ICD-10-CM

## 2020-04-08 PROCEDURE — 99214 OFFICE O/P EST MOD 30 MIN: CPT | Performed by: INTERNAL MEDICINE

## 2020-04-08 RX ORDER — IPRATROPIUM BROMIDE AND ALBUTEROL SULFATE 2.5; .5 MG/3ML; MG/3ML
3 SOLUTION RESPIRATORY (INHALATION) EVERY 8 HOURS PRN
Qty: 90 VIAL | Refills: 3 | Status: SHIPPED | OUTPATIENT
Start: 2020-04-08 | End: 2020-12-10 | Stop reason: SDUPTHER

## 2020-04-08 RX ORDER — AMOXICILLIN 875 MG/1
875 TABLET, COATED ORAL 2 TIMES DAILY
Qty: 14 TABLET | Refills: 0 | Status: SHIPPED | OUTPATIENT
Start: 2020-04-08 | End: 2020-04-15 | Stop reason: ALTCHOICE

## 2020-04-14 ENCOUNTER — TELEPHONE (OUTPATIENT)
Dept: FAMILY MEDICINE CLINIC | Facility: HOSPITAL | Age: 65
End: 2020-04-14

## 2020-04-15 ENCOUNTER — OFFICE VISIT (OUTPATIENT)
Dept: FAMILY MEDICINE CLINIC | Facility: HOSPITAL | Age: 65
End: 2020-04-15
Payer: MEDICARE

## 2020-04-15 VITALS
WEIGHT: 277 LBS | DIASTOLIC BLOOD PRESSURE: 90 MMHG | HEART RATE: 72 BPM | SYSTOLIC BLOOD PRESSURE: 130 MMHG | RESPIRATION RATE: 20 BRPM | BODY MASS INDEX: 38.78 KG/M2 | HEIGHT: 71 IN | TEMPERATURE: 98.8 F

## 2020-04-15 DIAGNOSIS — E66.9 OBESITY (BMI 35.0-39.9 WITHOUT COMORBIDITY): ICD-10-CM

## 2020-04-15 DIAGNOSIS — N18.30 STAGE 3 CHRONIC KIDNEY DISEASE (HCC): ICD-10-CM

## 2020-04-15 DIAGNOSIS — R06.00 DYSPNEA, UNSPECIFIED TYPE: ICD-10-CM

## 2020-04-15 DIAGNOSIS — B37.0 CANDIDIASIS OF MOUTH: Primary | ICD-10-CM

## 2020-04-15 DIAGNOSIS — I10 ESSENTIAL HYPERTENSION: ICD-10-CM

## 2020-04-15 DIAGNOSIS — M75.81 TENDINITIS OF RIGHT ROTATOR CUFF: ICD-10-CM

## 2020-04-15 PROCEDURE — 3008F BODY MASS INDEX DOCD: CPT | Performed by: INTERNAL MEDICINE

## 2020-04-15 PROCEDURE — 3075F SYST BP GE 130 - 139MM HG: CPT | Performed by: INTERNAL MEDICINE

## 2020-04-15 PROCEDURE — 3080F DIAST BP >= 90 MM HG: CPT | Performed by: INTERNAL MEDICINE

## 2020-04-15 PROCEDURE — 99214 OFFICE O/P EST MOD 30 MIN: CPT | Performed by: INTERNAL MEDICINE

## 2020-04-15 PROCEDURE — 1036F TOBACCO NON-USER: CPT | Performed by: INTERNAL MEDICINE

## 2020-04-15 RX ORDER — ALBUTEROL SULFATE 90 UG/1
2 AEROSOL, METERED RESPIRATORY (INHALATION) EVERY 6 HOURS PRN
Qty: 1 INHALER | Refills: 2 | Status: SHIPPED | OUTPATIENT
Start: 2020-04-15 | End: 2020-05-15

## 2020-06-01 ENCOUNTER — TELEPHONE (OUTPATIENT)
Dept: FAMILY MEDICINE CLINIC | Facility: HOSPITAL | Age: 65
End: 2020-06-01

## 2020-07-13 DIAGNOSIS — E03.8 OTHER SPECIFIED HYPOTHYROIDISM: ICD-10-CM

## 2020-07-13 DIAGNOSIS — I10 ESSENTIAL HYPERTENSION: ICD-10-CM

## 2020-07-13 RX ORDER — LISINOPRIL AND HYDROCHLOROTHIAZIDE 12.5; 1 MG/1; MG/1
TABLET ORAL
Qty: 90 TABLET | Refills: 3 | Status: SHIPPED | OUTPATIENT
Start: 2020-07-13 | End: 2020-12-10 | Stop reason: ALTCHOICE

## 2020-07-13 RX ORDER — LEVOTHYROXINE SODIUM 0.1 MG/1
TABLET ORAL
Qty: 90 TABLET | Refills: 3 | Status: SHIPPED | OUTPATIENT
Start: 2020-07-13 | End: 2021-01-13 | Stop reason: SDUPTHER

## 2020-08-26 ENCOUNTER — OFFICE VISIT (OUTPATIENT)
Dept: FAMILY MEDICINE CLINIC | Facility: HOSPITAL | Age: 65
End: 2020-08-26
Payer: COMMERCIAL

## 2020-08-26 VITALS
HEART RATE: 80 BPM | SYSTOLIC BLOOD PRESSURE: 118 MMHG | RESPIRATION RATE: 20 BRPM | WEIGHT: 277 LBS | TEMPERATURE: 99.2 F | DIASTOLIC BLOOD PRESSURE: 66 MMHG | HEIGHT: 71 IN | BODY MASS INDEX: 38.78 KG/M2

## 2020-08-26 DIAGNOSIS — Z13.6 SCREENING FOR AAA (ABDOMINAL AORTIC ANEURYSM): ICD-10-CM

## 2020-08-26 DIAGNOSIS — Z13.6 SCREENING FOR CARDIOVASCULAR CONDITION: ICD-10-CM

## 2020-08-26 DIAGNOSIS — Z12.11 SCREENING FOR COLORECTAL CANCER: ICD-10-CM

## 2020-08-26 DIAGNOSIS — Z13.1 SCREENING FOR DIABETES MELLITUS: ICD-10-CM

## 2020-08-26 DIAGNOSIS — Z12.12 SCREENING FOR COLORECTAL CANCER: ICD-10-CM

## 2020-08-26 DIAGNOSIS — Z00.00 WELCOME TO MEDICARE PREVENTIVE VISIT: Primary | ICD-10-CM

## 2020-08-26 DIAGNOSIS — Z12.5 SCREENING FOR PROSTATE CANCER: ICD-10-CM

## 2020-08-26 DIAGNOSIS — Z23 ENCOUNTER FOR IMMUNIZATION: ICD-10-CM

## 2020-08-26 DIAGNOSIS — Z23 NEED FOR PNEUMOCOCCAL VACCINATION: ICD-10-CM

## 2020-08-26 DIAGNOSIS — Z12.2 ENCOUNTER FOR SCREENING FOR LUNG CANCER: ICD-10-CM

## 2020-08-26 DIAGNOSIS — Z11.59 NEED FOR HEPATITIS C SCREENING TEST: ICD-10-CM

## 2020-08-26 PROCEDURE — G0009 ADMIN PNEUMOCOCCAL VACCINE: HCPCS

## 2020-08-26 PROCEDURE — 3008F BODY MASS INDEX DOCD: CPT | Performed by: INTERNAL MEDICINE

## 2020-08-26 PROCEDURE — 90670 PCV13 VACCINE IM: CPT

## 2020-08-26 PROCEDURE — 3074F SYST BP LT 130 MM HG: CPT | Performed by: INTERNAL MEDICINE

## 2020-08-26 PROCEDURE — 3078F DIAST BP <80 MM HG: CPT | Performed by: INTERNAL MEDICINE

## 2020-08-26 PROCEDURE — G0402 INITIAL PREVENTIVE EXAM: HCPCS | Performed by: INTERNAL MEDICINE

## 2020-08-26 RX ORDER — ZOSTER VACCINE RECOMBINANT, ADJUVANTED 50 MCG/0.5
KIT INTRAMUSCULAR
Qty: 1 EACH | Refills: 1 | Status: SHIPPED | OUTPATIENT
Start: 2020-08-26 | End: 2020-12-10 | Stop reason: ALTCHOICE

## 2020-08-26 NOTE — PROGRESS NOTES
Assessment and Plan:     Problem List Items Addressed This Visit     None      Visit Diagnoses     Welcome to Medicare preventive visit    -  Primary    Screening for diabetes mellitus        Relevant Orders    Comprehensive metabolic panel    Screening for cardiovascular condition        Relevant Orders    Lipid panel    Need for hepatitis C screening test        Relevant Orders    Hepatitis C antibody    Screening for colorectal cancer        Relevant Orders    Ambulatory referral to Gastroenterology    Encounter for screening for lung cancer        Relevant Orders    CT lung screening program    Screening for prostate cancer        Relevant Orders    PSA, Total Screen    Encounter for immunization        Relevant Medications    Zoster Vac Recomb Adjuvanted (Shingrix) 48 MCG/0 5ML SUSR    Screening for AAA (abdominal aortic aneurysm)        Relevant Orders    US abdominal aorta screening aaa           Preventive health issues were discussed with patient, and age appropriate screening tests were ordered as noted in patient's After Visit Summary  Personalized health advice and appropriate referrals for health education or preventive services given if needed, as noted in patient's After Visit Summary  History of Present Illness:     Patient presents for Welcome to Medicare visit  Patient Care Team:  Vale Andre MD as PCP - Luke Vázquez MD as PCP - [address] [place] [address] (E)  MD Tamia Darby MD     Review of Systems:     Review of Systems   Constitutional: Positive for fatigue  Negative for fever  HENT: Negative for congestion  Eyes: Negative for visual disturbance  Respiratory: Positive for shortness of breath (on exertion, chronic)  Negative for cough  Cardiovascular: Negative for chest pain, palpitations and leg swelling  Gastrointestinal: Negative for abdominal pain, blood in stool and diarrhea  Endocrine: Negative for polydipsia and polyphagia  Genitourinary: Negative for difficulty urinating and dysuria  Musculoskeletal: Negative for joint swelling, myalgias and neck stiffness  Skin: Negative for rash  Neurological: Negative for weakness, numbness and headaches  Hematological: Negative for adenopathy  Psychiatric/Behavioral: Negative for dysphoric mood  All other systems reviewed and are negative       Problem List:     Patient Active Problem List   Diagnosis    Essential hypertension    Other specified hypothyroidism    Lung nodule, solitary    Mild obstructive sleep apnea    Stage 3 chronic kidney disease (HCC)    Tendinitis of right rotator cuff      Past Medical and Surgical History:     Past Medical History:   Diagnosis Date    Cellulitis, trunk     Disease of thyroid gland     Gastroenteritis and colitis, viral     last assessed: 11/24/2015    Hernia     Hypertension     Lung mass     Renal mass      Past Surgical History:   Procedure Laterality Date    ABDOMINAL ADHESION SURGERY N/A 1/13/2016    Procedure: LYSIS ADHESIONS;  Surgeon: Dg Ritchie MD;  [facility];  Service:     INGUINAL HERNIA REPAIR      LYMPH NODE DISSECTION N/A 1/13/2016    Procedure: DISSECTION/STAGING LYMPH NODE PELVIS/ABDOMEN;  Surgeon: Dg Ritchie MD;  [facility];  Service:     NEPHRECTOMY Right     patients believes it was the Right kidney    [place] LAP,PARTIAL NEPHRECTOMY Right 1/13/2016    Procedure: NEPHRECTOMY PARTIAL LAPAROSCOPIC W ROBOTICS, intra op ultrasound,laparoscopic lysis of adhesions;  Surgeon: Dg Ritchie MD;  [facility];  Service: Urology      Family History:     Family History   Problem Relation Age of Onset    Lung cancer Father     Melanoma Father     Other Father         renal insufficiency    Dementia Mother     Mental illness Neg Hx     Substance Abuse Neg Hx       Social History:     E-Cigarette/Vaping    E-Cigarette Use Former User      E-Cigarette/Vaping Substances    Nicotine No  THC No     CBD No     Flavoring No     Other No     Unknown No      Social History     Socioeconomic History    Marital status:      Spouse name: None    Number of children: None    Years of education: None    Highest education level: None   Occupational History    None   Social Needs    Financial resource strain: Not hard at all   Addison-Angella insecurity     Worry: Never true     Inability: Never true   mytrax needs     Medical: No     Non-medical: No   Tobacco Use    Smoking status: Former Smoker     Packs/day: 1 00     Years: 30 00     Pack years: 30 00     Last attempt to quit: 8/26/2010     Years since quitting: 10 0    Smokeless tobacco: Former User    Tobacco comment: not in last 12 months  quit smoking arnd 6/2013, used to smoke about 1 pack per week   Substance and Sexual Activity    Alcohol use: Yes     Frequency: Monthly or less     Comment: social  ocasional    Drug use: No    Sexual activity: None   Lifestyle    Physical activity     Days per week: 0 days     Minutes per session: 0 min    Stress: Very much   Relationships    Social connections     Talks on phone: None     Gets together: None     Attends [employer] service: None     Active member of club or organization: None     Attends meetings of clubs or organizations: None     Relationship status: None    Intimate partner violence     Fear of current or ex partner: No     Emotionally abused: No     Physically abused: No     Forced sexual activity: No   Other Topics Concern    None   Social History Narrative    Caffeine use- drinks at [employer] thermos daily    Dental care, regularly    Exercise:  Yard work    Supportive and Safe    No advance directives    Spouse/Family Support      Medications and Allergies:     Current Outpatient Medications   Medication Sig Dispense Refill    ipratropium-albuterol (DUO-NEB) 0 5-2 5 mg/3 mL nebulizer solution Take 1 vial (3 mL total) by nebulization every 8 (eight) hours as needed for wheezing or shortness of breath 90 vial 3    levothyroxine 100 mcg tablet take 1 tablet by mouth once daily 90 tablet 3    lisinopril-hydrochlorothiazide (PRINZIDE,ZESTORETIC) 10-12 5 MG per tablet take 1 tablet by mouth once daily 90 tablet 3    metoprolol tartrate (LOPRESSOR) 25 mg tablet TAKE 1 TABLET BY MOUTH 2 TIMES A  tablet 3    Zoster Vac Recomb Adjuvanted (Shingrix) 50 MCG/0 5ML SUSR 0 5mL IM for one dose, followed by 0 5mL IM 2-6 months after first dose 1 each 1     No current facility-administered medications for this visit  Allergies   Allergen Reactions    Metoclopramide Anxiety      Immunizations: There is no immunization history on file for this patient  Health Maintenance:         Topic Date Due    Hepatitis C Screening  1955         Topic Date Due    DTaP,Tdap,and Td Vaccines (1 - Tdap) 10/18/1975    Influenza Vaccine  07/01/2020      Medicare Screening Tests and Risk Assessments:     Debi Quinn is here for his Welcome to Medicare visit  Health Risk Assessment:   Patient rates overall health as fair  Patient feels that their physical health rating is same  Eyesight was rated as same  Hearing was rated as same  Patient feels that their emotional and mental health rating is slightly worse  Pain experienced in the last 7 days has been none  Patient states that he has experienced weight loss or gain in last 6 months  Depression Screening:   PHQ-2 Score: 2      Fall Risk Screening: In the past year, patient has experienced: no history of falling in past year      Home Safety:  Patient does not have trouble with stairs inside or outside of their home  Patient has working smoke alarms and has working carbon monoxide detector  Home safety hazards include: none  Nutrition:   Current diet is Low Cholesterol, Regular, Low Saturated Fat, Low Carb and Limited junk food  Medications:   Patient is not currently taking any over-the-counter supplements   Patient is able to manage medications  Activities of Daily Living (ADLs)/Instrumental Activities of Daily Living (IADLs):   Walk and transfer into and out of bed and chair?: Yes  Dress and groom yourself?: Yes    Bathe or shower yourself?: Yes    Feed yourself? Yes  Do your laundry/housekeeping?: Yes  Manage your money, pay your bills and track your expenses?: Yes  Make your own meals?: Yes    Do your own shopping?: Yes    ADL comments: Daughter lives with pt---she helps pt with shopping, meals, etc    Previous Hospitalizations:   Any hospitalizations or ED visits within the last 12 months?: Yes    How many hospitalizations have you had in the last year?: 1-2    Hospitalization Comments: Was in [place] [facility] [place] [facility]        Advance Care Planning:     Advanced directive counseling given: Yes    Five wishes given: Yes    End of Life Decisions reviewed with patient: Yes      Cognitive Screening:   Provider or family/friend/caregiver concerned regarding cognition?: No    PREVENTIVE SCREENINGS      Cardiovascular Screening:    General: Screening Current and Risks and Benefits Discussed    Due for: Lipid Panel      Diabetes Screening:     General: Screening Current and Risks and Benefits Discussed    Due for: Blood Glucose      Colorectal Cancer Screening:     General: Screening Current and Risks and Benefits Discussed    Due for: Colonoscopy - Low Risk      Prostate Cancer Screening:    General: Risks and Benefits Discussed    Due for: PSA      Osteoporosis Screening:    General: Screening Not Indicated      Abdominal Aortic Aneurysm (AAA) Screening:    Risk factors include: age between 73-67 yo and tobacco use        Lung Cancer Screening:     General: Screening Not Indicated and Risks and Benefits Discussed    Due for: Low Dose CT (LDCT)      Hepatitis C Screening:    General: Risks and Benefits Discussed and History Hepatitis C    Hep C Screening Accepted: Yes      Other Counseling Topics:   Regular weightbearing exercise  Visual Acuity Screening    Right eye Left eye Both eyes   Without correction: 20/30 20/20 20/15   With correction:           Physical Exam:     /66   Pulse 80   Temp 99 2 °F (37 3 °C) (Tympanic)   Resp 20   Ht 5' 11" (1 803 m)   Wt 126 kg (277 lb)   BMI 38 63 kg/m²     Physical Exam  Constitutional:       Appearance: He is well-developed  HENT:      Head: Normocephalic  Right Ear: Tympanic membrane normal       Left Ear: Tympanic membrane normal    Eyes:      Conjunctiva/sclera: Conjunctivae normal    Neck:      Musculoskeletal: Neck supple  Cardiovascular:      Rate and Rhythm: Normal rate and regular rhythm  Heart sounds: No murmur  Pulmonary:      Effort: No respiratory distress  Breath sounds: No wheezing or rales  Abdominal:      Tenderness: There is no abdominal tenderness  Comments: Reducible umbilical hernia     Musculoskeletal:         General: No tenderness  Lymphadenopathy:      Cervical: No cervical adenopathy  Skin:     General: Skin is warm and dry  Findings: No erythema  Neurological:      Mental Status: He is alert and oriented to person, place, and time  Cranial Nerves: No cranial nerve deficit            Sharlene Tellez MD

## 2020-08-26 NOTE — PATIENT INSTRUCTIONS
Medicare Preventive Visit Patient Instructions  Thank you for completing your Welcome to Medicare Visit or Medicare Annual Wellness Visit today  Your next wellness visit will be due in one year (8/26/2021)  The screening/preventive services that you may require over the next 5-10 years are detailed below  Some tests may not apply to you based off risk factors and/or age  Screening tests ordered at today's visit but not completed yet may show as past due  Also, please note that scanned in results may not display below  Preventive Screenings:  Service Recommendations Previous Testing/Comments   Colorectal Cancer Screening  · Colonoscopy    · Fecal Occult Blood Test (FOBT)/Fecal Immunochemical Test (FIT)  · Fecal DNA/Cologuard Test  · Flexible Sigmoidoscopy Age: 54-65 years old   Colonoscopy: every 10 years (May be performed more frequently if at higher risk)  OR  FOBT/FIT: every 1 year  OR  Cologuard: every 3 years  OR  Sigmoidoscopy: every 5 years  Screening may be recommended earlier than age 48 if at higher risk for colorectal cancer  Also, an individualized decision between you and your healthcare provider will decide whether screening between the ages of 74-80 would be appropriate   Colonoscopy: Not on file  FOBT/FIT: 12/11/2019  Cologuard: Not on file  Sigmoidoscopy: Not on file    Screening Current     Prostate Cancer Screening Individualized decision between patient and health care provider in men between ages of 53-78   Medicare will cover every 12 months beginning on the day after your 50th birthday PSA: No results in last 5 years          Hepatitis C Screening Once for adults born between [birth year] and 1965  More frequently in patients at high risk for Hepatitis C Hep C Antibody: Not on file       Diabetes Screening 1-2 times per year if you're at risk for diabetes or have pre-diabetes Fasting glucose: No results in last 5 years   A1C: No results in last 5 years    Screening Current   Cholesterol Screening Once every 5 years if you don't have a lipid disorder  May order more often based on risk factors  Lipid panel: 01/06/2016    Screening Current      Other Preventive Screenings Covered by Medicare:  1  Abdominal Aortic Aneurysm (AAA) Screening: covered once if your at risk  You're considered to be at risk if you have a family history of AAA or a male between the age of 73-68 who smoking at least 100 cigarettes in your lifetime  2  Lung Cancer Screening: covers low dose CT scan once per year if you meet all of the following conditions: (1) Age 50-69; (2) No signs or symptoms of lung cancer; (3) Current smoker or have quit smoking within the last 15 years; (4) You have a tobacco smoking history of at least 30 pack years (packs per day x number of years you smoked); (5) You get a written order from a healthcare provider  3  Glaucoma Screening: covered annually if you're considered high risk: (1) You have diabetes OR (2) Family history of glaucoma OR (3)  aged 48 and older OR (3)  American aged 72 and older  3  Osteoporosis Screening: covered every 2 years if you meet one of the following conditions: (1) Have a vertebral abnormality; (2) On glucocorticoid therapy for more than 3 months; (3) Have primary hyperparathyroidism; (4) On osteoporosis medications and need to assess response to drug therapy  5  HIV Screening: covered annually if you're between the age of 12-76  Also covered annually if you are younger than 13 and older than 72 with risk factors for HIV infection  For pregnant patients, it is covered up to 3 times per pregnancy      Immunizations:  Immunization Recommendations   Influenza Vaccine Annual influenza vaccination during flu season is recommended for all persons aged >= 6 months who do not have contraindications   Pneumococcal Vaccine (Prevnar and Pneumovax)  * Prevnar = PCV13  * Pneumovax = PPSV23 Adults 25-60 years old: 1-3 doses may be recommended based on certain risk factors  Adults 72 years old: Prevnar (PCV13) vaccine recommended followed by Pneumovax (PPSV23) vaccine  If already received PPSV23 since turning 65, then PCV13 recommended at least one year after PPSV23 dose  Hepatitis B Vaccine 3 dose series if at intermediate or high risk (ex: diabetes, end stage renal disease, liver disease)   Tetanus (Td) Vaccine - COST NOT COVERED BY MEDICARE PART B Following completion of primary series, a booster dose should be given every 10 years to maintain immunity against tetanus  Td may also be given as tetanus wound prophylaxis  Tdap Vaccine - COST NOT COVERED BY MEDICARE PART B Recommended at least once for all adults  For pregnant patients, recommended with each pregnancy  Shingles Vaccine (Shingrix) - COST NOT COVERED BY MEDICARE PART B  2 shot series recommended in those aged 48 and above     Health Maintenance Due:      Topic Date Due    Hepatitis C Screening  1955     Immunizations Due:      Topic Date Due    DTaP,Tdap,and Td Vaccines (1 - Tdap) 10/18/1975    Influenza Vaccine  07/01/2020     Advance Directives   What are advance directives? Advance directives are legal documents that state your wishes and plans for medical care  These plans are made ahead of time in case you lose your ability to make decisions for yourself  Advance directives can apply to any medical decision, such as the treatments you want, and if you want to donate organs  What are the types of advance directives? There are many types of advance directives, and each state has rules about how to use them  You may choose a combination of any of the following:  · Living will: This is a written record of the treatment you want  You can also choose which treatments you do not want, which to limit, and which to stop at a certain time  This includes surgery, medicine, IV fluid, and tube feedings  · Durable power of  for healthcare [facility]):   This is a written record that states who you want to make healthcare choices for you when you are unable to make them for yourself  This person, called a proxy, is usually a family member or a friend  You may choose more than 1 proxy  · Do not resuscitate (DNR) order:  A DNR order is used in case your heart stops beating or you stop breathing  It is a request not to have certain forms of treatment, such as CPR  A DNR order may be included in other types of advance directives  · Medical directive: This covers the care that you want if you are in a coma, near death, or unable to make decisions for yourself  You can list the treatments you want for each condition  Treatment may include pain medicine, surgery, blood transfusions, dialysis, IV or tube feedings, and a ventilator (breathing machine)  · Values history: This document has questions about your views, beliefs, and how you feel and think about life  This information can help others choose the care that you would choose  Why are advance directives important? An advance directive helps you control your care  Although spoken wishes may be used, it is better to have your wishes written down  Spoken wishes can be misunderstood, or not followed  Treatments may be given even if you do not want them  An advance directive may make it easier for your family to make difficult choices about your care  Weight Management   Why it is important to manage your weight:  Being overweight increases your risk of health conditions such as heart disease, high blood pressure, type 2 diabetes, and certain types of cancer  It can also increase your risk for osteoarthritis, sleep apnea, and other respiratory problems  Aim for a slow, steady weight loss  Even a small amount of weight loss can lower your risk of health problems  How to lose weight safely:  A safe and healthy way to lose weight is to eat fewer calories and get regular exercise   You can lose up about 1 pound a week by decreasing the number of calories you eat by 500 calories each day  Healthy meal plan for weight management:  A healthy meal plan includes a variety of foods, contains fewer calories, and helps you stay healthy  A healthy meal plan includes the following:  · Eat whole-grain foods more often  A healthy meal plan should contain fiber  Fiber is the part of grains, fruits, and vegetables that is not broken down by your body  Whole-grain foods are healthy and provide extra fiber in your diet  Some examples of whole-grain foods are whole-wheat breads and pastas, oatmeal, brown rice, and bulgur  · Eat a variety of vegetables every day  Include dark, leafy greens such as spinach, kale, fabiana greens, and mustard greens  Eat yellow and orange vegetables such as carrots, sweet potatoes, and winter squash  · Eat a variety of fruits every day  Choose fresh or canned fruit (canned in its own juice or light syrup) instead of juice  Fruit juice has very little or no fiber  · Eat low-fat dairy foods  Drink fat-free (skim) milk or 1% milk  Eat fat-free yogurt and low-fat cottage cheese  Try low-fat cheeses such as mozzarella and other reduced-fat cheeses  · Choose meat and other protein foods that are low in fat  Choose beans or other legumes such as split peas or lentils  Choose fish, skinless poultry (chicken or turkey), or lean cuts of red meat (beef or pork)  Before you cook meat or poultry, cut off any visible fat  · Use less fat and oil  Try baking foods instead of frying them  Add less fat, such as margarine, sour cream, regular salad dressing and mayonnaise to foods  Eat fewer high-fat foods  Some examples of high-fat foods include french fries, doughnuts, ice cream, and cakes  · Eat fewer sweets  Limit foods and drinks that are high in sugar  This includes candy, cookies, regular soda, and sweetened drinks  Exercise:  Exercise at least 30 minutes per day on most days of the week   Some examples of exercise include walking, biking, dancing, and swimming  You can also fit in more physical activity by taking the stairs instead of the elevator or parking farther away from stores  Ask your healthcare provider about the best exercise plan for you  © Copyright [employer] 2018 Information is for End User's use only and may not be sold, redistributed or otherwise used for commercial purposes  All illustrations and images included in CareNotes® are the copyrighted property of [employer]  or [facility] Preventive Visit Patient Instructions  Thank you for completing your Welcome to Medicare Visit or Medicare Annual Wellness Visit today  Your next wellness visit will be due in one year (8/26/2021)  The screening/preventive services that you may require over the next 5-10 years are detailed below  Some tests may not apply to you based off risk factors and/or age  Screening tests ordered at today's visit but not completed yet may show as past due  Also, please note that scanned in results may not display below  Preventive Screenings:  Service Recommendations Previous Testing/Comments   Colorectal Cancer Screening  · Colonoscopy    · Fecal Occult Blood Test (FOBT)/Fecal Immunochemical Test (FIT)  · Fecal DNA/Cologuard Test  · Flexible Sigmoidoscopy Age: 54-65 years old   Colonoscopy: every 10 years (May be performed more frequently if at higher risk)  OR  FOBT/FIT: every 1 year  OR  Cologuard: every 3 years  OR  Sigmoidoscopy: every 5 years  Screening may be recommended earlier than age 48 if at higher risk for colorectal cancer  Also, an individualized decision between you and your healthcare provider will decide whether screening between the ages of 74-80 would be appropriate   Colonoscopy: Not on file  FOBT/FIT: 12/11/2019  Cologuard: Not on file  Sigmoidoscopy: Not on file    Screening Current     Prostate Cancer Screening Individualized decision between patient and health care provider in men between ages of 53-78   Medicare will cover every 12 months beginning on the day after your 50th birthday PSA: No results in last 5 years          Hepatitis C Screening Once for adults born between [facility]  More frequently in patients at high risk for Hepatitis C Hep C Antibody: Not on file       Diabetes Screening 1-2 times per year if you're at risk for diabetes or have pre-diabetes Fasting glucose: No results in last 5 years   A1C: No results in last 5 years    Screening Current   Cholesterol Screening Once every 5 years if you don't have a lipid disorder  May order more often based on risk factors  Lipid panel: 01/06/2016    Screening Current      Other Preventive Screenings Covered by Medicare:  6  Abdominal Aortic Aneurysm (AAA) Screening: covered once if your at risk  You're considered to be at risk if you have a family history of AAA or a male between the age of 73-68 who smoking at least 100 cigarettes in your lifetime  7  Lung Cancer Screening: covers low dose CT scan once per year if you meet all of the following conditions: (1) Age 50-69; (2) No signs or symptoms of lung cancer; (3) Current smoker or have quit smoking within the last 15 years; (4) You have a tobacco smoking history of at least 30 pack years (packs per day x number of years you smoked); (5) You get a written order from a healthcare provider  8  Glaucoma Screening: covered annually if you're considered high risk: (1) You have diabetes OR (2) Family history of glaucoma OR (3)  aged 48 and older OR (3)  American aged 72 and older  5  Osteoporosis Screening: covered every 2 years if you meet one of the following conditions: (1) Have a vertebral abnormality; (2) On glucocorticoid therapy for more than 3 months; (3) Have primary hyperparathyroidism; (4) On osteoporosis medications and need to assess response to drug therapy  10  HIV Screening: covered annually if you're between the age of 12-76   Also covered annually if you are younger than 13 and older than 65 with risk factors for HIV infection  For pregnant patients, it is covered up to 3 times per pregnancy  Immunizations:  Immunization Recommendations   Influenza Vaccine Annual influenza vaccination during flu season is recommended for all persons aged >= 6 months who do not have contraindications   Pneumococcal Vaccine (Prevnar and Pneumovax)  * Prevnar = PCV13  * Pneumovax = PPSV23 Adults 25-60 years old: 1-3 doses may be recommended based on certain risk factors  Adults 72 years old: Prevnar (PCV13) vaccine recommended followed by Pneumovax (PPSV23) vaccine  If already received PPSV23 since turning 65, then PCV13 recommended at least one year after PPSV23 dose  Hepatitis B Vaccine 3 dose series if at intermediate or high risk (ex: diabetes, end stage renal disease, liver disease)   Tetanus (Td) Vaccine - COST NOT COVERED BY MEDICARE PART B Following completion of primary series, a booster dose should be given every 10 years to maintain immunity against tetanus  Td may also be given as tetanus wound prophylaxis  Tdap Vaccine - COST NOT COVERED BY MEDICARE PART B Recommended at least once for all adults  For pregnant patients, recommended with each pregnancy  Shingles Vaccine (Shingrix) - COST NOT COVERED BY MEDICARE PART B  2 shot series recommended in those aged 48 and above     Health Maintenance Due:      Topic Date Due    Hepatitis C Screening  1955     Immunizations Due:      Topic Date Due    DTaP,Tdap,and Td Vaccines (1 - Tdap) 10/18/1975    Influenza Vaccine  07/01/2020     Advance Directives   What are advance directives? Advance directives are legal documents that state your wishes and plans for medical care  These plans are made ahead of time in case you lose your ability to make decisions for yourself  Advance directives can apply to any medical decision, such as the treatments you want, and if you want to donate organs  What are the types of advance directives?   There are many types of advance directives, and each state has rules about how to use them  You may choose a combination of any of the following:  · Living will: This is a written record of the treatment you want  You can also choose which treatments you do not want, which to limit, and which to stop at a certain time  This includes surgery, medicine, IV fluid, and tube feedings  · Durable power of  for healthcare [facility] This is a written record that states who you want to make healthcare choices for you when you are unable to make them for yourself  This person, called a proxy, is usually a family member or a friend  You may choose more than 1 proxy  · Do not resuscitate (DNR) order:  A DNR order is used in case your heart stops beating or you stop breathing  It is a request not to have certain forms of treatment, such as CPR  A DNR order may be included in other types of advance directives  · Medical directive: This covers the care that you want if you are in a coma, near death, or unable to make decisions for yourself  You can list the treatments you want for each condition  Treatment may include pain medicine, surgery, blood transfusions, dialysis, IV or tube feedings, and a ventilator (breathing machine)  · Values history: This document has questions about your views, beliefs, and how you feel and think about life  This information can help others choose the care that you would choose  Why are advance directives important? An advance directive helps you control your care  Although spoken wishes may be used, it is better to have your wishes written down  Spoken wishes can be misunderstood, or not followed  Treatments may be given even if you do not want them  An advance directive may make it easier for your family to make difficult choices about your care     Weight Management   Why it is important to manage your weight:  Being overweight increases your risk of health conditions such as heart disease, high blood pressure, type 2 diabetes, and certain types of cancer  It can also increase your risk for osteoarthritis, sleep apnea, and other respiratory problems  Aim for a slow, steady weight loss  Even a small amount of weight loss can lower your risk of health problems  How to lose weight safely:  A safe and healthy way to lose weight is to eat fewer calories and get regular exercise  You can lose up about 1 pound a week by decreasing the number of calories you eat by 500 calories each day  Healthy meal plan for weight management:  A healthy meal plan includes a variety of foods, contains fewer calories, and helps you stay healthy  A healthy meal plan includes the following:  · Eat whole-grain foods more often  A healthy meal plan should contain fiber  Fiber is the part of grains, fruits, and vegetables that is not broken down by your body  Whole-grain foods are healthy and provide extra fiber in your diet  Some examples of whole-grain foods are whole-wheat breads and pastas, oatmeal, brown rice, and bulgur  · Eat a variety of vegetables every day  Include dark, leafy greens such as spinach, kale, fabiana greens, and mustard greens  Eat yellow and orange vegetables such as carrots, sweet potatoes, and winter squash  · Eat a variety of fruits every day  Choose fresh or canned fruit (canned in its own juice or light syrup) instead of juice  Fruit juice has very little or no fiber  · Eat low-fat dairy foods  Drink fat-free (skim) milk or 1% milk  Eat fat-free yogurt and low-fat cottage cheese  Try low-fat cheeses such as mozzarella and other reduced-fat cheeses  · Choose meat and other protein foods that are low in fat  Choose beans or other legumes such as split peas or lentils  Choose fish, skinless poultry (chicken or turkey), or lean cuts of red meat (beef or pork)  Before you cook meat or poultry, cut off any visible fat  · Use less fat and oil  Try baking foods instead of frying them   Add less fat, such as margarine, sour cream, regular salad dressing and mayonnaise to foods  Eat fewer high-fat foods  Some examples of high-fat foods include french fries, doughnuts, ice cream, and cakes  · Eat fewer sweets  Limit foods and drinks that are high in sugar  This includes candy, cookies, regular soda, and sweetened drinks  Exercise:  Exercise at least 30 minutes per day on most days of the week  Some examples of exercise include walking, biking, dancing, and swimming  You can also fit in more physical activity by taking the stairs instead of the elevator or parking farther away from stores  Ask your healthcare provider about the best exercise plan for you  © Copyright [facility] 2018 Information is for End User's use only and may not be sold, redistributed or otherwise used for commercial purposes   All illustrations and images included in CareNotes® are the copyrighted property of [employer]  or [address] [place] [address]

## 2020-09-10 NOTE — ASSESSMENT & PLAN NOTE
· Baseline Cr appears to be 1 6-1 8  · Creatinine today 1 62 - stable  · Continue to monitor Hydroquinone Counseling:  Patient advised that medication may result in skin irritation, lightening (hypopigmentation), dryness, and burning.  In the event of skin irritation, the patient was advised to reduce the amount of the drug applied or use it less frequently.  Rarely, spots that are treated with hydroquinone can become darker (pseudoochronosis).  Should this occur, patient instructed to stop medication and call the office. The patient verbalized understanding of the proper use and possible adverse effects of hydroquinone.  All of the patient's questions and concerns were addressed.

## 2020-09-22 ENCOUNTER — TELEPHONE (OUTPATIENT)
Dept: OTHER | Facility: OTHER | Age: 65
End: 2020-09-22

## 2020-09-22 NOTE — TELEPHONE ENCOUNTER
PT called in very frustrated regarding his upcoming colonoscopy appointment  He claims that he was told that after rescheduling it, the procedure was not covered by his insurance, due to it being out of network  He would like a call back in order to get this procedure rescheduled with a practice that takes his insurance  He continued to reiterate that he could not afford this, and his frustration was very evident

## 2020-09-23 ENCOUNTER — TELEPHONE (OUTPATIENT)
Dept: FAMILY MEDICINE CLINIC | Facility: HOSPITAL | Age: 65
End: 2020-09-23

## 2020-09-23 DIAGNOSIS — R06.2 WHEEZING: Primary | ICD-10-CM

## 2020-09-23 RX ORDER — ALBUTEROL SULFATE 90 UG/1
2 AEROSOL, METERED RESPIRATORY (INHALATION) EVERY 6 HOURS PRN
Qty: 1 INHALER | Refills: 2 | Status: SHIPPED | OUTPATIENT
Start: 2020-09-23 | End: 2020-11-12 | Stop reason: SDUPTHER

## 2020-09-23 NOTE — TELEPHONE ENCOUNTER
No, since he has blood in the stool the colonoscopy is the best test  I advise him to save up for the colonoscopy!

## 2020-09-23 NOTE — TELEPHONE ENCOUNTER
BACK W/ BLOOD IN THE STOOL - IT IS CONSIDERED DIAGNOSTIC AND THAT WOULD COST HIM $350 00 CO-PAY    I RELAYED THIS TO MARLENA AND HE SAID HE CAN NOT DO THIS - CAN NOT AFFORD  I STRESSED THAT THERE MIGHT BE A PROBLEM SINCE IT CAME BACK ABNORMAL  HE SAID HE CAN NOT AFFORD THE $350 00    IS THERE SOME OTHER TESTING YOU COULD SUGGEST

## 2020-09-23 NOTE — TELEPHONE ENCOUNTER
Called an spoke to flora at [facility] - they do take his insurance - I relayed this to Edmundo Spencer - he will keep the colonoscopy appt

## 2020-09-26 ENCOUNTER — NURSE TRIAGE (OUTPATIENT)
Dept: OTHER | Facility: OTHER | Age: 65
End: 2020-09-26

## 2020-09-26 ENCOUNTER — HOSPITAL ENCOUNTER (EMERGENCY)
Facility: HOSPITAL | Age: 65
Discharge: HOME | End: 2020-09-26
Attending: EMERGENCY MEDICINE | Admitting: EMERGENCY MEDICINE
Payer: COMMERCIAL

## 2020-09-26 VITALS
HEIGHT: 71 IN | BODY MASS INDEX: 39.06 KG/M2 | RESPIRATION RATE: 20 BRPM | WEIGHT: 279 LBS | SYSTOLIC BLOOD PRESSURE: 152 MMHG | HEART RATE: 73 BPM | DIASTOLIC BLOOD PRESSURE: 87 MMHG | TEMPERATURE: 97.4 F | OXYGEN SATURATION: 96 %

## 2020-09-26 DIAGNOSIS — R19.7 DIARRHEA: ICD-10-CM

## 2020-09-26 DIAGNOSIS — K92.1 BLOOD IN STOOL: Primary | ICD-10-CM

## 2020-09-26 LAB
ANION GAP SERPL CALCULATED.3IONS-SCNC: 8 MMOL/L (ref 4–13)
BASOPHILS # BLD AUTO: 0.03 THOUSANDS/ΜL (ref 0–0.1)
BASOPHILS NFR BLD AUTO: 0 % (ref 0–1)
BUN SERPL-MCNC: 19 MG/DL (ref 5–25)
CALCIUM SERPL-MCNC: 8.3 MG/DL (ref 8.3–10.1)
CHLORIDE SERPL-SCNC: 106 MMOL/L (ref 100–108)
CO2 SERPL-SCNC: 29 MMOL/L (ref 21–32)
CREAT SERPL-MCNC: 1.54 MG/DL (ref 0.6–1.3)
EOSINOPHIL # BLD AUTO: 0.24 THOUSAND/ΜL (ref 0–0.61)
EOSINOPHIL NFR BLD AUTO: 3 % (ref 0–6)
ERYTHROCYTE [DISTWIDTH] IN BLOOD BY AUTOMATED COUNT: 13.6 % (ref 11.6–15.1)
GFR SERPL CREATININE-BSD FRML MDRD: 47 ML/MIN/1.73SQ M
GLUCOSE SERPL-MCNC: 104 MG/DL (ref 65–140)
HCT VFR BLD AUTO: 45.7 % (ref 36.5–49.3)
HGB BLD-MCNC: 14.4 G/DL (ref 12–17)
IMM GRANULOCYTES # BLD AUTO: 0.08 THOUSAND/UL (ref 0–0.2)
IMM GRANULOCYTES NFR BLD AUTO: 1 % (ref 0–2)
LYMPHOCYTES # BLD AUTO: 1.29 THOUSANDS/ΜL (ref 0.6–4.47)
LYMPHOCYTES NFR BLD AUTO: 16 % (ref 14–44)
MCH RBC QN AUTO: 28.9 PG (ref 26.8–34.3)
MCHC RBC AUTO-ENTMCNC: 31.5 G/DL (ref 31.4–37.4)
MCV RBC AUTO: 92 FL (ref 82–98)
MONOCYTES # BLD AUTO: 1 THOUSAND/ΜL (ref 0.17–1.22)
MONOCYTES NFR BLD AUTO: 13 % (ref 4–12)
NEUTROPHILS # BLD AUTO: 5.31 THOUSANDS/ΜL (ref 1.85–7.62)
NEUTS SEG NFR BLD AUTO: 67 % (ref 43–75)
NRBC BLD AUTO-RTO: 0 /100 WBCS
PLATELET # BLD AUTO: 218 THOUSANDS/UL (ref 149–390)
PMV BLD AUTO: 11.5 FL (ref 8.9–12.7)
POTASSIUM SERPL-SCNC: 4.7 MMOL/L (ref 3.5–5.3)
RBC # BLD AUTO: 4.99 MILLION/UL (ref 3.88–5.62)
SODIUM SERPL-SCNC: 143 MMOL/L (ref 136–145)
WBC # BLD AUTO: 7.95 THOUSAND/UL (ref 4.31–10.16)

## 2020-09-26 PROCEDURE — 36415 COLL VENOUS BLD VENIPUNCTURE: CPT | Performed by: EMERGENCY MEDICINE

## 2020-09-26 PROCEDURE — 99284 EMERGENCY DEPT VISIT MOD MDM: CPT

## 2020-09-26 PROCEDURE — 80048 BASIC METABOLIC PNL TOTAL CA: CPT | Performed by: EMERGENCY MEDICINE

## 2020-09-26 PROCEDURE — 99284 EMERGENCY DEPT VISIT MOD MDM: CPT | Performed by: EMERGENCY MEDICINE

## 2020-09-26 PROCEDURE — 85025 COMPLETE CBC W/AUTO DIFF WBC: CPT | Performed by: EMERGENCY MEDICINE

## 2020-09-26 NOTE — TELEPHONE ENCOUNTER
Regarding: Diarrhea/Blood  ----- Message from Sarath Lopez sent at 9/26/2020  3:40 PM EDT -----  " I am having Diarrhea and it looks like blood "

## 2020-09-26 NOTE — TELEPHONE ENCOUNTER
Reason for Disposition   [1] MODERATE rectal bleeding (small blood clots, passing blood without stool, or toilet water turns red) AND [2] more than once a day    Answer Assessment - Initial Assessment Questions  1  APPEARANCE of BLOOD: "What color is it?" "Is it passed separately, on the surface of the stool, or mixed in with the stool?"       Bright red blood in the stool    2  AMOUNT: "How much blood was passed?"       Moderate amount of blood  Pt reports that the first episode turned his entire toilet bowl red and the second episode was an incontinence episode that also had a moderate amount of bright red blood  3  FREQUENCY: "How many times has blood been passed with the stools?"       Reports 2 occurrences today     4  ONSET: "When was the blood first seen in the stools?" (Days or weeks)       Today     5  DIARRHEA: "Is there also some diarrhea?" If so, ask: "How many diarrhea stools were passed in past 24 hours?"       Reports 3 episodes in the last 24 hours     6  CONSTIPATION: "Do you have constipation?" If so, "How bad is it?"      Denies     7  RECURRENT SYMPTOMS: "Have you had blood in your stools before?" If so, ask: "When was the last time?" and "What happened that time?"       Pt reports that about a year ago he sent in a hemoccult that came back positive for blood however has not had any symptoms or occurrences of blood since  He also has a colonoscopy scheduled for Oct 7     8  BLOOD THINNERS: "Do you take any blood thinners?" (e g , Coumadin/warfarin, Pradaxa/dabigatran, aspirin)      Denies     9   OTHER SYMPTOMS: "Do you have any other symptoms?"  (e g , abdominal pain, vomiting, dizziness, fever)    Denies    Protocols used: RECTAL BLEEDING-ADULT-

## 2020-10-01 ENCOUNTER — TELEMEDICINE (OUTPATIENT)
Dept: GASTROENTEROLOGY | Facility: CLINIC | Age: 65
End: 2020-10-01

## 2020-10-01 VITALS — HEIGHT: 71 IN | WEIGHT: 279 LBS | BODY MASS INDEX: 39.06 KG/M2

## 2020-10-01 DIAGNOSIS — R19.5 POSITIVE FIT (FECAL IMMUNOCHEMICAL TEST): Primary | ICD-10-CM

## 2020-10-01 DIAGNOSIS — Z12.12 SCREENING FOR COLORECTAL CANCER: ICD-10-CM

## 2020-10-01 DIAGNOSIS — Z12.11 SCREENING FOR COLORECTAL CANCER: ICD-10-CM

## 2020-10-01 RX ORDER — SODIUM, POTASSIUM,MAG SULFATES 17.5-3.13G
SOLUTION, RECONSTITUTED, ORAL ORAL
Qty: 2 BOTTLE | Refills: 0 | Status: SHIPPED | OUTPATIENT
Start: 2020-10-01 | End: 2020-10-07 | Stop reason: HOSPADM

## 2020-10-07 ENCOUNTER — TELEPHONE (OUTPATIENT)
Dept: GASTROENTEROLOGY | Facility: CLINIC | Age: 65
End: 2020-10-07

## 2020-10-07 ENCOUNTER — ANESTHESIA (OUTPATIENT)
Dept: GASTROENTEROLOGY | Facility: AMBULATORY SURGERY CENTER | Age: 65
End: 2020-10-07

## 2020-10-07 ENCOUNTER — HOSPITAL ENCOUNTER (OUTPATIENT)
Dept: GASTROENTEROLOGY | Facility: AMBULATORY SURGERY CENTER | Age: 65
Discharge: HOME | End: 2020-10-07
Payer: COMMERCIAL

## 2020-10-07 ENCOUNTER — ANESTHESIA EVENT (OUTPATIENT)
Dept: GASTROENTEROLOGY | Facility: AMBULATORY SURGERY CENTER | Age: 65
End: 2020-10-07

## 2020-10-07 VITALS
HEART RATE: 57 BPM | DIASTOLIC BLOOD PRESSURE: 58 MMHG | TEMPERATURE: 97.3 F | RESPIRATION RATE: 13 BRPM | SYSTOLIC BLOOD PRESSURE: 115 MMHG | OXYGEN SATURATION: 94 %

## 2020-10-07 VITALS — HEART RATE: 61 BPM

## 2020-10-07 DIAGNOSIS — R19.5 POSITIVE OCCULT STOOL BLOOD TEST: ICD-10-CM

## 2020-10-07 PROCEDURE — 88305 TISSUE EXAM BY PATHOLOGIST: CPT | Performed by: PATHOLOGY

## 2020-10-07 PROCEDURE — 45381 COLONOSCOPY SUBMUCOUS NJX: CPT | Performed by: INTERNAL MEDICINE

## 2020-10-07 PROCEDURE — 88342 IMHCHEM/IMCYTCHM 1ST ANTB: CPT | Performed by: PATHOLOGY

## 2020-10-07 PROCEDURE — 45385 COLONOSCOPY W/LESION REMOVAL: CPT | Performed by: INTERNAL MEDICINE

## 2020-10-07 RX ORDER — PROPOFOL 10 MG/ML
INJECTION, EMULSION INTRAVENOUS AS NEEDED
Status: DISCONTINUED | OUTPATIENT
Start: 2020-10-07 | End: 2020-10-07

## 2020-10-07 RX ORDER — SODIUM CHLORIDE 9 MG/ML
50 INJECTION, SOLUTION INTRAVENOUS CONTINUOUS
Status: DISCONTINUED | OUTPATIENT
Start: 2020-10-07 | End: 2020-10-07

## 2020-10-07 RX ORDER — SODIUM CHLORIDE 9 MG/ML
50 INJECTION, SOLUTION INTRAVENOUS CONTINUOUS
Status: DISCONTINUED | OUTPATIENT
Start: 2020-10-07 | End: 2020-10-11 | Stop reason: HOSPADM

## 2020-10-07 RX ADMIN — PROPOFOL 20 MG: 10 INJECTION, EMULSION INTRAVENOUS at 09:51

## 2020-10-07 RX ADMIN — PROPOFOL 30 MG: 10 INJECTION, EMULSION INTRAVENOUS at 09:31

## 2020-10-07 RX ADMIN — PROPOFOL 120 MG: 10 INJECTION, EMULSION INTRAVENOUS at 09:22

## 2020-10-07 RX ADMIN — SODIUM CHLORIDE: 9 INJECTION, SOLUTION INTRAVENOUS at 09:07

## 2020-10-07 RX ADMIN — PROPOFOL 30 MG: 10 INJECTION, EMULSION INTRAVENOUS at 09:38

## 2020-10-07 RX ADMIN — PROPOFOL 50 MG: 10 INJECTION, EMULSION INTRAVENOUS at 09:27

## 2020-10-07 RX ADMIN — SODIUM CHLORIDE 50 ML/HR: 9 INJECTION, SOLUTION INTRAVENOUS at 09:09

## 2020-11-12 DIAGNOSIS — R06.2 WHEEZING: ICD-10-CM

## 2020-11-12 RX ORDER — ALBUTEROL SULFATE 90 UG/1
2 AEROSOL, METERED RESPIRATORY (INHALATION) EVERY 6 HOURS PRN
Qty: 1 INHALER | Refills: 2 | Status: SHIPPED | OUTPATIENT
Start: 2020-11-12 | End: 2021-11-01 | Stop reason: SDUPTHER

## 2020-12-05 ENCOUNTER — NURSE TRIAGE (OUTPATIENT)
Dept: OTHER | Facility: OTHER | Age: 65
End: 2020-12-05

## 2020-12-05 DIAGNOSIS — Z20.828 SARS-ASSOCIATED CORONAVIRUS EXPOSURE: ICD-10-CM

## 2020-12-05 DIAGNOSIS — Z20.828 SARS-ASSOCIATED CORONAVIRUS EXPOSURE: Primary | ICD-10-CM

## 2020-12-05 PROCEDURE — U0003 INFECTIOUS AGENT DETECTION BY NUCLEIC ACID (DNA OR RNA); SEVERE ACUTE RESPIRATORY SYNDROME CORONAVIRUS 2 (SARS-COV-2) (CORONAVIRUS DISEASE [COVID-19]), AMPLIFIED PROBE TECHNIQUE, MAKING USE OF HIGH THROUGHPUT TECHNOLOGIES AS DESCRIBED BY CMS-2020-01-R: HCPCS | Performed by: INTERNAL MEDICINE

## 2020-12-08 LAB — SARS-COV-2 RNA SPEC QL NAA+PROBE: NOT DETECTED

## 2020-12-09 ENCOUNTER — LAB (OUTPATIENT)
Dept: LAB | Facility: HOSPITAL | Age: 65
End: 2020-12-09
Attending: INTERNAL MEDICINE
Payer: COMMERCIAL

## 2020-12-09 DIAGNOSIS — Z13.1 SCREENING FOR DIABETES MELLITUS: ICD-10-CM

## 2020-12-09 DIAGNOSIS — Z13.6 SCREENING FOR CARDIOVASCULAR CONDITION: ICD-10-CM

## 2020-12-09 DIAGNOSIS — Z11.59 NEED FOR HEPATITIS C SCREENING TEST: ICD-10-CM

## 2020-12-09 DIAGNOSIS — Z12.5 SCREENING FOR PROSTATE CANCER: ICD-10-CM

## 2020-12-09 LAB
ALBUMIN SERPL BCP-MCNC: 3.6 G/DL (ref 3.5–5)
ALP SERPL-CCNC: 68 U/L (ref 46–116)
ALT SERPL W P-5'-P-CCNC: 28 U/L (ref 12–78)
ANION GAP SERPL CALCULATED.3IONS-SCNC: 4 MMOL/L (ref 4–13)
AST SERPL W P-5'-P-CCNC: 10 U/L (ref 5–45)
BILIRUB SERPL-MCNC: 0.44 MG/DL (ref 0.2–1)
BUN SERPL-MCNC: 18 MG/DL (ref 5–25)
CALCIUM SERPL-MCNC: 9.2 MG/DL (ref 8.3–10.1)
CHLORIDE SERPL-SCNC: 107 MMOL/L (ref 100–108)
CHOLEST SERPL-MCNC: 175 MG/DL (ref 50–200)
CO2 SERPL-SCNC: 32 MMOL/L (ref 21–32)
CREAT SERPL-MCNC: 1.69 MG/DL (ref 0.6–1.3)
GFR SERPL CREATININE-BSD FRML MDRD: 41 ML/MIN/1.73SQ M
GLUCOSE P FAST SERPL-MCNC: 102 MG/DL (ref 65–99)
HCV AB SER QL: NORMAL
HDLC SERPL-MCNC: 34 MG/DL
LDLC SERPL CALC-MCNC: 116 MG/DL (ref 0–100)
NONHDLC SERPL-MCNC: 141 MG/DL
POTASSIUM SERPL-SCNC: 4.3 MMOL/L (ref 3.5–5.3)
PROT SERPL-MCNC: 7.3 G/DL (ref 6.4–8.2)
PSA SERPL-MCNC: 1.3 NG/ML (ref 0–4)
SODIUM SERPL-SCNC: 143 MMOL/L (ref 136–145)
TRIGL SERPL-MCNC: 127 MG/DL

## 2020-12-09 PROCEDURE — 36415 COLL VENOUS BLD VENIPUNCTURE: CPT

## 2020-12-09 PROCEDURE — 80053 COMPREHEN METABOLIC PANEL: CPT

## 2020-12-09 PROCEDURE — 86803 HEPATITIS C AB TEST: CPT

## 2020-12-09 PROCEDURE — 80061 LIPID PANEL: CPT

## 2020-12-09 PROCEDURE — G0103 PSA SCREENING: HCPCS

## 2020-12-10 ENCOUNTER — OFFICE VISIT (OUTPATIENT)
Dept: FAMILY MEDICINE CLINIC | Facility: HOSPITAL | Age: 65
End: 2020-12-10
Payer: COMMERCIAL

## 2020-12-10 VITALS
DIASTOLIC BLOOD PRESSURE: 84 MMHG | SYSTOLIC BLOOD PRESSURE: 136 MMHG | BODY MASS INDEX: 39.59 KG/M2 | RESPIRATION RATE: 16 BRPM | HEIGHT: 71 IN | WEIGHT: 282.8 LBS | HEART RATE: 76 BPM | OXYGEN SATURATION: 94 %

## 2020-12-10 DIAGNOSIS — J20.9 ACUTE BRONCHITIS WITH BRONCHOSPASM: ICD-10-CM

## 2020-12-10 DIAGNOSIS — E03.8 OTHER SPECIFIED HYPOTHYROIDISM: ICD-10-CM

## 2020-12-10 DIAGNOSIS — I10 ESSENTIAL HYPERTENSION: Primary | ICD-10-CM

## 2020-12-10 DIAGNOSIS — R06.00 DYSPNEA ON EXERTION: ICD-10-CM

## 2020-12-10 PROBLEM — N18.32 STAGE 3B CHRONIC KIDNEY DISEASE (HCC): Status: ACTIVE | Noted: 2020-02-06

## 2020-12-10 PROBLEM — R06.09 DYSPNEA ON EXERTION: Status: ACTIVE | Noted: 2020-12-10

## 2020-12-10 PROCEDURE — 99214 OFFICE O/P EST MOD 30 MIN: CPT | Performed by: INTERNAL MEDICINE

## 2020-12-10 RX ORDER — FUROSEMIDE 40 MG/1
40 TABLET ORAL EVERY MORNING
Qty: 30 TABLET | Refills: 5 | Status: SHIPPED | OUTPATIENT
Start: 2020-12-10 | End: 2021-01-13 | Stop reason: SDUPTHER

## 2020-12-10 RX ORDER — BUDESONIDE 0.5 MG/2ML
0.5 INHALANT ORAL 2 TIMES DAILY
Qty: 60 VIAL | Refills: 5 | Status: SHIPPED | OUTPATIENT
Start: 2020-12-10 | End: 2021-01-13 | Stop reason: ALTCHOICE

## 2020-12-10 RX ORDER — IPRATROPIUM BROMIDE AND ALBUTEROL SULFATE 2.5; .5 MG/3ML; MG/3ML
3 SOLUTION RESPIRATORY (INHALATION) EVERY 8 HOURS PRN
Qty: 90 VIAL | Refills: 3 | Status: SHIPPED | OUTPATIENT
Start: 2020-12-10 | End: 2021-10-26 | Stop reason: SDUPTHER

## 2020-12-10 RX ORDER — LISINOPRIL 10 MG/1
10 TABLET ORAL DAILY
Qty: 30 TABLET | Refills: 5 | Status: SHIPPED | OUTPATIENT
Start: 2020-12-10 | End: 2021-04-19 | Stop reason: SDUPTHER

## 2021-01-12 ENCOUNTER — LAB (OUTPATIENT)
Dept: LAB | Facility: HOSPITAL | Age: 66
End: 2021-01-12
Attending: INTERNAL MEDICINE
Payer: COMMERCIAL

## 2021-01-12 ENCOUNTER — HOSPITAL ENCOUNTER (OUTPATIENT)
Dept: RADIOLOGY | Facility: HOSPITAL | Age: 66
Discharge: HOME | End: 2021-01-12
Attending: INTERNAL MEDICINE
Payer: COMMERCIAL

## 2021-01-12 DIAGNOSIS — I10 ESSENTIAL HYPERTENSION: ICD-10-CM

## 2021-01-12 DIAGNOSIS — R06.00 DYSPNEA ON EXERTION: ICD-10-CM

## 2021-01-12 DIAGNOSIS — E03.8 OTHER SPECIFIED HYPOTHYROIDISM: ICD-10-CM

## 2021-01-12 LAB
ANION GAP SERPL CALCULATED.3IONS-SCNC: 0 MMOL/L (ref 4–13)
BUN SERPL-MCNC: 18 MG/DL (ref 5–25)
CALCIUM SERPL-MCNC: 9.3 MG/DL (ref 8.3–10.1)
CHLORIDE SERPL-SCNC: 107 MMOL/L (ref 100–108)
CO2 SERPL-SCNC: 34 MMOL/L (ref 21–32)
CREAT SERPL-MCNC: 1.72 MG/DL (ref 0.6–1.3)
ERYTHROCYTE [DISTWIDTH] IN BLOOD BY AUTOMATED COUNT: 13.4 % (ref 11.6–15.1)
GFR SERPL CREATININE-BSD FRML MDRD: 41 ML/MIN/1.73SQ M
GLUCOSE P FAST SERPL-MCNC: 88 MG/DL (ref 65–99)
HCT VFR BLD AUTO: 49 % (ref 36.5–49.3)
HGB BLD-MCNC: 14.9 G/DL (ref 12–17)
MCH RBC QN AUTO: 28.6 PG (ref 26.8–34.3)
MCHC RBC AUTO-ENTMCNC: 30.4 G/DL (ref 31.4–37.4)
MCV RBC AUTO: 94 FL (ref 82–98)
PLATELET # BLD AUTO: 234 THOUSANDS/UL (ref 149–390)
PMV BLD AUTO: 12.1 FL (ref 8.9–12.7)
POTASSIUM SERPL-SCNC: 4.4 MMOL/L (ref 3.5–5.3)
RBC # BLD AUTO: 5.21 MILLION/UL (ref 3.88–5.62)
SODIUM SERPL-SCNC: 141 MMOL/L (ref 136–145)
T4 FREE SERPL-MCNC: 0.74 NG/DL (ref 0.76–1.46)
TSH SERPL DL<=0.05 MIU/L-ACNC: 18.1 UIU/ML (ref 0.36–3.74)
WBC # BLD AUTO: 9.87 THOUSAND/UL (ref 4.31–10.16)

## 2021-01-12 PROCEDURE — 71046 X-RAY EXAM CHEST 2 VIEWS: CPT

## 2021-01-12 PROCEDURE — 85027 COMPLETE CBC AUTOMATED: CPT

## 2021-01-12 PROCEDURE — 84443 ASSAY THYROID STIM HORMONE: CPT

## 2021-01-12 PROCEDURE — 36415 COLL VENOUS BLD VENIPUNCTURE: CPT

## 2021-01-12 PROCEDURE — 80048 BASIC METABOLIC PNL TOTAL CA: CPT

## 2021-01-12 PROCEDURE — 84439 ASSAY OF FREE THYROXINE: CPT

## 2021-01-13 ENCOUNTER — OFFICE VISIT (OUTPATIENT)
Dept: FAMILY MEDICINE CLINIC | Facility: HOSPITAL | Age: 66
End: 2021-01-13
Payer: COMMERCIAL

## 2021-01-13 VITALS
HEIGHT: 71 IN | BODY MASS INDEX: 39.76 KG/M2 | HEART RATE: 64 BPM | DIASTOLIC BLOOD PRESSURE: 72 MMHG | RESPIRATION RATE: 22 BRPM | WEIGHT: 284 LBS | SYSTOLIC BLOOD PRESSURE: 110 MMHG

## 2021-01-13 DIAGNOSIS — R06.00 DYSPNEA ON EXERTION: ICD-10-CM

## 2021-01-13 DIAGNOSIS — I10 ESSENTIAL HYPERTENSION: Primary | ICD-10-CM

## 2021-01-13 DIAGNOSIS — E66.01 SEVERE OBESITY (BMI 35.0-39.9) WITH COMORBIDITY (HCC): ICD-10-CM

## 2021-01-13 DIAGNOSIS — N18.32 STAGE 3B CHRONIC KIDNEY DISEASE (HCC): ICD-10-CM

## 2021-01-13 DIAGNOSIS — E03.8 OTHER SPECIFIED HYPOTHYROIDISM: ICD-10-CM

## 2021-01-13 DIAGNOSIS — F43.21 GRIEF REACTION: ICD-10-CM

## 2021-01-13 PROCEDURE — 99214 OFFICE O/P EST MOD 30 MIN: CPT | Performed by: INTERNAL MEDICINE

## 2021-01-13 RX ORDER — FUROSEMIDE 40 MG/1
80 TABLET ORAL EVERY MORNING
Qty: 60 TABLET | Refills: 5 | Status: SHIPPED | OUTPATIENT
Start: 2021-01-13 | End: 2021-06-02 | Stop reason: SDUPTHER

## 2021-01-13 RX ORDER — LEVOTHYROXINE SODIUM 0.12 MG/1
125 TABLET ORAL EVERY MORNING
Qty: 30 TABLET | Refills: 5 | Status: SHIPPED | OUTPATIENT
Start: 2021-02-24 | End: 2021-01-14 | Stop reason: SDUPTHER

## 2021-01-13 NOTE — ASSESSMENT & PLAN NOTE
Blood pressure is stable, will continue lisinopril, metoprolol and will increase patient's Lasix to 80 mg in the morning because he still has lower extremity edema and still feels out of breath with exertion

## 2021-01-13 NOTE — ASSESSMENT & PLAN NOTE
Lab Results   Component Value Date    EGFR 41 01/12/2021    EGFR 41 12/09/2020    EGFR 47 09/26/2020    CREATININE 1 72 (H) 01/12/2021    CREATININE 1 69 (H) 12/09/2020    CREATININE 1 54 (H) 09/26/2020       Patient has stage IIIB chronic disease  Renal function is at baseline    I will check renal function after increasing Lasix to 80 mg in the morning

## 2021-01-13 NOTE — ASSESSMENT & PLAN NOTE
Patient's shortness of breath on exertion is slightly better but not much  I hear no expiratory wheezing  Will stop but that has I would nebulizers  I still see lower extremity edema and will increase patient's Lasix to 80 mg in the morning  I reviewed chest x-ray that to me showed no CHF, masses or infiltrates  Official reading is pending      I encouraged patient to get the echocardiogram done to see how his heart valves and heart function

## 2021-01-14 ENCOUNTER — TELEPHONE (OUTPATIENT)
Dept: FAMILY MEDICINE CLINIC | Facility: HOSPITAL | Age: 66
End: 2021-01-14

## 2021-01-14 DIAGNOSIS — E03.8 OTHER SPECIFIED HYPOTHYROIDISM: ICD-10-CM

## 2021-01-14 RX ORDER — LEVOTHYROXINE SODIUM 0.12 MG/1
125 TABLET ORAL EVERY MORNING
Qty: 30 TABLET | Refills: 5 | Status: SHIPPED | OUTPATIENT
Start: 2021-02-24 | End: 2021-04-14

## 2021-01-14 NOTE — TELEPHONE ENCOUNTER
Pt said the 125 mcg of levothyroxine you increased it too was not sent pharmacy can you please send into pharmacy please  Thank you!

## 2021-01-14 NOTE — TELEPHONE ENCOUNTER
Left msg for pt on his voicemail  His name was on his voicemail  Let him know that dr Alwyn Ganser wants him to start his levothyroxine 125 mcg one daily now   And was sent into the pharmacy per dr Alwyn Ganser

## 2021-03-04 ENCOUNTER — TELEPHONE (OUTPATIENT)
Dept: FAMILY MEDICINE CLINIC | Facility: HOSPITAL | Age: 66
End: 2021-03-04

## 2021-03-05 NOTE — TELEPHONE ENCOUNTER
I would place ice packs on the area and he can take Tylenol a 1000 mg 3 times a day  I agree with definitive treatment by the or surgeon!

## 2021-03-22 ENCOUNTER — TELEPHONE (OUTPATIENT)
Dept: FAMILY MEDICINE CLINIC | Facility: HOSPITAL | Age: 66
End: 2021-03-22

## 2021-03-22 DIAGNOSIS — K12.0 CANKER SORES ORAL: Primary | ICD-10-CM

## 2021-03-22 RX ORDER — VALACYCLOVIR HYDROCHLORIDE 1 G/1
2000 TABLET, FILM COATED ORAL 2 TIMES DAILY
Qty: 8 TABLET | Refills: 3 | Status: SHIPPED | OUTPATIENT
Start: 2021-03-22 | End: 2021-05-21

## 2021-03-22 NOTE — TELEPHONE ENCOUNTER
Rx  For valtrex sent to pharmacy, he can also purchase oral rinses for pain over the counter, he needs to check with pharmacist

## 2021-03-29 ENCOUNTER — NURSE TRIAGE (OUTPATIENT)
Dept: OTHER | Facility: OTHER | Age: 66
End: 2021-03-29

## 2021-03-29 ENCOUNTER — HOSPITAL ENCOUNTER (EMERGENCY)
Facility: HOSPITAL | Age: 66
Discharge: HOME | End: 2021-03-29
Attending: EMERGENCY MEDICINE | Admitting: EMERGENCY MEDICINE
Payer: COMMERCIAL

## 2021-03-29 VITALS
SYSTOLIC BLOOD PRESSURE: 196 MMHG | RESPIRATION RATE: 18 BRPM | OXYGEN SATURATION: 100 % | DIASTOLIC BLOOD PRESSURE: 101 MMHG | HEIGHT: 71 IN | BODY MASS INDEX: 40.04 KG/M2 | WEIGHT: 286 LBS | HEART RATE: 74 BPM | TEMPERATURE: 98.1 F

## 2021-03-29 DIAGNOSIS — K12.0 APHTHOUS ULCER OF MOUTH: Primary | ICD-10-CM

## 2021-03-29 PROCEDURE — 99284 EMERGENCY DEPT VISIT MOD MDM: CPT | Performed by: EMERGENCY MEDICINE

## 2021-03-29 PROCEDURE — 99283 EMERGENCY DEPT VISIT LOW MDM: CPT

## 2021-03-29 RX ORDER — OXYCODONE HYDROCHLORIDE AND ACETAMINOPHEN 5; 325 MG/1; MG/1
1 TABLET ORAL ONCE
Status: COMPLETED | OUTPATIENT
Start: 2021-03-29 | End: 2021-03-29

## 2021-03-29 RX ORDER — OXYCODONE HYDROCHLORIDE AND ACETAMINOPHEN 5; 325 MG/1; MG/1
1 TABLET ORAL EVERY 4 HOURS PRN
Qty: 15 TABLET | Refills: 0 | Status: SHIPPED | OUTPATIENT
Start: 2021-03-29 | End: 2021-04-08

## 2021-03-29 RX ORDER — LIDOCAINE HYDROCHLORIDE 20 MG/ML
10 SOLUTION OROPHARYNGEAL 4 TIMES DAILY PRN
Qty: 100 ML | Refills: 0 | Status: SHIPPED | OUTPATIENT
Start: 2021-03-29 | End: 2021-05-21

## 2021-03-29 RX ORDER — LIDOCAINE HYDROCHLORIDE 20 MG/ML
15 SOLUTION OROPHARYNGEAL ONCE
Status: COMPLETED | OUTPATIENT
Start: 2021-03-29 | End: 2021-03-29

## 2021-03-29 RX ADMIN — LIDOCAINE HYDROCHLORIDE 15 ML: 20 SOLUTION ORAL; TOPICAL at 20:53

## 2021-03-29 RX ADMIN — DEXAMETHASONE SODIUM PHOSPHATE 10 MG: 10 INJECTION, SOLUTION INTRAMUSCULAR; INTRAVENOUS at 20:53

## 2021-03-29 RX ADMIN — OXYCODONE HYDROCHLORIDE AND ACETAMINOPHEN 1 TABLET: 5; 325 TABLET ORAL at 20:53

## 2021-03-29 NOTE — TELEPHONE ENCOUNTER
Regarding: canker sore about 4weeks gotten worse was prescribed Valtrex 1,000 mg   ----- Message from Bon Diallo sent at 3/29/2021  7:45 PM EDT -----  " My dad as a canker sores about 4 weeks now and he was prescribed Valtrex 1,000 mg and it as not help as much and it looks like it gotten worse "

## 2021-03-29 NOTE — TELEPHONE ENCOUNTER
Reason for Disposition   [1] One pimple or ulcer on the gum AND [2] near a toothache    Additional Information   Canker sore suspected (i e , aphthous ulcer) in the mouth    Answer Assessment - Initial Assessment Questions  1  APPEARANCE of BLISTERS: "Describe the sores "      "appears white"  2  SIZE: "How large an area is involved with the cold sores?" (e g , inches, cm or compare to coins)       1 inch  3  LOCATION: "Which part of the lip is involved?"      Inside mouth   4  ONSET: "When did the fever blisters begin?"      6 days ago   5  RECURRENT BLISTERS: "Have you had fever blisters before?" If so, ask: "When was the last time?" "How many times a year?"      Has history of mouth sores  6  OTHER SYMPTOMS: "Do you have any other symptoms?" (e g , fever, sores inside mouth)      Extreme pain    Answer Assessment - Initial Assessment Questions  1  ONSET: "When did the mouth start hurting?" (e g , hours or days ago)       6 days  2  SEVERITY: "How bad is the pain?" (Scale 1-10; mild, moderate or severe)    - MILD (1-3):  doesn't interfere with eating or normal activities    - MODERATE (4-7): interferes with eating some solids and normal activities    - SEVERE (8-10):  excruciating pain, interferes with most normal activities    - SEVERE DYSPHAGIA: can't swallow liquids, drooling      Severe  9  3  SORES: "Are there any sores or ulcers in the mouth?" If so, ask: "What part of the mouth are the sores in?"      Ulcer under tongue  4  FEVER: "Do you have a fever?" If so, ask: "What is your temperature, how was it measured, and when did it start?"      No fever  5  CAUSE: "What do you think is causing the mouth pain?"       Canker sore   6   OTHER SYMPTOMS: "Do you have any other symptoms?" (e g , difficulty breathing)    Protocols used: MOUTH ULCERS-ADULT-AH, MOUTH PAIN-ADULT-AH, COLD SORES - FEVER BLISTERS OF LIP-ADULT-OH

## 2021-03-30 NOTE — ED PROVIDER NOTES
History  Chief Complaint   Patient presents with    Mouth Lesions     Pt to ED with c/o of mouth sore under tongue for about 4 weeks, "appeared better and now getting worse"     58-year-old male presents for evaluation of oral pain related to a lesion under the left side of his tongue that has been there for a month worsening over the past inferior so  The patient has tried multiple over-the-counter medications without improvement  He was seen by his primary care doctor and put on Valtrex without improvement  Mouth Lesions  Associated symptoms: no fever and no sore throat        Prior to Admission Medications   Prescriptions Last Dose Informant Patient Reported? Taking?    albuterol (ProAir HFA) 90 mcg/act inhaler   No No   Sig: Inhale 2 puffs every 6 (six) hours as needed for wheezing   furosemide (LASIX) 40 mg tablet   No No   Sig: Take 2 tablets (80 mg total) by mouth every morning   ipratropium-albuterol (DUO-NEB) 0 5-2 5 mg/3 mL nebulizer solution   No No   Sig: Take 1 vial (3 mL total) by nebulization every 8 (eight) hours as needed for wheezing or shortness of breath   levothyroxine 125 mcg tablet   No No   Sig: Take 1 tablet (125 mcg total) by mouth every morning   lisinopril (ZESTRIL) 10 mg tablet   No No   Sig: Take 1 tablet (10 mg total) by mouth daily   metoprolol tartrate (LOPRESSOR) 25 mg tablet  Self No No   Sig: TAKE 1 TABLET BY MOUTH 2 TIMES A DAY   valACYclovir (VALTREX) 1,000 mg tablet   No No   Sig: Take 2 tablets (2,000 mg total) by mouth 2 (two) times a day for 2 days      Facility-Administered Medications: None       Past Medical History:   Diagnosis Date    Cellulitis, trunk     CKD (chronic kidney disease), stage III     CPAP (continuous positive airway pressure) dependence     Disease of thyroid gland     Gastroenteritis and colitis, viral     last assessed: 11/24/2015    Hernia     Hypertension     Lung mass     HUNG on CPAP     Renal mass        Past Surgical History: Procedure Laterality Date    ABDOMINAL ADHESION SURGERY N/A 1/13/2016    Procedure: LYSIS ADHESIONS;  Surgeon: Evie Moreland MD;  Location: [facility];  Service:     INGUINAL HERNIA REPAIR      LYMPH NODE DISSECTION N/A 1/13/2016    Procedure: DISSECTION/STAGING LYMPH NODE PELVIS/ABDOMEN;  Surgeon: Evie Moreland MD;  Location: [facility];  Service:     NEPHRECTOMY Right     patients believes it was the Right kidney    OH LAP,PARTIAL NEPHRECTOMY Right 1/13/2016    Procedure: NEPHRECTOMY PARTIAL LAPAROSCOPIC W ROBOTICS, intra op ultrasound,laparoscopic lysis of adhesions;  Surgeon: Evie Moreland MD;  Location: [facility];  Service: Urology       Family History   Problem Relation Age of Onset    Lung cancer Father     Melanoma Father     Other Father         renal insufficiency    Dementia Mother     Mental illness Neg Hx     Substance Abuse Neg Hx     Colon polyps Neg Hx     Colon cancer Neg Hx      I have reviewed and agree with the history as documented  E-Cigarette/Vaping    E-Cigarette Use Former User      E-Cigarette/Vaping Substances    Nicotine No     THC No     CBD No     Flavoring No     Other No     Unknown No      Social History     Tobacco Use    Smoking status: Former Smoker     Packs/day: 1 00     Years: 30 00     Pack years: 30 00     Quit date: 8/26/2010     Years since quitting: 10 5    Smokeless tobacco: Former User    Tobacco comment: not in last 12 months  quit smoking arnd 6/2013, used to smoke about 1 pack per week   Substance Use Topics    Alcohol use: Yes     Frequency: Monthly or less     Comment: social  ocasional    Drug use: No       Review of Systems   Constitutional: Negative for fever  HENT: Positive for dental problem and mouth sores  Negative for sore throat, trouble swallowing and voice change  All other systems reviewed and are negative  Physical Exam  Physical Exam  Vitals signs and nursing note reviewed     Constitutional:       General: He is not in acute distress  Appearance: He is well-developed  HENT:      Head: Normocephalic and atraumatic  Right Ear: External ear normal       Left Ear: External ear normal       Mouth/Throat:      Lips: Pink  No lesions  Dentition: Dental caries present  Tongue: Lesions ( Large approximately 2 cm x 1 cm lesion to the lateral underside of the left tongue  There is no purulence or bloody drainage from the lesion) present  Pharynx: Oropharynx is clear  Uvula midline  Eyes:      General: No scleral icterus  Neck:      Musculoskeletal: Normal range of motion  Pulmonary:      Effort: Pulmonary effort is normal  No respiratory distress  Abdominal:      Comments: Protuberant   Musculoskeletal: Normal range of motion  Lymphadenopathy:      Cervical: No cervical adenopathy  Skin:     Findings: No rash  Neurological:      Mental Status: He is alert        Gait: Gait normal    Psychiatric:         Mood and Affect: Mood normal          Vital Signs  ED Triage Vitals   Temperature Pulse Respirations Blood Pressure SpO2   03/29/21 2035 03/29/21 2035 03/29/21 2035 03/29/21 2035 03/29/21 2035   98 1 °F (36 7 °C) 74 18 (!) 196/101 100 %      Temp src Heart Rate Source Patient Position - Orthostatic VS BP Location FiO2 (%)   -- -- -- -- --             Pain Score       03/29/21 2053       Worst Possible Pain           Vitals:    03/29/21 2035   BP: (!) 196/101   Pulse: 74         Visual Acuity      ED Medications  Medications   dexamethasone oral liquid 10 mg 1 mL (10 mg Oral Given 3/29/21 2053)   oxyCODONE-acetaminophen (PERCOCET) 5-325 mg per tablet 1 tablet (1 tablet Oral Given 3/29/21 2053)   Lidocaine Viscous HCl (XYLOCAINE) 2 % mucosal solution 15 mL (15 mL Swish & Spit Given 3/29/21 2053)       Diagnostic Studies  Results Reviewed     None                 No orders to display              Procedures  Procedures         ED Course                             SBIRT 20yo+      Most Recent Value   SBIRT (24 yo +)   In order to provide better care to our patients, we are screening all of our patients for alcohol and drug use  Would it be okay to ask you these screening questions? Yes Filed at: 03/29/2021 2050   Initial Alcohol Screen: US AUDIT-C    1  How often do you have a drink containing alcohol? 1 Filed at: 03/29/2021 2050   2  How many drinks containing alcohol do you have on a typical day you are drinking? 0 Filed at: 03/29/2021 2050   3a  Male UNDER 65: How often do you have five or more drinks on one occasion? 0 Filed at: 03/29/2021 2050   3b  FEMALE Any Age, or MALE 65+: How often do you have 4 or more drinks on one occassion? 0 Filed at: 03/29/2021 2050   Audit-C Score  1 Filed at: 03/29/2021 2050   TONA: How many times in the past year have you    Used an illegal drug or used a prescription medication for non-medical reasons? Never Filed at: 03/29/2021 2050                    MDM  Number of Diagnoses or Management Options  Aphthous ulcer of mouth:   Diagnosis management comments: Plan is for pain control with dexamethasone swish and swallow, lidocaine swish and Percocet  The patient was advised that he must follow up with Oral surgery as I am concerned that this may be something other than an aphthous ulcer such as an oral cancer  Patient verbalized understanding the need to follow up with Oral surgery             Amount and/or Complexity of Data Reviewed  Review and summarize past medical records: yes        Disposition  Final diagnoses:   Aphthous ulcer of mouth     Time reflects when diagnosis was documented in both MDM as applicable and the Disposition within this note     Time User Action Codes Description Comment    3/29/2021  8:41 PM Thelma Garza Add [K12 0] Aphthous ulcer of mouth       ED Disposition     ED Disposition Condition Date/Time Comment    Discharge Stable Mon Mar 29, 2021  8:41 PM Radha Xiao discharge to home/self care              Follow-up Information Follow up With Specialties Details Why Contact Info    [facility] Schedule an appointment as soon as possible for a visit  For further evaluation [address] [facility] [id number]  [phone]            Discharge Medication List as of 3/29/2021  8:44 PM      START taking these medications    Details   Lidocaine Viscous HCl (XYLOCAINE) 2 % mucosal solution Swish and spit 10 mL 4 (four) times a day as needed for mouth pain or discomfort, Starting Mon 3/29/2021, Normal      oxyCODONE-acetaminophen (PERCOCET) 5-325 mg per tablet Take 1 tablet by mouth every 4 (four) hours as needed for moderate pain for up to 10 daysMax Daily Amount: 6 tablets, Starting Mon 3/29/2021, Until Thu 4/8/2021, Normal         CONTINUE these medications which have NOT CHANGED    Details   albuterol (ProAir HFA) 90 mcg/act inhaler Inhale 2 puffs every 6 (six) hours as needed for wheezing, Starting Thu 11/12/2020, Normal      furosemide (LASIX) 40 mg tablet Take 2 tablets (80 mg total) by mouth every morning, Starting Wed 1/13/2021, Until Fri 2/12/2021, Normal      ipratropium-albuterol (DUO-NEB) 0 5-2 5 mg/3 mL nebulizer solution Take 1 vial (3 mL total) by nebulization every 8 (eight) hours as needed for wheezing or shortness of breath, Starting Thu 12/10/2020, Normal      levothyroxine 125 mcg tablet Take 1 tablet (125 mcg total) by mouth every morning, Starting Wed 2/24/2021, Normal      lisinopril (ZESTRIL) 10 mg tablet Take 1 tablet (10 mg total) by mouth daily, Starting Thu 12/10/2020, Until Wed 1/13/2021, Normal      metoprolol tartrate (LOPRESSOR) 25 mg tablet TAKE 1 TABLET BY MOUTH 2 TIMES A DAY, Normal      valACYclovir (VALTREX) 1,000 mg tablet Take 2 tablets (2,000 mg total) by mouth 2 (two) times a day for 2 days, Starting Mon 3/22/2021, Until Wed 3/24/2021, Normal           No discharge procedures on file      PDMP Review       Value Time User    PDMP Reviewed  Yes 2/9/2020 12:16 PM Joseph Iqbal Zaheer Dave          ED Provider  Electronically Signed by           Drake Gowers,   03/29/21 [id number]

## 2021-04-10 ENCOUNTER — LAB (OUTPATIENT)
Dept: LAB | Facility: HOSPITAL | Age: 66
End: 2021-04-10
Attending: INTERNAL MEDICINE
Payer: COMMERCIAL

## 2021-04-10 ENCOUNTER — HOSPITAL ENCOUNTER (OUTPATIENT)
Dept: NON INVASIVE DIAGNOSTICS | Facility: HOSPITAL | Age: 66
Discharge: HOME | End: 2021-04-10
Attending: INTERNAL MEDICINE
Payer: COMMERCIAL

## 2021-04-10 DIAGNOSIS — R06.00 DYSPNEA ON EXERTION: ICD-10-CM

## 2021-04-10 DIAGNOSIS — E03.8 OTHER SPECIFIED HYPOTHYROIDISM: ICD-10-CM

## 2021-04-10 DIAGNOSIS — I10 ESSENTIAL HYPERTENSION: ICD-10-CM

## 2021-04-10 LAB
ANION GAP SERPL CALCULATED.3IONS-SCNC: 6 MMOL/L (ref 4–13)
BUN SERPL-MCNC: 16 MG/DL (ref 5–25)
CALCIUM SERPL-MCNC: 9.2 MG/DL (ref 8.3–10.1)
CHLORIDE SERPL-SCNC: 105 MMOL/L (ref 100–108)
CO2 SERPL-SCNC: 29 MMOL/L (ref 21–32)
CREAT SERPL-MCNC: 1.74 MG/DL (ref 0.6–1.3)
GFR SERPL CREATININE-BSD FRML MDRD: 40 ML/MIN/1.73SQ M
GLUCOSE P FAST SERPL-MCNC: 121 MG/DL (ref 65–99)
POTASSIUM SERPL-SCNC: 4 MMOL/L (ref 3.5–5.3)
SODIUM SERPL-SCNC: 140 MMOL/L (ref 136–145)
T4 FREE SERPL-MCNC: 0.92 NG/DL (ref 0.76–1.46)
TSH SERPL DL<=0.05 MIU/L-ACNC: 25.9 UIU/ML (ref 0.36–3.74)

## 2021-04-10 PROCEDURE — 84439 ASSAY OF FREE THYROXINE: CPT

## 2021-04-10 PROCEDURE — 84443 ASSAY THYROID STIM HORMONE: CPT

## 2021-04-10 PROCEDURE — 36415 COLL VENOUS BLD VENIPUNCTURE: CPT

## 2021-04-10 PROCEDURE — 80048 BASIC METABOLIC PNL TOTAL CA: CPT

## 2021-04-10 PROCEDURE — 93306 TTE W/DOPPLER COMPLETE: CPT

## 2021-04-12 PROCEDURE — 93306 TTE W/DOPPLER COMPLETE: CPT | Performed by: INTERNAL MEDICINE

## 2021-04-14 ENCOUNTER — OFFICE VISIT (OUTPATIENT)
Dept: FAMILY MEDICINE CLINIC | Facility: HOSPITAL | Age: 66
End: 2021-04-14
Payer: COMMERCIAL

## 2021-04-14 VITALS
DIASTOLIC BLOOD PRESSURE: 86 MMHG | WEIGHT: 284 LBS | HEART RATE: 84 BPM | SYSTOLIC BLOOD PRESSURE: 140 MMHG | BODY MASS INDEX: 39.76 KG/M2 | HEIGHT: 71 IN | RESPIRATION RATE: 16 BRPM

## 2021-04-14 DIAGNOSIS — I10 ESSENTIAL HYPERTENSION: ICD-10-CM

## 2021-04-14 DIAGNOSIS — K12.1 ORAL ULCER: Primary | ICD-10-CM

## 2021-04-14 DIAGNOSIS — E03.8 OTHER SPECIFIED HYPOTHYROIDISM: ICD-10-CM

## 2021-04-14 DIAGNOSIS — R06.00 DYSPNEA ON EXERTION: ICD-10-CM

## 2021-04-14 PROCEDURE — 99214 OFFICE O/P EST MOD 30 MIN: CPT | Performed by: INTERNAL MEDICINE

## 2021-04-14 RX ORDER — METOPROLOL TARTRATE 50 MG/1
50 TABLET, FILM COATED ORAL EVERY 12 HOURS SCHEDULED
Qty: 180 TABLET | Refills: 1 | Status: SHIPPED | OUTPATIENT
Start: 2021-04-14 | End: 2022-07-20

## 2021-04-14 RX ORDER — LEVOTHYROXINE SODIUM 0.15 MG/1
150 TABLET ORAL EVERY MORNING
Qty: 90 TABLET | Refills: 1 | Status: SHIPPED | OUTPATIENT
Start: 2021-04-14 | End: 2022-07-22 | Stop reason: SDUPTHER

## 2021-04-14 NOTE — PROGRESS NOTES
Assessment/Plan:    Dyspnea on exertion  Dyspnea on exertion persists, relieved by rest  Will refer to Cardiology for evaluation of cad, angina equivalent? Dyspnea could also be due to deconditioning and severe obesity  We discussed diet and especially cutting down on soft drinks containing high fructose corn syrup which he consumes daily  Other specified hypothyroidism  TSH is worse since I increased levothyroxine  I will increase levothyroxine to 150 mcg in the morning and will check TSH in about 6 weeks  I asked patient to take levothyroxine every morning    Essential hypertension  Blood pressure is better controlled but ago  I increased metoprolol to 50 mg p o  B i d        Diagnoses and all orders for this visit:    Oral ulcer  -     al mag oxide-diphenhydramine-lidocaine viscous (MAGIC MOUTHWASH) 1:1:1 suspension; Swish and spit 5 mL every 6 (six) hours as needed for mouth pain or discomfort    Other specified hypothyroidism  -     levothyroxine 150 mcg tablet; Take 1 tablet (150 mcg total) by mouth every morning  -     TSH, 3rd generation with Free T4 reflex; Future    Essential hypertension  -     metoprolol tartrate (LOPRESSOR) 50 mg tablet; Take 1 tablet (50 mg total) by mouth every 12 (twelve) hours    Dyspnea on exertion  -     Ambulatory referral to Cardiology; Future          Subjective:      Patient ID: Rohan Maya is a 76 y o  male  Patient has persistent shortness of breath on exertion  He has to stop what he is doing and catch his breath by sitting down before he can resume walking or his activities  Denies any chest pain or palpitations  He denies lower extremity edema, paroxysmal nocturnal dyspnea and orthopnea  He developed an ulcer on the left side of his tongue adjacent to the left lower molars which is painful  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 extract 1 of his teeth      He has been taking his levothyroxine but sometimes he misses a does maybe once a week   His TSH is not controlled in fact is worse since I increased the dose of levothyroxine  He is also here for hypertension follow-up  Has been taking his lisinopril, metoprolol and Lasix  Pressure is improved but not at goal         The following portions of the patient's history were reviewed and updated as appropriate: current medications, past family history, past medical history, past social history, past surgical history and problem list     Review of Systems   Constitutional: Negative for fever and unexpected weight change  HENT:        Ulcer of the tongue   Respiratory: Positive for shortness of breath (on exertion)  Negative for cough  Cardiovascular: Negative for chest pain  Genitourinary: Negative for difficulty urinating  Neurological: Negative for dizziness and light-headedness  Objective:    /86   Pulse 84   Resp 16   Ht 5' 11" (1 803 m)   Wt 129 kg (284 lb)   BMI 39 61 kg/m²      Physical Exam  HENT:      Head: Normocephalic  Mouth/Throat:      Pharynx: No oropharyngeal exudate  Comments: Pt has decayed teeth, has an shallow, clean based but larga ulcer on the left side of the tongue opposite to his lower molars  Eyes:      Conjunctiva/sclera: Conjunctivae normal    Cardiovascular:      Rate and Rhythm: Normal rate and regular rhythm  Heart sounds: No murmur  Pulmonary:      Effort: No respiratory distress  Breath sounds: No wheezing or rales  Skin:     General: Skin is warm and dry  Comments: Pt has no LE edema   Neurological:      Mental Status: He is alert               Melissa Ardon MD

## 2021-04-14 NOTE — ASSESSMENT & PLAN NOTE
TSH is worse since I increased levothyroxine  I will increase levothyroxine to 150 mcg in the morning and will check TSH in about 6 weeks      I asked patient to take levothyroxine every morning

## 2021-04-19 DIAGNOSIS — I10 ESSENTIAL HYPERTENSION: ICD-10-CM

## 2021-04-19 RX ORDER — LISINOPRIL 10 MG/1
10 TABLET ORAL DAILY
Qty: 90 TABLET | Refills: 3 | Status: SHIPPED | OUTPATIENT
Start: 2021-04-19 | End: 2022-07-20 | Stop reason: ALTCHOICE

## 2021-05-21 ENCOUNTER — CONSULT (OUTPATIENT)
Dept: CARDIOLOGY CLINIC | Facility: CLINIC | Age: 66
End: 2021-05-21
Payer: COMMERCIAL

## 2021-05-21 VITALS
BODY MASS INDEX: 39.7 KG/M2 | HEART RATE: 56 BPM | WEIGHT: 283.6 LBS | DIASTOLIC BLOOD PRESSURE: 76 MMHG | SYSTOLIC BLOOD PRESSURE: 110 MMHG | HEIGHT: 71 IN

## 2021-05-21 DIAGNOSIS — R06.00 DYSPNEA ON EXERTION: ICD-10-CM

## 2021-05-21 PROCEDURE — 93000 ELECTROCARDIOGRAM COMPLETE: CPT | Performed by: INTERNAL MEDICINE

## 2021-05-21 PROCEDURE — 99203 OFFICE O/P NEW LOW 30 MIN: CPT | Performed by: INTERNAL MEDICINE

## 2021-05-21 NOTE — PROGRESS NOTES
Consultation - Cardiology   Ayse Glass 76 y o  male MRN: [id number]    Encounter: [id number]    Assessment/Plan     Assessment:     Shortness of breath  Hypertension  CKD3    Plan:    He has stable exertional dyspnea  EKG shows NSR with left axis  Recommend stress testing, echo and chest xray  Will touch base with him re: results  Hypertension: BP is stable on current medical therapy  History of Present Illness   Physician Requesting Consult: No att  providers found  Reason for Consult / Principal Problem: Shortness of breath  HPI: Ayse Glass is a 76y o  year old male who presents with shortness of breath  This has been going on for a few years  He is out of breath walking one flight of stairs  He has no chest pain, no palpitations, no orthopnea, no PND and no LE edema  He is on metoprolol and lisinopril for hypertension  He has CKD3 with Cr of 1 7  He takes lasix 80 mg daily  He has a history of renal cell CA s/p right nephrectomy  Former smoker  Family hx: no family hx of cad  Review of Systems   Constitution: Negative  HENT: Negative  Eyes: Negative  Cardiovascular: Positive for dyspnea on exertion  Respiratory: Positive for shortness of breath  Endocrine: Negative  Hematologic/Lymphatic: Negative  Skin: Negative  Musculoskeletal: Negative  Gastrointestinal: Negative  Genitourinary: Negative  Neurological: Negative  Psychiatric/Behavioral: Negative  Allergic/Immunologic: Negative          Historical Information   Past Medical History:   Diagnosis Date    Cellulitis, trunk     CKD (chronic kidney disease), stage III (HCC)     CPAP (continuous positive airway pressure) dependence     Disease of thyroid gland     Gastroenteritis and colitis, viral     last assessed: 11/24/2015    Hernia     Hypertension     Lung mass     HUNG on CPAP     Renal mass      Past Surgical History:   Procedure Laterality Date    ABDOMINAL ADHESION SURGERY N/A 1/13/2016    Procedure: LYSIS ADHESIONS;  Surgeon: Abhishek Dean MD;  [facility];  Service:     INGUINAL HERNIA REPAIR      LYMPH NODE DISSECTION N/A 1/13/2016    Procedure: DISSECTION/STAGING LYMPH NODE PELVIS/ABDOMEN;  Surgeon: Abhishek Dean MD;  [facility];  Service:     NEPHRECTOMY Right     patients believes it was the Right kidney    [place] LAP,PARTIAL NEPHRECTOMY Right 1/13/2016    Procedure: NEPHRECTOMY PARTIAL LAPAROSCOPIC W ROBOTICS, intra op ultrasound,laparoscopic lysis of adhesions;  Surgeon: Abhishek Dean MD;  [facility];  Service: Urology       Social History:  Social History     Substance and Sexual Activity   Alcohol Use Yes    Frequency: Monthly or less    Comment: social  ocasional     Social History     Substance and Sexual Activity   Drug Use No     Social History     Tobacco Use   Smoking Status Former Smoker    Packs/day: 1 00    Years: 30 00    Pack years: 30 00    Quit date: 8/26/2010    Years since quitting: 10 7   Smokeless Tobacco Former User   Tobacco Comment    not in last 12 months  quit smoking arnd 6/2013, used to smoke about 1 pack per week       Family History:   Family History   Problem Relation Age of Onset    Lung cancer Father     Melanoma Father     Other Father         renal insufficiency    Dementia Mother     Mental illness Neg Hx     Substance Abuse Neg Hx     Colon polyps Neg Hx     Colon cancer Neg Hx        Meds/Allergies   Allergies   Allergen Reactions    Metoclopramide Anxiety       Current Outpatient Medications:     albuterol (ProAir HFA) 90 mcg/act inhaler, Inhale 2 puffs every 6 (six) hours as needed for wheezing, Disp: 1 Inhaler, Rfl: 2    furosemide (LASIX) 40 mg tablet, Take 2 tablets (80 mg total) by mouth every morning, Disp: 60 tablet, Rfl: 5    ipratropium-albuterol (DUO-NEB) 0 5-2 5 mg/3 mL nebulizer solution, Take 1 vial (3 mL total) by nebulization every 8 (eight) hours as needed for wheezing or shortness of breath, Disp: 90 vial, Rfl: 3    levothyroxine 150 mcg tablet, Take 1 tablet (150 mcg total) by mouth every morning, Disp: 90 tablet, Rfl: 1    lisinopril (ZESTRIL) 10 mg tablet, Take 1 tablet (10 mg total) by mouth daily, Disp: 90 tablet, Rfl: 3    metoprolol tartrate (LOPRESSOR) 50 mg tablet, Take 1 tablet (50 mg total) by mouth every 12 (twelve) hours, Disp: 180 tablet, Rfl: 1    Vitals:   Pulse: 56  Blood Pressue: 110/76  Weight: 129 kg (283 lb 9 6 oz)      Physical Exam   Constitutional: He is oriented to person, place, and time  He appears well-developed and well-nourished  No distress  HENT:   Head: Normocephalic  Eyes: Conjunctivae are normal  Right eye exhibits no discharge  No scleral icterus  Neck: Normal range of motion  No JVD present  Cardiovascular: Normal rate and regular rhythm  Exam reveals no gallop and no friction rub  No murmur heard  Pulmonary/Chest: Effort normal and breath sounds normal  No respiratory distress  He has no wheezes  He has no rales  Abdominal: Soft  Bowel sounds are normal  He exhibits no distension  There is no abdominal tenderness  There is no rebound  Musculoskeletal:         General: No tenderness, deformity or edema  Neurological: He is alert and oriented to person, place, and time  Skin: Skin is warm and dry  He is not diaphoretic  Psychiatric: He has a normal mood and affect         [unfilled]    Invasive Devices     None                 Lab Results   Component Value Date     04/22/2016     04/10/2021    CO2 29 04/10/2021    ANIONGAP 10 11/22/2015    BUN 16 04/10/2021    CREATININE 1 74 (H) 04/10/2021    EGFR 40 04/10/2021    GLUCOSE 115 (H) 04/22/2016    CALCIUM 9 2 04/10/2021    AST 10 12/09/2020    ALT 28 12/09/2020    ALKPHOS 68 12/09/2020    PROT 6 9 11/22/2015    BILITOT 0 35 11/22/2015     Lab Results   Component Value Date    WBC 9 87 01/12/2021    HGB 14 9 01/12/2021     01/12/2021     No components found for: TROP    Imaging:     EKG: NSR left axis  Counseling / Coordination of Care  Total floor / unit time spent today 45 minutes  Greater than 50% of total time was spent with the patient and / or family counseling and / or coordination of care  A description of the counseling / coordination of care

## 2021-06-02 DIAGNOSIS — I10 ESSENTIAL HYPERTENSION: ICD-10-CM

## 2021-06-02 RX ORDER — FUROSEMIDE 40 MG/1
80 TABLET ORAL EVERY MORNING
Qty: 60 TABLET | Refills: 5 | Status: SHIPPED | OUTPATIENT
Start: 2021-06-02 | End: 2022-07-28

## 2021-06-19 ENCOUNTER — HOSPITAL ENCOUNTER (OUTPATIENT)
Dept: RADIOLOGY | Facility: HOSPITAL | Age: 66
Discharge: HOME | End: 2021-06-19
Attending: INTERNAL MEDICINE
Payer: COMMERCIAL

## 2021-06-19 DIAGNOSIS — R06.00 DYSPNEA ON EXERTION: ICD-10-CM

## 2021-06-19 PROCEDURE — 71046 X-RAY EXAM CHEST 2 VIEWS: CPT

## 2021-06-22 ENCOUNTER — HOSPITAL ENCOUNTER (OUTPATIENT)
Dept: NON INVASIVE DIAGNOSTICS | Age: 66
Discharge: HOME | End: 2021-06-22

## 2021-06-22 DIAGNOSIS — R06.00 DYSPNEA ON EXERTION: ICD-10-CM

## 2021-07-06 ENCOUNTER — HOSPITAL ENCOUNTER (OUTPATIENT)
Dept: NON INVASIVE DIAGNOSTICS | Age: 66
Discharge: HOME | End: 2021-07-06
Payer: COMMERCIAL

## 2021-07-06 PROCEDURE — A9502 TC99M TETROFOSMIN: HCPCS

## 2021-07-06 PROCEDURE — 93017 CV STRESS TEST TRACING ONLY: CPT

## 2021-07-06 PROCEDURE — 93018 CV STRESS TEST I&R ONLY: CPT | Performed by: INTERNAL MEDICINE

## 2021-07-06 PROCEDURE — G1004 CDSM NDSC: HCPCS

## 2021-07-06 PROCEDURE — 93016 CV STRESS TEST SUPVJ ONLY: CPT | Performed by: INTERNAL MEDICINE

## 2021-07-06 PROCEDURE — 78452 HT MUSCLE IMAGE SPECT MULT: CPT

## 2021-07-06 PROCEDURE — 78452 HT MUSCLE IMAGE SPECT MULT: CPT | Performed by: INTERNAL MEDICINE

## 2021-07-06 RX ADMIN — REGADENOSON 0.4 MG: 0.08 INJECTION, SOLUTION INTRAVENOUS at 09:08

## 2021-07-28 ENCOUNTER — TELEPHONE (OUTPATIENT)
Dept: CARDIOLOGY CLINIC | Facility: CLINIC | Age: 66
End: 2021-07-28

## 2021-07-28 NOTE — TELEPHONE ENCOUNTER
----- Message from Amberly Combs, [address] sent at 7/28/2021  8:28 AM EDT -----    ----- Message -----  From: Hawk Francis MD  Sent: 7/27/2021   1:04 PM EDT  To: Cardiology [facility]

## 2021-10-26 ENCOUNTER — OFFICE VISIT (OUTPATIENT)
Dept: FAMILY MEDICINE CLINIC | Facility: HOSPITAL | Age: 66
End: 2021-10-26
Payer: COMMERCIAL

## 2021-10-26 VITALS
WEIGHT: 285 LBS | HEIGHT: 71 IN | BODY MASS INDEX: 39.9 KG/M2 | DIASTOLIC BLOOD PRESSURE: 80 MMHG | SYSTOLIC BLOOD PRESSURE: 132 MMHG | OXYGEN SATURATION: 94 % | HEART RATE: 80 BPM | RESPIRATION RATE: 16 BRPM

## 2021-10-26 DIAGNOSIS — Z12.12 SCREENING FOR COLORECTAL CANCER: ICD-10-CM

## 2021-10-26 DIAGNOSIS — Z12.11 SCREENING FOR COLORECTAL CANCER: ICD-10-CM

## 2021-10-26 DIAGNOSIS — F33.9 DEPRESSION, RECURRENT (HCC): ICD-10-CM

## 2021-10-26 DIAGNOSIS — Z00.00 MEDICARE ANNUAL WELLNESS VISIT, SUBSEQUENT: Primary | ICD-10-CM

## 2021-10-26 DIAGNOSIS — Z13.6 SCREENING FOR CARDIOVASCULAR CONDITION: ICD-10-CM

## 2021-10-26 DIAGNOSIS — G47.33 MILD OBSTRUCTIVE SLEEP APNEA: ICD-10-CM

## 2021-10-26 DIAGNOSIS — J20.9 ACUTE BRONCHITIS WITH BRONCHOSPASM: ICD-10-CM

## 2021-10-26 DIAGNOSIS — Z13.1 SCREENING FOR DIABETES MELLITUS: ICD-10-CM

## 2021-10-26 PROCEDURE — G0439 PPPS, SUBSEQ VISIT: HCPCS | Performed by: INTERNAL MEDICINE

## 2021-10-26 RX ORDER — IPRATROPIUM BROMIDE AND ALBUTEROL SULFATE 2.5; .5 MG/3ML; MG/3ML
3 SOLUTION RESPIRATORY (INHALATION) EVERY 8 HOURS PRN
Qty: 270 ML | Refills: 5 | Status: SHIPPED | OUTPATIENT
Start: 2021-10-26 | End: 2022-07-20 | Stop reason: SDUPTHER

## 2021-11-01 DIAGNOSIS — R06.2 WHEEZING: ICD-10-CM

## 2021-11-01 RX ORDER — ALBUTEROL SULFATE 90 UG/1
2 AEROSOL, METERED RESPIRATORY (INHALATION) EVERY 6 HOURS PRN
Qty: 17 G | Refills: 3 | Status: SHIPPED | OUTPATIENT
Start: 2021-11-01 | End: 2021-11-02 | Stop reason: SDUPTHER

## 2021-11-02 DIAGNOSIS — R06.2 WHEEZING: ICD-10-CM

## 2021-11-02 RX ORDER — ALBUTEROL SULFATE 90 UG/1
2 AEROSOL, METERED RESPIRATORY (INHALATION) EVERY 6 HOURS PRN
Qty: 17 G | Refills: 3 | Status: SHIPPED | OUTPATIENT
Start: 2021-11-02

## 2021-11-30 ENCOUNTER — APPOINTMENT (OUTPATIENT)
Dept: LAB | Facility: HOSPITAL | Age: 66
End: 2021-11-30
Attending: INTERNAL MEDICINE
Payer: COMMERCIAL

## 2021-11-30 DIAGNOSIS — Z13.1 SCREENING FOR DIABETES MELLITUS: ICD-10-CM

## 2021-11-30 DIAGNOSIS — Z13.6 SCREENING FOR CARDIOVASCULAR CONDITION: ICD-10-CM

## 2021-11-30 LAB
ANION GAP SERPL CALCULATED.3IONS-SCNC: 2 MMOL/L (ref 4–13)
BUN SERPL-MCNC: 19 MG/DL (ref 5–25)
CALCIUM SERPL-MCNC: 9.2 MG/DL (ref 8.3–10.1)
CHLORIDE SERPL-SCNC: 105 MMOL/L (ref 100–108)
CHOLEST SERPL-MCNC: 135 MG/DL
CO2 SERPL-SCNC: 32 MMOL/L (ref 21–32)
CREAT SERPL-MCNC: 1.6 MG/DL (ref 0.6–1.3)
GFR SERPL CREATININE-BSD FRML MDRD: 44 ML/MIN/1.73SQ M
GLUCOSE P FAST SERPL-MCNC: 98 MG/DL (ref 65–99)
HDLC SERPL-MCNC: 32 MG/DL
LDLC SERPL CALC-MCNC: 77 MG/DL (ref 0–100)
NONHDLC SERPL-MCNC: 103 MG/DL
POTASSIUM SERPL-SCNC: 4.2 MMOL/L (ref 3.5–5.3)
SODIUM SERPL-SCNC: 139 MMOL/L (ref 136–145)
TRIGL SERPL-MCNC: 130 MG/DL

## 2021-11-30 PROCEDURE — 80061 LIPID PANEL: CPT

## 2021-11-30 PROCEDURE — 80048 BASIC METABOLIC PNL TOTAL CA: CPT

## 2021-11-30 PROCEDURE — 36415 COLL VENOUS BLD VENIPUNCTURE: CPT

## 2021-12-01 ENCOUNTER — TELEPHONE (OUTPATIENT)
Dept: GASTROENTEROLOGY | Facility: CLINIC | Age: 66
End: 2021-12-01

## 2021-12-23 ENCOUNTER — NURSE TRIAGE (OUTPATIENT)
Dept: OTHER | Facility: OTHER | Age: 66
End: 2021-12-23

## 2022-01-15 ENCOUNTER — HOSPITAL ENCOUNTER (EMERGENCY)
Facility: HOSPITAL | Age: 67
Discharge: HOME | End: 2022-01-15
Attending: EMERGENCY MEDICINE
Payer: COMMERCIAL

## 2022-01-15 VITALS
RESPIRATION RATE: 20 BRPM | TEMPERATURE: 97.6 F | HEART RATE: 75 BPM | OXYGEN SATURATION: 98 % | SYSTOLIC BLOOD PRESSURE: 175 MMHG | BODY MASS INDEX: 39.97 KG/M2 | DIASTOLIC BLOOD PRESSURE: 108 MMHG | WEIGHT: 286.6 LBS

## 2022-01-15 DIAGNOSIS — B49 FUNGAL INFECTION: Primary | ICD-10-CM

## 2022-01-15 PROCEDURE — 99283 EMERGENCY DEPT VISIT LOW MDM: CPT

## 2022-01-15 PROCEDURE — 99281 EMR DPT VST MAYX REQ PHY/QHP: CPT | Performed by: EMERGENCY MEDICINE

## 2022-01-15 RX ORDER — CLOTRIMAZOLE 1 %
CREAM (GRAM) TOPICAL ONCE
Status: COMPLETED | OUTPATIENT
Start: 2022-01-15 | End: 2022-01-15

## 2022-01-15 RX ORDER — CLOTRIMAZOLE 1 %
CREAM (GRAM) TOPICAL
Qty: 15 G | Refills: 0 | Status: SHIPPED | OUTPATIENT
Start: 2022-01-15 | End: 2022-03-08 | Stop reason: ALTCHOICE

## 2022-01-15 RX ADMIN — CLOTRIMAZOLE 1 APPLICATION: 1 CREAM TOPICAL at 16:33

## 2022-01-15 NOTE — ED PROVIDER NOTES
History  Chief Complaint   Patient presents with    Abdominal Pain     To ED with c/o lower abd pain bilaterally pulling sensation when walking or standing for several days  78 yom with lower abdominal wall pain  Currently asymptomatic  Patient reports he felt it earlier today after being in a seated position for a long time  Improved with standing  No change with coughing or any other valsava maneuver  No diarrhea  No testicular pain  Unchanged with palpation  Prior to Admission Medications   Prescriptions Last Dose Informant Patient Reported? Taking?    albuterol (ProAir HFA) 90 mcg/act inhaler   No No   Sig: Inhale 2 puffs every 6 (six) hours as needed for wheezing   furosemide (LASIX) 40 mg tablet   No No   Sig: Take 2 tablets (80 mg total) by mouth every morning   ipratropium-albuterol (DUO-NEB) 0 5-2 5 mg/3 mL nebulizer solution   No No   Sig: Take 3 mL by nebulization every 8 (eight) hours as needed for wheezing or shortness of breath   levothyroxine 150 mcg tablet  Self No No   Sig: Take 1 tablet (150 mcg total) by mouth every morning   lisinopril (ZESTRIL) 10 mg tablet  Self No No   Sig: Take 1 tablet (10 mg total) by mouth daily   metoprolol tartrate (LOPRESSOR) 50 mg tablet  Self No No   Sig: Take 1 tablet (50 mg total) by mouth every 12 (twelve) hours      Facility-Administered Medications: None       Past Medical History:   Diagnosis Date    Cellulitis, trunk     CKD (chronic kidney disease), stage III (HCC)     CPAP (continuous positive airway pressure) dependence     Depression, recurrent ([address] ) 10/26/2021    Disease of thyroid gland     Gastroenteritis and colitis, viral     last assessed: 11/24/2015    Hernia     Hypertension     Lung mass     HUNG on CPAP     Renal mass        Past Surgical History:   Procedure Laterality Date    ABDOMINAL ADHESION SURGERY N/A 1/13/2016    Procedure: LYSIS ADHESIONS;  Surgeon: Merced Min MD;  [facility];  Service:    [place] HERNIA REPAIR      LYMPH NODE DISSECTION N/A 2016    Procedure: DISSECTION/STAGING LYMPH NODE PELVIS/ABDOMEN;  Surgeon: Merced Min MD;  [facility];  Service:     NEPHRECTOMY Right     patients believes it was the Right kidney    [place] LAP,PARTIAL NEPHRECTOMY Right 2016    Procedure: NEPHRECTOMY PARTIAL LAPAROSCOPIC W ROBOTICS, intra op ultrasound,laparoscopic lysis of adhesions;  Surgeon: Merced Min MD;  [facility];  Service: Urology       Family History   Problem Relation Age of Onset    Lung cancer Father     Melanoma Father     Other Father         renal insufficiency    Dementia Mother     Mental illness Neg Hx     Substance Abuse Neg Hx     Colon polyps Neg Hx     Colon cancer Neg Hx      I have reviewed and agree with the history as documented  E-Cigarette/Vaping    E-Cigarette Use Former User      E-Cigarette/Vaping Substances    Nicotine No     THC No     CBD No     Flavoring No     Other No     Unknown No      Social History     Tobacco Use    Smoking status: Former Smoker     Packs/day: 1 00     Years: 30 00     Pack years: 30 00     Quit date: 2010     Years since quittin 3    Smokeless tobacco: Former User    Tobacco comment: not in last 12 months  quit smoking arnd 2013, used to smoke about 1 pack per week   Vaping Use    Vaping Use: Former   Substance Use Topics    Alcohol use: Yes     Comment: social  ocasional    Drug use: No       Review of Systems   Gastrointestinal: Negative for abdominal pain  Musculoskeletal:        Abdominal wall pain   All other systems reviewed and are negative  Physical Exam  Physical Exam  Vitals and nursing note reviewed  Constitutional:       General: He is not in acute distress  Appearance: He is well-developed  HENT:      Head: Normocephalic and atraumatic        Right Ear: External ear normal       Left Ear: External ear normal       Nose: Nose normal    Eyes:      General: No scleral icterus  Pulmonary:      Effort: Pulmonary effort is normal  No respiratory distress  Abdominal:      General: There is no distension  Palpations: Abdomen is soft  Tenderness: There is no abdominal tenderness  Musculoskeletal:         General: No deformity  Normal range of motion  Cervical back: Normal range of motion and neck supple  Skin:     General: Skin is warm  Findings: No rash  Comments: Small area of maceration at abdominopelvic crease c/w fungal infection  Neurological:      General: No focal deficit present  Mental Status: He is alert  Gait: Gait normal    Psychiatric:         Mood and Affect: Mood normal          Vital Signs  ED Triage Vitals [01/15/22 1542]   Temperature Pulse Respirations Blood Pressure SpO2   97 6 °F (36 4 °C) 75 20 (!) 175/108 98 %      Temp Source Heart Rate Source Patient Position - Orthostatic VS BP Location FiO2 (%)   Temporal Monitor Sitting Right arm --      Pain Score       7           Vitals:    01/15/22 1542   BP: (!) 175/108   Pulse: 75   Patient Position - Orthostatic VS: Sitting         Visual Acuity      ED Medications  Medications   clotrimazole (LOTRIMIN) 1 % cream (1 application Topical Given 1/15/22 1633)       Diagnostic Studies  Results Reviewed     None                 No orders to display              Procedures  Procedures         ED Course                               SBIRT 22yo+      Most Recent Value   SBIRT (22 yo +)    In order to provide better care to our patients, we are screening all of our patients for alcohol and drug use  Would it be okay to ask you these screening questions? Yes Filed at: 01/15/2022 1545   Initial Alcohol Screen: US AUDIT-C     1  How often do you have a drink containing alcohol? 0 Filed at: 01/15/2022 8545   2  How many drinks containing alcohol do you have on a typical day you are drinking? 0 Filed at: 01/15/2022 1545   3a  Male UNDER 65:  How often do you have five or more drinks on one occasion? 0 Filed at: 01/15/2022 1545   3b  FEMALE Any Age, or MALE 65+: How often do you have 4 or more drinks on one occassion? 0 Filed at: 01/15/2022 1545   Audit-C Score 0 Filed at: 01/15/2022 1545   TONA: How many times in the past year have you    Used an illegal drug or used a prescription medication for non-medical reasons? Never Filed at: 01/15/2022 1545                    MDM  Number of Diagnoses or Management Options  Fungal infection: new and requires workup  Diagnosis management comments: 78 yom with fungal infection  Apply clotrimazole  Fu PCP for recheck  Amount and/or Complexity of Data Reviewed  Tests in the medicine section of CPT®: reviewed and ordered  Decide to obtain previous medical records or to obtain history from someone other than the patient: yes  Review and summarize past medical records: yes        Disposition  Final diagnoses:   Fungal infection     Time reflects when diagnosis was documented in both MDM as applicable and the Disposition within this note     Time User Action Codes Description Comment    1/15/2022  3:48 PM Shantell Delgado Add [B49] Fungal infection       ED Disposition     ED Disposition Condition Date/Time Comment    Discharge Stable Sat Alex 15, 2022  3:48 PM Myriam Rashid discharge to home/self care              Follow-up Information     Follow up With Specialties Details Why Contact Info Additional Information    Jose Luis Ceja MD Internal Medicine In 1 week For wound re-check [address] Emergency Department Emergency Medicine  If symptoms worsen [address] [place], [address]  [address] [facility], [address] [place] [address] Mario [address]          Discharge Medication List as of 1/15/2022  3:49 PM      START taking these medications    Details   clotrimazole (LOTRIMIN) 1 % cream Apply to affected area 2 times daily, Print         CONTINUE these medications which have NOT CHANGED    Details   albuterol (ProAir HFA) 90 mcg/act inhaler Inhale 2 puffs every 6 (six) hours as needed for wheezing, Starting Tue 11/2/2021, Normal      furosemide (LASIX) 40 mg tablet Take 2 tablets (80 mg total) by mouth every morning, Starting Wed 6/2/2021, Until Tue 10/26/2021, Normal      ipratropium-albuterol (DUO-NEB) 0 5-2 5 mg/3 mL nebulizer solution Take 3 mL by nebulization every 8 (eight) hours as needed for wheezing or shortness of breath, Starting Tue 10/26/2021, Normal      levothyroxine 150 mcg tablet Take 1 tablet (150 mcg total) by mouth every morning, Starting Wed 4/14/2021, Normal      lisinopril (ZESTRIL) 10 mg tablet Take 1 tablet (10 mg total) by mouth daily, Starting Mon 4/19/2021, Until Tue 10/26/2021, Normal      metoprolol tartrate (LOPRESSOR) 50 mg tablet Take 1 tablet (50 mg total) by mouth every 12 (twelve) hours, Starting Wed 4/14/2021, Until Tue 10/26/2021, Normal             No discharge procedures on file      PDMP Review       Value Time User    PDMP Reviewed  Yes 2/9/2020 12:16 PM Adeline Castaneda PA-C          ED Provider  Electronically Signed by           Ginger Fox DO  01/15/22 8494

## 2022-01-19 ENCOUNTER — VBI (OUTPATIENT)
Dept: FAMILY MEDICINE CLINIC | Facility: HOSPITAL | Age: 67
End: 2022-01-19

## 2022-01-19 NOTE — TELEPHONE ENCOUNTER
Samson Yan    ED Visit Information     Ed visit date: 1/15/2022  Diagnosis Description:   chest pain for 2 days now it pain is now at 7     In Network? [facility]  Discharge status: Home  Discharged with meds ? Yes  Number of ED visits to date: 1  ED Severity:NA     Outreach Information    Outreach successful: Yes 2  Date letter mailed:1/20/2022  Date Finalized:1/20/2022    Care Coordination    Follow up appointment with pcp: no None scheduled  Transportation issues ? NA    Value Base Outreach    Outreach type: 7 Day Outreach  Emergent necessity warranted by diagnosis: No  [facility] PCP: Yes  01/19/2022 02:57 PM Phone ([address]) Zula Skiff (Self) [phone] (M)   Left Message  Unable to reach patient regarding recent ED visit  01/20/2022 10:16 AM Phone ([address]) Zula Skiff (Self) [phone] (M)   Left Message  Unable to reach patient regarding recent ED visit

## 2022-01-19 NOTE — LETTER
[address] [facility] [address] [address]    Date: 01/20/22  Ayse Glass  [address] [address]    Dear Joselito Bowers: Thank you for choosing [facility] emergency department for care  As your primary care provider we want to make sure that your ongoing medical care is being addressed  If you require follow up care as a result of your emergency department visit, there are a few things we would like you to know  As part of our continuing commitment to caring for our patient, we have added more same day appointments and have extended our office hours to meet your medical needs  After hours, on-call physicians are available via our main office line  We encourage you to contact our office prior to seeking treatment to discuss your symptoms with our medical staff  Together, we can determine the correct course of action  A majority of non-emergent conditions such as: common cold, flu-like symptoms, fevers, strains/sprains, dislocations, minor burns, cuts and animal bites can be treated at [facility] facilities  Diagnostic testing is available at some sites  Of course, if you are experiencing a life threatening medical emergency call 911 or proceed directly to the nearest emergency room  Your nearest [facility] facility is conveniently located at:    [address] [place] [address] Elvirareji Sue  [address] [place] [address], [address] [place] [address] Road  [phone]  SKIP THE WAIT  Conveniently offered at most [facility] locations  Cynthiafort your spot online at www [facility] [email or web address] or on the [facility] 60    Sincerely,    Delmy Gonzalez 55 [address]  Dept: [phone]

## 2022-01-26 ENCOUNTER — TELEPHONE (OUTPATIENT)
Dept: GASTROENTEROLOGY | Facility: CLINIC | Age: 67
End: 2022-01-26

## 2022-01-28 ENCOUNTER — HOSPITAL ENCOUNTER (EMERGENCY)
Facility: HOSPITAL | Age: 67
Discharge: HOME | End: 2022-01-29
Attending: EMERGENCY MEDICINE | Admitting: EMERGENCY MEDICINE
Payer: COMMERCIAL

## 2022-01-28 ENCOUNTER — NURSE TRIAGE (OUTPATIENT)
Dept: OTHER | Facility: OTHER | Age: 67
End: 2022-01-28

## 2022-01-28 DIAGNOSIS — K40.90 INGUINAL HERNIA: ICD-10-CM

## 2022-01-28 DIAGNOSIS — Q61.02 MULTIPLE RENAL CYSTS: ICD-10-CM

## 2022-01-28 DIAGNOSIS — R10.9 ABDOMINAL PAIN: Primary | ICD-10-CM

## 2022-01-28 DIAGNOSIS — K42.9 UMBILICAL HERNIA: ICD-10-CM

## 2022-01-28 DIAGNOSIS — S39.011A STRAIN OF ABDOMINAL WALL, INITIAL ENCOUNTER: ICD-10-CM

## 2022-01-28 DIAGNOSIS — Z86.010 HISTORY OF COLON POLYPS: Primary | ICD-10-CM

## 2022-01-28 LAB
ALBUMIN SERPL BCP-MCNC: 3.5 G/DL (ref 3.5–5)
ALP SERPL-CCNC: 60 U/L (ref 46–116)
ALT SERPL W P-5'-P-CCNC: 21 U/L (ref 12–78)
ANION GAP SERPL CALCULATED.3IONS-SCNC: 7 MMOL/L (ref 4–13)
AST SERPL W P-5'-P-CCNC: 13 U/L (ref 5–45)
BASOPHILS # BLD AUTO: 0.04 THOUSANDS/ΜL (ref 0–0.1)
BASOPHILS NFR BLD AUTO: 1 % (ref 0–1)
BILIRUB SERPL-MCNC: 0.4 MG/DL (ref 0.2–1)
BUN SERPL-MCNC: 30 MG/DL (ref 5–25)
CALCIUM SERPL-MCNC: 8.6 MG/DL (ref 8.3–10.1)
CHLORIDE SERPL-SCNC: 103 MMOL/L (ref 100–108)
CO2 SERPL-SCNC: 30 MMOL/L (ref 21–32)
CREAT SERPL-MCNC: 1.85 MG/DL (ref 0.6–1.3)
EOSINOPHIL # BLD AUTO: 0.17 THOUSAND/ΜL (ref 0–0.61)
EOSINOPHIL NFR BLD AUTO: 2 % (ref 0–6)
ERYTHROCYTE [DISTWIDTH] IN BLOOD BY AUTOMATED COUNT: 13.6 % (ref 11.6–15.1)
GFR SERPL CREATININE-BSD FRML MDRD: 36 ML/MIN/1.73SQ M
GLUCOSE SERPL-MCNC: 97 MG/DL (ref 65–140)
HCT VFR BLD AUTO: 50.5 % (ref 36.5–49.3)
HGB BLD-MCNC: 14.9 G/DL (ref 12–17)
IMM GRANULOCYTES # BLD AUTO: 0.07 THOUSAND/UL (ref 0–0.2)
IMM GRANULOCYTES NFR BLD AUTO: 1 % (ref 0–2)
LACTATE SERPL-SCNC: 0.9 MMOL/L (ref 0.5–2)
LIPASE SERPL-CCNC: 85 U/L (ref 73–393)
LYMPHOCYTES # BLD AUTO: 1.62 THOUSANDS/ΜL (ref 0.6–4.47)
LYMPHOCYTES NFR BLD AUTO: 21 % (ref 14–44)
MCH RBC QN AUTO: 27.4 PG (ref 26.8–34.3)
MCHC RBC AUTO-ENTMCNC: 29.5 G/DL (ref 31.4–37.4)
MCV RBC AUTO: 93 FL (ref 82–98)
MONOCYTES # BLD AUTO: 0.84 THOUSAND/ΜL (ref 0.17–1.22)
MONOCYTES NFR BLD AUTO: 11 % (ref 4–12)
NEUTROPHILS # BLD AUTO: 5.02 THOUSANDS/ΜL (ref 1.85–7.62)
NEUTS SEG NFR BLD AUTO: 64 % (ref 43–75)
NRBC BLD AUTO-RTO: 0 /100 WBCS
PLATELET # BLD AUTO: 220 THOUSANDS/UL (ref 149–390)
PMV BLD AUTO: 12.2 FL (ref 8.9–12.7)
POTASSIUM SERPL-SCNC: 4.3 MMOL/L (ref 3.5–5.3)
PROT SERPL-MCNC: 7.1 G/DL (ref 6.4–8.2)
RBC # BLD AUTO: 5.43 MILLION/UL (ref 3.88–5.62)
SODIUM SERPL-SCNC: 140 MMOL/L (ref 136–145)
WBC # BLD AUTO: 7.76 THOUSAND/UL (ref 4.31–10.16)

## 2022-01-28 PROCEDURE — 80053 COMPREHEN METABOLIC PANEL: CPT | Performed by: EMERGENCY MEDICINE

## 2022-01-28 PROCEDURE — 99284 EMERGENCY DEPT VISIT MOD MDM: CPT | Performed by: EMERGENCY MEDICINE

## 2022-01-28 PROCEDURE — 36415 COLL VENOUS BLD VENIPUNCTURE: CPT | Performed by: EMERGENCY MEDICINE

## 2022-01-28 PROCEDURE — 85025 COMPLETE CBC W/AUTO DIFF WBC: CPT | Performed by: EMERGENCY MEDICINE

## 2022-01-28 PROCEDURE — 83605 ASSAY OF LACTIC ACID: CPT | Performed by: EMERGENCY MEDICINE

## 2022-01-28 PROCEDURE — 83690 ASSAY OF LIPASE: CPT | Performed by: EMERGENCY MEDICINE

## 2022-01-28 PROCEDURE — 99284 EMERGENCY DEPT VISIT MOD MDM: CPT

## 2022-01-28 PROCEDURE — 96360 HYDRATION IV INFUSION INIT: CPT

## 2022-01-28 RX ORDER — SODIUM PICOSULFATE, MAGNESIUM OXIDE, AND ANHYDROUS CITRIC ACID 10; 3.5; 12 MG/160ML; G/160ML; G/160ML
LIQUID ORAL
Qty: 320 ML | Refills: 0 | Status: SHIPPED | COMMUNITY
Start: 2022-01-28 | End: 2022-02-02 | Stop reason: HOSPADM

## 2022-01-28 RX ADMIN — SODIUM CHLORIDE 1000 ML: 0.9 INJECTION, SOLUTION INTRAVENOUS at 22:04

## 2022-01-29 ENCOUNTER — APPOINTMENT (EMERGENCY)
Dept: CT IMAGING | Facility: HOSPITAL | Age: 67
End: 2022-01-29
Payer: COMMERCIAL

## 2022-01-29 VITALS
OXYGEN SATURATION: 92 % | TEMPERATURE: 97.6 F | WEIGHT: 285 LBS | HEIGHT: 71 IN | HEART RATE: 64 BPM | SYSTOLIC BLOOD PRESSURE: 123 MMHG | DIASTOLIC BLOOD PRESSURE: 68 MMHG | RESPIRATION RATE: 18 BRPM | BODY MASS INDEX: 39.9 KG/M2

## 2022-01-29 LAB
BILIRUB UR QL STRIP: NEGATIVE
CLARITY UR: CLEAR
COLOR UR: YELLOW
GLUCOSE UR STRIP-MCNC: NEGATIVE MG/DL
HGB UR QL STRIP.AUTO: NEGATIVE
KETONES UR STRIP-MCNC: NEGATIVE MG/DL
LEUKOCYTE ESTERASE UR QL STRIP: NEGATIVE
NITRITE UR QL STRIP: NEGATIVE
PH UR STRIP.AUTO: 6.5 [PH]
PROT UR STRIP-MCNC: NEGATIVE MG/DL
SP GR UR STRIP.AUTO: 1.01 (ref 1–1.03)
UROBILINOGEN UR QL STRIP.AUTO: 0.2 E.U./DL

## 2022-01-29 PROCEDURE — 74176 CT ABD & PELVIS W/O CONTRAST: CPT

## 2022-01-29 PROCEDURE — 81003 URINALYSIS AUTO W/O SCOPE: CPT | Performed by: EMERGENCY MEDICINE

## 2022-01-29 PROCEDURE — G1004 CDSM NDSC: HCPCS

## 2022-01-29 NOTE — TELEPHONE ENCOUNTER
Regarding: Pain in groin area   ----- Message from Uzma Rasmussen sent at 1/28/2022  7:06 PM EST -----  '' I am having pain in my groin area concerned ''

## 2022-01-29 NOTE — TELEPHONE ENCOUNTER
Reason for Disposition   [1] SEVERE pain AND [2] age > 60 years    Answer Assessment - Initial Assessment Questions  1  LOCATION: "Where does it hurt?"       Lower mid abdominal pain  2  RADIATION: "Does the pain shoot anywhere else?" (e g , chest, back)      Denies  3  ONSET: "When did the pain begin?" (Minutes, hours or days ago)       1/21/21  4  SUDDEN: "Gradual or sudden onset?"      Gradual   5  PATTERN "Does the pain come and go, or is it constant?"     - If constant: "Is it getting better, staying the same, or worsening?"       (Note: Constant means the pain never goes away completely; most serious pain is constant and it progresses)      - If intermittent: "How long does it last?" "Do you have pain now?"      (Note: Intermittent means the pain goes away completely between bouts)      Constant  6  SEVERITY: "How bad is the pain?"  (e g , Scale 1-10; mild, moderate, or severe)     - MILD (1-3): doesn't interfere with normal activities, abdomen soft and not tender to touch      - MODERATE (4-7): interferes with normal activities or awakens from sleep, tender to touch      - SEVERE (8-10): excruciating pain, doubled over, unable to do any normal activities        Rated 10/10 with ambulation  Resting 1/10  7  RECURRENT SYMPTOM: "Have you ever had this type of stomach pain before?" If Yes, ask: "When was the last time?" and "What happened that time?"       Confirms with prior hernia  8  CAUSE: "What do you think is causing the stomach pain?"     Hernia  9  RELIEVING/AGGRAVATING FACTORS: "What makes it better or worse?" (e g , movement, antacids, bowel movement)      Ambulation and coughing makes it worse, and resting relieves  10   OTHER SYMPTOMS: "Has there been any vomiting, diarrhea, constipation, or urine problems?"        Cough    Protocols used: ABDOMINAL PAIN - MALE-ADULT-

## 2022-01-29 NOTE — ED PROVIDER NOTES
History  Chief Complaint   Patient presents with    Abdominal Pain     started a week ago, seen here in ED, no change in pain     Patient with pmh chronic etoh use, obesity, tinea cruris, inguinal hernia repair, lysis of abd adhesions, nephrectomy, presents with concern for persistent lower abd pain feels muscular worse with movement  Patient states its been painful for about 3-4 weeks, gradually getting worse  He tried topical clotrimazole cream that is not helping  Prior to Admission Medications   Prescriptions Last Dose Informant Patient Reported? Taking? Sod Picosulfate-Mag Ox-Cit Acd (Clenpiq) 10-3 5-12 MG-GM -GM/160ML SOLN   No No   Sig: Take as directed     albuterol (ProAir HFA) 90 mcg/act inhaler   No No   Sig: Inhale 2 puffs every 6 (six) hours as needed for wheezing   clotrimazole (LOTRIMIN) 1 % cream   No No   Sig: Apply to affected area 2 times daily   furosemide (LASIX) 40 mg tablet   No No   Sig: Take 2 tablets (80 mg total) by mouth every morning   ipratropium-albuterol (DUO-NEB) 0 5-2 5 mg/3 mL nebulizer solution   No No   Sig: Take 3 mL by nebulization every 8 (eight) hours as needed for wheezing or shortness of breath   levothyroxine 150 mcg tablet  Self No No   Sig: Take 1 tablet (150 mcg total) by mouth every morning   lisinopril (ZESTRIL) 10 mg tablet  Self No No   Sig: Take 1 tablet (10 mg total) by mouth daily   metoprolol tartrate (LOPRESSOR) 50 mg tablet  Self No No   Sig: Take 1 tablet (50 mg total) by mouth every 12 (twelve) hours      Facility-Administered Medications: None       Past Medical History:   Diagnosis Date    Cellulitis, trunk     CKD (chronic kidney disease), stage III (HCC)     CPAP (continuous positive airway pressure) dependence     Depression, recurrent ([facility] [address] ) 10/26/2021    Disease of thyroid gland     Gastroenteritis and colitis, viral     last assessed: 11/24/2015    Hernia     Hypertension     Lung mass     HUNG on CPAP     Renal mass        Past Surgical History:   Procedure Laterality Date    ABDOMINAL ADHESION SURGERY N/A 2016    Procedure: LYSIS ADHESIONS;  Surgeon: Kaden Murrell MD;  [facility];  Service:     INGUINAL HERNIA REPAIR      LYMPH NODE DISSECTION N/A 2016    Procedure: DISSECTION/STAGING LYMPH NODE PELVIS/ABDOMEN;  Surgeon: Kaden Murrell MD;  [facility];  Service:     NEPHRECTOMY Right     patients believes it was the Right kidney    [place] LAP,PARTIAL NEPHRECTOMY Right 2016    Procedure: NEPHRECTOMY PARTIAL LAPAROSCOPIC W ROBOTICS, intra op ultrasound,laparoscopic lysis of adhesions;  Surgeon: Kaden Murrell MD;  [facility];  Service: Urology       Family History   Problem Relation Age of Onset    Lung cancer Father     Melanoma Father     Other Father         renal insufficiency    Dementia Mother     Mental illness Neg Hx     Substance Abuse Neg Hx     Colon polyps Neg Hx     Colon cancer Neg Hx      I have reviewed and agree with the history as documented  E-Cigarette/Vaping    E-Cigarette Use Former User      E-Cigarette/Vaping Substances    Nicotine No     THC No     CBD No     Flavoring No     Other No     Unknown No      Social History     Tobacco Use    Smoking status: Former Smoker     Packs/day: 1      Years: 30      Pack years: 30      Quit date: 2010     Years since quittin 4    Smokeless tobacco: Former User    Tobacco comment: not in last 12 months  quit smoking arnd 2013, used to smoke about 1 pack per week   Vaping Use    Vaping Use: Former   Substance Use Topics    Alcohol use: Yes     Comment: social  ocasional    Drug use: No       Review of Systems   Constitutional: Negative for chills and fever  HENT: Negative for rhinorrhea and sore throat  Respiratory: Negative for shortness of breath  Cardiovascular: Negative for chest pain  Gastrointestinal: Positive for abdominal pain   Negative for constipation, diarrhea, nausea and vomiting  Genitourinary: Negative for dysuria and frequency  Skin: Positive for rash  All other systems reviewed and are negative  Physical Exam  Physical Exam  Vitals and nursing note reviewed  Constitutional:       Appearance: He is well-developed  HENT:      Head: Normocephalic and atraumatic  Right Ear: External ear normal       Left Ear: External ear normal       Nose: Nose normal    Eyes:      Conjunctiva/sclera: Conjunctivae normal       Pupils: Pupils are equal, round, and reactive to light  Cardiovascular:      Rate and Rhythm: Normal rate and regular rhythm  Heart sounds: Normal heart sounds  Pulmonary:      Effort: Pulmonary effort is normal  No respiratory distress  Breath sounds: Normal breath sounds  No wheezing  Abdominal:      General: Abdomen is protuberant  Bowel sounds are decreased  There is no distension  Palpations: Abdomen is soft  There is no fluid wave  Tenderness: There is abdominal tenderness in the suprapubic area  Hernia: A hernia is present  Hernia is present in the umbilical area  There is no hernia in the left inguinal area or right inguinal area  Genitourinary:     Penis: Normal        Testes: Normal          Right: Tenderness not present  Left: Tenderness not present  Musculoskeletal:         General: No deformity  Normal range of motion  Cervical back: Normal range of motion and neck supple  No spinous process tenderness  Skin:     General: Skin is warm and dry  Findings: No rash  Neurological:      General: No focal deficit present  Mental Status: He is alert  GCS: GCS eye subscore is 4  GCS verbal subscore is 5  GCS motor subscore is 6  Sensory: No sensory deficit     Psychiatric:         Mood and Affect: Mood normal          Vital Signs  ED Triage Vitals [01/28/22 2114]   Temperature Pulse Respirations Blood Pressure SpO2   97 6 °F (36 4 °C) 80 18 155/79 95 %      Temp Source Heart Rate Source Patient Position - Orthostatic VS BP Location FiO2 (%)   Temporal Monitor Lying Right arm --      Pain Score       --           Vitals:    01/28/22 2114 01/28/22 2115 01/28/22 2300   BP: 155/79 155/79    Pulse: 80 87 74   Patient Position - Orthostatic VS: Lying           Visual Acuity      ED Medications  Medications   sodium chloride 0 9 % bolus 1,000 mL (0 mL Intravenous Stopped 1/28/22 2304)       Diagnostic Studies  Results Reviewed     Procedure Component Value Units Date/Time    UA w Reflex to Microscopic w Reflex to Culture [[id number]] Collected: 01/29/22 0111    Lab Status: Final result Specimen: Urine, Clean Catch Updated: 01/29/22 0119     Color, UA Yellow     Clarity, UA Clear     Specific Gravity, UA 1 015     pH, UA 6 5     Leukocytes, UA Negative     Nitrite, UA Negative     Protein, UA Negative mg/dl      Glucose, UA Negative mg/dl      Ketones, UA Negative mg/dl      Urobilinogen, UA 0 2 E U /dl      Bilirubin, UA Negative     Blood, UA Negative    Lactic acid, plasma [[id number]]  (Normal) Collected: 01/28/22 2202    Lab Status: Final result Specimen: Blood from Arm, Left Updated: 01/28/22 2235     LACTIC ACID 0 9 mmol/L     Narrative:      Result may be elevated if tourniquet was used during collection      CMP [[id number]]  (Abnormal) Collected: 01/28/22 2202    Lab Status: Final result Specimen: Blood from Arm, Left Updated: 01/28/22 2233     Sodium 140 mmol/L      Potassium 4 3 mmol/L      Chloride 103 mmol/L      CO2 30 mmol/L      ANION GAP 7 mmol/L      BUN 30 mg/dL      Creatinine 1 85 mg/dL      Glucose 97 mg/dL      Calcium 8 6 mg/dL      AST 13 U/L      ALT 21 U/L      Alkaline Phosphatase 60 U/L      Total Protein 7 1 g/dL      Albumin 3 5 g/dL      Total Bilirubin 0 40 mg/dL      eGFR 36 ml/min/1 73sq m     Narrative:      [place] guidelines for Chronic Kidney Disease (CKD):     Stage 1 with normal or high GFR (GFR > 90 mL/min/1 73 square meters)   Stage 2 Mild CKD (GFR = 60-89 mL/min/1 73 square meters)    Stage 3A Moderate CKD (GFR = 45-59 mL/min/1 73 square meters)    Stage 3B Moderate CKD (GFR = 30-44 mL/min/1 73 square meters)    Stage 4 Severe CKD (GFR = 15-29 mL/min/1 73 square meters)    Stage 5 End Stage CKD (GFR <15 mL/min/1 73 square meters)  Note: GFR calculation is accurate only with a steady state creatinine    Lipase [[id number]]  (Normal) Collected: 01/28/22 2202    Lab Status: Final result Specimen: Blood from Arm, Left Updated: 01/28/22 2233     Lipase 85 u/L     CBC and differential [[id number]]  (Abnormal) Collected: 01/28/22 2202    Lab Status: Final result Specimen: Blood from Arm, Left Updated: 01/28/22 2211     WBC 7 76 Thousand/uL      RBC 5 43 Million/uL      Hemoglobin 14 9 g/dL      Hematocrit 50 5 %      MCV 93 fL      MCH 27 4 pg      MCHC 29 5 g/dL      RDW 13 6 %      MPV 12 2 fL      Platelets 182 Thousands/uL      nRBC 0 /100 WBCs      Neutrophils Relative 64 %      Immat GRANS % 1 %      Lymphocytes Relative 21 %      Monocytes Relative 11 %      Eosinophils Relative 2 %      Basophils Relative 1 %      Neutrophils Absolute 5 02 Thousands/µL      Immature Grans Absolute 0 07 Thousand/uL      Lymphocytes Absolute 1 62 Thousands/µL      Monocytes Absolute 0 84 Thousand/µL      Eosinophils Absolute 0 17 Thousand/µL      Basophils Absolute 0 04 Thousands/µL                  CT abdomen pelvis wo contrast   Final Result by Gael Almanza MD (01/29 0117)      No acute intra-abdominal abnormality  No free air or free fluid  Stable postoperative changes of prior right nephrectomy  Unchanged appearance of the left kidney which contains multiple incompletely evaluated simple and complex cysts     A nonemergent renal ultrasound could be performed for further characterization  Stable scattered hepatic cysts              Workstation performed: [id number]                    Procedures  Procedures         ED Course  ED Course as of 01/29/22 0129   Sat Jan 29, 2022   0050 Sign out to [employer] - inguinal, suprapubic abd pain feels muscular for a few weeks - already evaluated in ER 1 week ago and using antifungal cream for tinea cruris  Labs chronic renal disease - imaging pending as patient has hx of abd surgeries  If ct nl, home with primary care follow, Tylenol as needed  SBIRT 22yo+      Most Recent Value   SBIRT (22 yo +)    In order to provide better care to our patients, we are screening all of our patients for alcohol and drug use  Would it be okay to ask you these screening questions? Yes Filed at: 01/28/2022 2125   Initial Alcohol Screen: US AUDIT-C     1  How often do you have a drink containing alcohol? 0 Filed at: 01/28/2022 2125   2  How many drinks containing alcohol do you have on a typical day you are drinking? 0 Filed at: 01/28/2022 2125   3a  Male UNDER 65: How often do you have five or more drinks on one occasion? 0 Filed at: 01/28/2022 2125   3b  FEMALE Any Age, or MALE 65+: How often do you have 4 or more drinks on one occassion? 0 Filed at: 01/28/2022 2125   Audit-C Score 0 Filed at: 01/28/2022 2125   TONA: How many times in the past year have you    Used an illegal drug or used a prescription medication for non-medical reasons?  Never Filed at: 01/28/2022 2125                    MDM  Number of Diagnoses or Management Options  Abdominal pain: new and requires workup  Inguinal hernia: new and requires workup  Multiple renal cysts: new and requires workup  Strain of abdominal wall, initial encounter: new and requires workup  Umbilical hernia: new and requires workup     Amount and/or Complexity of Data Reviewed  Clinical lab tests: reviewed and ordered  Tests in the radiology section of CPT®: ordered  Discuss the patient with other providers: yes    Patient Progress  Patient progress: improved      Disposition  Final diagnoses:   Abdominal pain   Strain of abdominal wall, initial encounter   Multiple renal cysts   Inguinal hernia   Umbilical hernia     Time reflects when diagnosis was documented in both MDM as applicable and the Disposition within this note     Time User Action Codes Description Comment    1/29/2022  1:25 AM Toshia Critchley S Add [R10 9] Abdominal pain     1/29/2022  1:25 AM Toshia Critchley S Add [S39 011A] Strain of abdominal wall, initial encounter     1/29/2022  1:26 AM Concepcion Floyd Add [Q61 02] Multiple renal cysts     1/29/2022  1:26 AM Toshia Critchley S Add [K40 90] Inguinal hernia     1/29/2022  1:26 AM Concepcion Floyd Add [W69 2] Umbilical hernia       ED Disposition     ED Disposition Condition Date/Time Comment    Discharge Stable Sat Jan 29, 2022  1:25 AM Gavin Alevism discharge to home/self care  Follow-up Information     Follow up With Specialties Details Why Contact Info Additional Information    Tori Samuel MD Internal Medicine Schedule an appointment as soon as possible for a visit   YOVANY Canseco 1 [phone]        [address] Emergency Department Emergency Medicine  If symptoms worsen 100 [place], [address]  [address] [facility] Emergency Department, 600 [facility] Surgery Schedule an appointment as soon as possible for a visit   [address] [facility] [id number]  [place] [address] [place] [address], [address]          Patient's Medications   Discharge Prescriptions    No medications on file       No discharge procedures on file      PDMP Review       Value Time User    PDMP Reviewed  Yes 2/9/2020 12:16 PM José Manuel Jain PA-C          ED Provider  Electronically Signed by           Alberta Posada DO  01/29/22 0129

## 2022-01-29 NOTE — ED CARE HANDOFF
Emergency Department Sign Out Note        Sign out and transfer of care from Dr Tonio Pennington  See Separate Emergency Department note  The patient, Beena Hendrickson, was evaluated by the previous provider for abd pain  Workup Completed:  CT abdomen pelvis wo contrast   Final Result      No acute intra-abdominal abnormality  No free air or free fluid  Stable postoperative changes of prior right nephrectomy  Unchanged appearance of the left kidney which contains multiple incompletely evaluated simple and complex cysts     A nonemergent renal ultrasound could be performed for further characterization  Stable scattered hepatic cysts              Workstation performed: [id number]            Labs Reviewed   CBC AND DIFFERENTIAL - Abnormal       Result Value Ref Range Status    WBC 7 76  4 31 - 10 16 Thousand/uL Final    RBC 5 43  3 88 - 5 62 Million/uL Final    Hemoglobin 14 9  12 0 - 17 0 g/dL Final    Hematocrit 50 5 (*) 36 5 - 49 3 % Final    MCV 93  82 - 98 fL Final    MCH 27 4  26 8 - 34 3 pg Final    MCHC 29 5 (*) 31 4 - 37 4 g/dL Final    RDW 13 6  11 6 - 15 1 % Final    MPV 12 2  8 9 - 12 7 fL Final    Platelets 729  334 - 390 Thousands/uL Final    nRBC 0  /100 WBCs Final    Neutrophils Relative 64  43 - 75 % Final    Immat GRANS % 1  0 - 2 % Final    Lymphocytes Relative 21  14 - 44 % Final    Monocytes Relative 11  4 - 12 % Final    Eosinophils Relative 2  0 - 6 % Final    Basophils Relative 1  0 - 1 % Final    Neutrophils Absolute 5 02  1 85 - 7 62 Thousands/µL Final    Immature Grans Absolute 0 07  0 00 - 0 20 Thousand/uL Final    Lymphocytes Absolute 1 62  0 60 - 4 47 Thousands/µL Final    Monocytes Absolute 0 84  0 17 - 1 22 Thousand/µL Final    Eosinophils Absolute 0 17  0 00 - 0 61 Thousand/µL Final    Basophils Absolute 0 04  0 00 - 0 10 Thousands/µL Final   COMPREHENSIVE METABOLIC PANEL - Abnormal    Sodium 140  136 - 145 mmol/L Final    Potassium 4 3  3 5 - 5 3 mmol/L Final    Chloride 103  100 - 108 mmol/L Final    CO2 30  21 - 32 mmol/L Final    ANION GAP 7  4 - 13 mmol/L Final    BUN 30 (*) 5 - 25 mg/dL Final    Creatinine 1 85 (*) 0 60 - 1 30 mg/dL Final    Comment: Standardized to IDMS reference method    Glucose 97  65 - 140 mg/dL Final    Comment: If the patient is fasting, the ADA then defines impaired fasting glucose as > 100 mg/dL and diabetes as > or equal to 123 mg/dL  Specimen collection should occur prior to Sulfasalazine administration due to the potential for falsely depressed results  Specimen collection should occur prior to Sulfapyridine administration due to the potential for falsely elevated results  Calcium 8 6  8 3 - 10 1 mg/dL Final    AST 13  5 - 45 U/L Final    Comment: Specimen collection should occur prior to Sulfasalazine administration due to the potential for falsely depressed results  ALT 21  12 - 78 U/L Final    Comment: Specimen collection should occur prior to Sulfasalazine administration due to the potential for falsely depressed results  Alkaline Phosphatase 60  46 - 116 U/L Final    Total Protein 7 1  6 4 - 8 2 g/dL Final    Albumin 3 5  3 5 - 5 0 g/dL Final    Total Bilirubin 0 40  0 20 - 1 00 mg/dL Final    Comment: Use of this assay is not recommended for patients undergoing treatment with eltrombopag due to the potential for falsely elevated results      eGFR 36  ml/min/1 73sq m Final    Narrative:     [place] guidelines for Chronic Kidney Disease (CKD):     Stage 1 with normal or high GFR (GFR > 90 mL/min/1 73 square meters)    Stage 2 Mild CKD (GFR = 60-89 mL/min/1 73 square meters)    Stage 3A Moderate CKD (GFR = 45-59 mL/min/1 73 square meters)    Stage 3B Moderate CKD (GFR = 30-44 mL/min/1 73 square meters)    Stage 4 Severe CKD (GFR = 15-29 mL/min/1 73 square meters)    Stage 5 End Stage CKD (GFR <15 mL/min/1 73 square meters)  Note: GFR calculation is accurate only with a steady state creatinine   LIPASE - Normal Lipase 85  73 - 393 u/L Final   LACTIC ACID, PLASMA - Normal    LACTIC ACID 0 9  0 5 - 2 0 mmol/L Final    Narrative:     Result may be elevated if tourniquet was used during collection  UA W REFLEX TO MICROSCOPIC WITH REFLEX TO CULTURE    Color, UA Yellow   Final    Clarity, UA Clear   Final    Specific [place], UA 1 015  1 003 - 1 030 Final    pH, UA 6 5  4 5, 5 0, 5 5, 6 0, 6 5, 7 0, 7 5, 8 0 Final    Leukocytes, UA Negative  Negative Final    Nitrite, UA Negative  Negative Final    Protein, UA Negative  Negative mg/dl Final    Glucose, UA Negative  Negative mg/dl Final    Ketones, UA Negative  Negative mg/dl Final    Urobilinogen, UA 0 2  0 2, 1 0 E U /dl E U /dl Final    Bilirubin, UA Negative  Negative Final    Blood, UA Negative  Negative Final        ED Course / Workup Pending (followup):                                    ED Course as of 01/29/22 0127   Sat Jan 29, 2022   0029 S/o from Dr Jacob Lopez  Likely abd muscle strain  Labs ok  CT ordered  If neg, can d/c home   0126 CT non-acute  Will d/c home  Supportive care, avoid heavy lifting/straining  F/u with PCP and gen surg  RTED if sx worsen       Procedures  MDM  Number of Diagnoses or Management Options  Abdominal pain: new and requires workup  Inguinal hernia: new and requires workup  Multiple renal cysts: new and requires workup  Strain of abdominal wall, initial encounter: new and requires workup  Umbilical hernia: new and requires workup     Amount and/or Complexity of Data Reviewed  Clinical lab tests: reviewed  Tests in the radiology section of CPT®: reviewed  Tests in the medicine section of CPT®: reviewed  Discussion of test results with the performing providers: yes  Review and summarize past medical records: yes    Risk of Complications, Morbidity, and/or Mortality  Presenting problems: moderate  Diagnostic procedures: low  Management options: low    Patient Progress  Patient progress: stable          Disposition  Final diagnoses:   Abdominal pain   Strain of abdominal wall, initial encounter   Multiple renal cysts   Inguinal hernia   Umbilical hernia     Time reflects when diagnosis was documented in both MDM as applicable and the Disposition within this note     Time User Action Codes Description Comment    1/29/2022  1:25 AM [place] S Add [R10 9] Abdominal pain     1/29/2022  1:25 [facility] [S39 011A] Strain of abdominal wall, initial encounter     1/29/2022  1:26 AM Ellin Hun Add [Q61 02] Multiple renal cysts     1/29/2022  1:26 AM SaludFÃCIL S Add [K40 90] Inguinal hernia     1/29/2022  1:26 AM Ellin Hun Add [N70 4] Umbilical hernia       ED Disposition     ED Disposition Condition Date/Time Comment    Discharge Stable Sat Jan 29, 2022  1:25 AM Olivia  discharge to home/self care  Follow-up Information     Follow up With Specialties Details Why Contact Info Additional Information    Mayela Salas MD Internal Medicine Schedule an appointment as soon as possible for a visit   YOVANY Canseco 1 [address] [facility]        [place] [address] Emergency Department Emergency Medicine  If symptoms worsen [address] [address]  [address] [facility] Emergency Department, [address] [place] [address] Surgery Schedule an appointment as soon as possible for a visit   [address] [place] [phone]  [place] [address], [address] [facility] [address]        Patient's Medications   Discharge Prescriptions    No medications on file     No discharge procedures on file         ED Provider  Electronically Signed by     Dell Khan MD  01/29/22 2368

## 2022-01-29 NOTE — TELEPHONE ENCOUNTER
c/o constant lower mid abdominal wall pain (non radiating) onset 1/15, and ranging in intensity (10/10 with ambulation/coughing and 1/10 with rest)  No changes in BM or Urination  Seen in ED on 1/15, but discharged with no significant findings  Pain persists  Unable to visualized pain location for any bulge/mass, but reports no palpable abnormality noted  No additional symptoms reported  Patient scheduled for  colonoscopy on Wednesday, with prep to start on Monday  On call provider contacted and informed of patients concerns and status  Provider recommends proceeding to ED for evaluation r/t pain 10/10  Repeat CT  If results negative OK to proceed with colonoscopy  Patient informed of providers recommendation  Verbalized understanding and agreeable with disposition  No further questions

## 2022-01-31 NOTE — TELEPHONE ENCOUNTER
Patient presented to [address] [place] [address] ED 1/28/22  CT A/P unrevealing  Patient is scheduled for Colonoscopy 2/2/22 with Dr Medina Cloud

## 2022-02-01 ENCOUNTER — TELEPHONE (OUTPATIENT)
Dept: SURGERY | Facility: HOSPITAL | Age: 67
End: 2022-02-01

## 2022-02-01 ENCOUNTER — NURSE TRIAGE (OUTPATIENT)
Dept: OTHER | Facility: OTHER | Age: 67
End: 2022-02-01

## 2022-02-01 ENCOUNTER — TELEPHONE (OUTPATIENT)
Dept: GASTROENTEROLOGY | Facility: CLINIC | Age: 67
End: 2022-02-01

## 2022-02-01 ENCOUNTER — TELEPHONE (OUTPATIENT)
Dept: OTHER | Facility: OTHER | Age: 67
End: 2022-02-01

## 2022-02-01 NOTE — TELEPHONE ENCOUNTER
Regarding: Question regarding colonoscopy prep  ----- Message from Kamryn West sent at 2/1/2022  5:56 PM EST -----  '' I have a question regarding my colonoscopy prep ''

## 2022-02-02 ENCOUNTER — TELEPHONE (OUTPATIENT)
Dept: SURGERY | Facility: HOSPITAL | Age: 67
End: 2022-02-02

## 2022-02-02 ENCOUNTER — NURSE TRIAGE (OUTPATIENT)
Dept: OTHER | Facility: OTHER | Age: 67
End: 2022-02-02

## 2022-02-02 ENCOUNTER — ANESTHESIA EVENT (OUTPATIENT)
Dept: GASTROENTEROLOGY | Facility: HOSPITAL | Age: 67
End: 2022-02-02

## 2022-02-02 ENCOUNTER — ANESTHESIA (OUTPATIENT)
Dept: GASTROENTEROLOGY | Facility: HOSPITAL | Age: 67
End: 2022-02-02

## 2022-02-02 ENCOUNTER — HOSPITAL ENCOUNTER (OUTPATIENT)
Dept: GASTROENTEROLOGY | Facility: HOSPITAL | Age: 67
Setting detail: OUTPATIENT SURGERY
Discharge: HOME | End: 2022-02-02
Attending: INTERNAL MEDICINE | Admitting: INTERNAL MEDICINE
Payer: COMMERCIAL

## 2022-02-02 VITALS
DIASTOLIC BLOOD PRESSURE: 72 MMHG | TEMPERATURE: 98 F | RESPIRATION RATE: 18 BRPM | SYSTOLIC BLOOD PRESSURE: 138 MMHG | HEART RATE: 78 BPM | OXYGEN SATURATION: 95 %

## 2022-02-02 DIAGNOSIS — Z86.010 PERSONAL HISTORY OF COLONIC POLYPS: ICD-10-CM

## 2022-02-02 PROBLEM — T88.4XXA DIFFICULT INTUBATION: Status: ACTIVE | Noted: 2022-02-02

## 2022-02-02 PROCEDURE — 88305 TISSUE EXAM BY PATHOLOGIST: CPT | Performed by: PATHOLOGY

## 2022-02-02 PROCEDURE — 45380 COLONOSCOPY AND BIOPSY: CPT | Performed by: INTERNAL MEDICINE

## 2022-02-02 PROCEDURE — 45385 COLONOSCOPY W/LESION REMOVAL: CPT | Performed by: INTERNAL MEDICINE

## 2022-02-02 RX ORDER — LIDOCAINE HYDROCHLORIDE 10 MG/ML
INJECTION, SOLUTION EPIDURAL; INFILTRATION; INTRACAUDAL; PERINEURAL AS NEEDED
Status: DISCONTINUED | OUTPATIENT
Start: 2022-02-02 | End: 2022-02-02

## 2022-02-02 RX ORDER — SODIUM CHLORIDE 9 MG/ML
INJECTION, SOLUTION INTRAVENOUS CONTINUOUS PRN
Status: DISCONTINUED | OUTPATIENT
Start: 2022-02-02 | End: 2022-02-02

## 2022-02-02 RX ORDER — PROPOFOL 10 MG/ML
INJECTION, EMULSION INTRAVENOUS CONTINUOUS PRN
Status: DISCONTINUED | OUTPATIENT
Start: 2022-02-02 | End: 2022-02-02

## 2022-02-02 RX ORDER — PROPOFOL 10 MG/ML
INJECTION, EMULSION INTRAVENOUS AS NEEDED
Status: DISCONTINUED | OUTPATIENT
Start: 2022-02-02 | End: 2022-02-02

## 2022-02-02 RX ADMIN — SODIUM CHLORIDE: 0.9 INJECTION, SOLUTION INTRAVENOUS at 09:45

## 2022-02-02 RX ADMIN — PROPOFOL 80 MCG/KG/MIN: 10 INJECTION, EMULSION INTRAVENOUS at 09:51

## 2022-02-02 RX ADMIN — LIDOCAINE HYDROCHLORIDE 50 MG: 10 INJECTION, SOLUTION EPIDURAL; INFILTRATION; INTRACAUDAL; PERINEURAL at 09:51

## 2022-02-02 RX ADMIN — PROPOFOL 130 MG: 10 INJECTION, EMULSION INTRAVENOUS at 09:51

## 2022-02-02 NOTE — TELEPHONE ENCOUNTER
Reason for Disposition   Health Information question, no triage required and triager able to answer question    Answer Assessment - Initial Assessment Questions  1   REASON FOR CALL or QUESTION: "What is your reason for calling today?" or "How can I best help you?" or "What question do you have that I can help answer?"      Wanted to clarify prep instructions    Protocols used: INFORMATION ONLY CALL - NO TRIAGE-ADULT-

## 2022-02-02 NOTE — TELEPHONE ENCOUNTER
Regarding: Colonoscopy Question  ----- Message from [facility] sent at 2/2/2022 12:17 AM EST -----  ''I have a question regarding my colonoscopy prep   My procedure is 0900 ''

## 2022-02-02 NOTE — ANESTHESIA PREPROCEDURE EVALUATION
Procedure:  COLONOSCOPY    Relevant Problems   ANESTHESIA   (+) Difficult intubation      CARDIO   (+) Dyspnea on exertion   (+) Essential hypertension      ENDO   (+) Other specified hypothyroidism      /RENAL   (+) Stage 3b chronic kidney disease      NEURO/PSYCH   (+) Depression, recurrent (HCC)      PULMONARY   (+) Dyspnea on exertion   (+) Mild obstructive sleep apnea        Physical Exam    Airway    Mallampati score: III  TM Distance: >3 FB  Neck ROM: limited     Dental   No notable dental hx     Cardiovascular  Cardiovascular exam normal    Pulmonary  Pulmonary exam normal     Other Findings        Anesthesia Plan  ASA Score- 3     Anesthesia Type- IV sedation with anesthesia with ASA Monitors  Additional Monitors:   Airway Plan:           Plan Factors-    Chart reviewed  Patient summary reviewed  Patient is not a current smoker  Induction- intravenous  Postoperative Plan-     Informed Consent- Anesthetic plan and risks discussed with patient  I personally reviewed this patient with the CRNA  Discussed and agreed on the Anesthesia Plan with the CRNA  Elise Molina

## 2022-02-02 NOTE — H&P
History and Physical - [facility]  Collette Coma 78 y o  male MRN: [id number]    HPI: Collette Coma is a 78y o  year old male who presents for  Personal history of colon polyps    REVIEW OF SYSTEMS: Per the HPI, and otherwise unremarkable      Historical Information   Past Medical History:   Diagnosis Date    Cellulitis, trunk     CKD (chronic kidney disease), stage III (HCC)     CPAP (continuous positive airway pressure) dependence     Depression, recurrent ([address] ) 10/26/2021    Disease of thyroid gland     Gastroenteritis and colitis, viral     last assessed: 2015    Hernia     Hypertension     Lung mass     HUNG on CPAP     Renal mass      Past Surgical History:   Procedure Laterality Date    ABDOMINAL ADHESION SURGERY N/A 2016    Procedure: LYSIS ADHESIONS;  Surgeon: Cornel Richardson MD;  [facility]: [facility];  Service:     INGUINAL HERNIA REPAIR      LYMPH NODE DISSECTION N/A 2016    Procedure: DISSECTION/STAGING LYMPH NODE PELVIS/ABDOMEN;  Surgeon: Cornel Richardson MD;  [facility];  Service:     NEPHRECTOMY Right     patients believes it was the Right kidney    VA LAP,PARTIAL NEPHRECTOMY Right 2016    Procedure: NEPHRECTOMY PARTIAL LAPAROSCOPIC W ROBOTICS, intra op ultrasound,laparoscopic lysis of adhesions;  Surgeon: Cornel Richardson MD;  Location: [facility];  Service: Urology     Social History   Social History     Substance and Sexual Activity   Alcohol Use Yes    Comment: social  ocasional     Social History     Substance and Sexual Activity   Drug Use No     Social History     Tobacco Use   Smoking Status Former Smoker    Packs/day: 1 00    Years: 30 00    Pack years: 30 00    Quit date: 2010    Years since quittin 4   Smokeless Tobacco Former User   Tobacco Comment    not in last 12 months  quit smoking arnd 2013, used to smoke about 1 pack per week     Family History   Problem Relation Age of Onset    Lung cancer Father    [facility] Melanoma Father     Other Father         renal insufficiency    Dementia Mother     Mental illness Neg Hx     Substance Abuse Neg Hx     Colon polyps Neg Hx     Colon cancer Neg Hx        Meds/Allergies       Current Outpatient Medications:     albuterol (ProAir HFA) 90 mcg/act inhaler    clotrimazole (LOTRIMIN) 1 % cream    furosemide (LASIX) 40 mg tablet    ipratropium-albuterol (DUO-NEB) 0 5-2 5 mg/3 mL nebulizer solution    levothyroxine 150 mcg tablet    lisinopril (ZESTRIL) 10 mg tablet    metoprolol tartrate (LOPRESSOR) 50 mg tablet    Sod Picosulfate-Mag Ox-Cit Acd (Clenpiq) 10-3 5-12 MG-GM -GM/160ML SOLN    Allergies   Allergen Reactions    Metoclopramide Anxiety       Objective     /76   Pulse 81   Temp 98 1 °F (36 7 °C) (Oral)   Resp 18   SpO2 92%     PHYSICAL EXAM    Gen: NAD AAOx3  Head: Normocephalic, Atraumatic  CV: S1S2 RRR no m/r/g  CHEST: Clear b/l no c/r/w  ABD: soft, +BS NT/ND  EXT: no edema    ASSESSMENT/PLAN:  This is a 78y o  year old male here for  colonoscopy, and he is stable and optimized for his procedure

## 2022-02-02 NOTE — ANESTHESIA POSTPROCEDURE EVALUATION
Post-Op Assessment Note    CV Status:  Stable    Pain management: adequate     Mental Status:  Alert and awake   Hydration Status:  Euvolemic   PONV Controlled:  Controlled   Airway Patency:  Patent      Post Op Vitals Reviewed: Yes      Staff: CRNA, Anesthesiologist         No complications documented      /73 (02/02/22 1019)    Temp 98 °F (36 7 °C) (02/02/22 1019)    Pulse 78 (02/02/22 1019)   Resp 20 (02/02/22 1019)    SpO2 97 % (02/02/22 1019)

## 2022-03-07 ENCOUNTER — LAB (OUTPATIENT)
Dept: LAB | Facility: HOSPITAL | Age: 67
End: 2022-03-07
Attending: INTERNAL MEDICINE
Payer: COMMERCIAL

## 2022-03-07 DIAGNOSIS — E03.8 OTHER SPECIFIED HYPOTHYROIDISM: ICD-10-CM

## 2022-03-07 LAB
T4 FREE SERPL-MCNC: 0.74 NG/DL (ref 0.76–1.46)
TSH SERPL DL<=0.05 MIU/L-ACNC: 17.2 UIU/ML (ref 0.36–3.74)

## 2022-03-07 PROCEDURE — 84443 ASSAY THYROID STIM HORMONE: CPT

## 2022-03-07 PROCEDURE — 84439 ASSAY OF FREE THYROXINE: CPT

## 2022-03-07 PROCEDURE — 36415 COLL VENOUS BLD VENIPUNCTURE: CPT

## 2022-03-08 ENCOUNTER — OFFICE VISIT (OUTPATIENT)
Dept: FAMILY MEDICINE CLINIC | Facility: HOSPITAL | Age: 67
End: 2022-03-08
Payer: COMMERCIAL

## 2022-03-08 VITALS
RESPIRATION RATE: 16 BRPM | HEIGHT: 71 IN | WEIGHT: 285 LBS | HEART RATE: 78 BPM | DIASTOLIC BLOOD PRESSURE: 64 MMHG | SYSTOLIC BLOOD PRESSURE: 112 MMHG | BODY MASS INDEX: 39.9 KG/M2 | OXYGEN SATURATION: 94 %

## 2022-03-08 DIAGNOSIS — E66.01 SEVERE OBESITY (BMI 35.0-39.9) WITH COMORBIDITY (HCC): ICD-10-CM

## 2022-03-08 DIAGNOSIS — I10 ESSENTIAL HYPERTENSION: ICD-10-CM

## 2022-03-08 DIAGNOSIS — F33.9 DEPRESSION, RECURRENT (HCC): ICD-10-CM

## 2022-03-08 DIAGNOSIS — E03.8 OTHER SPECIFIED HYPOTHYROIDISM: Primary | ICD-10-CM

## 2022-03-08 DIAGNOSIS — N18.32 STAGE 3B CHRONIC KIDNEY DISEASE (HCC): ICD-10-CM

## 2022-03-08 DIAGNOSIS — G47.33 MILD OBSTRUCTIVE SLEEP APNEA: ICD-10-CM

## 2022-03-08 PROBLEM — T88.4XXA DIFFICULT INTUBATION: Status: RESOLVED | Noted: 2022-02-02 | Resolved: 2022-03-08

## 2022-03-08 PROBLEM — M75.81 TENDINITIS OF RIGHT ROTATOR CUFF: Status: RESOLVED | Noted: 2020-04-15 | Resolved: 2022-03-08

## 2022-03-08 PROBLEM — R06.00 DYSPNEA ON EXERTION: Status: RESOLVED | Noted: 2020-12-10 | Resolved: 2022-03-08

## 2022-03-08 PROBLEM — R06.09 DYSPNEA ON EXERTION: Status: RESOLVED | Noted: 2020-12-10 | Resolved: 2022-03-08

## 2022-03-08 PROCEDURE — 99214 OFFICE O/P EST MOD 30 MIN: CPT | Performed by: INTERNAL MEDICINE

## 2022-03-08 NOTE — ASSESSMENT & PLAN NOTE
Patient has hypothyroidism  He does not take levothyroxine every day  TSH was than 10  I asked patient to take levothyroxine every morning before breakfast   I will recheck TSH in 4 weeks and will increase the dose of levothyroxine if TSH is still high

## 2022-03-08 NOTE — ASSESSMENT & PLAN NOTE
Lab Results   Component Value Date    EGFR 36 01/28/2022    EGFR 44 11/30/2021    EGFR 40 04/10/2021    CREATININE 1 85 (H) 01/28/2022    CREATININE 1 60 (H) 11/30/2021    CREATININE 1 74 (H) 04/10/2021     Patient has stage IIIB chronic kidney disease  He denies obstructive urinary complaints  Continue lisinopril  I will check his renal function in 3 months

## 2022-03-08 NOTE — ASSESSMENT & PLAN NOTE
Patient has chronic hypertension  Blood pressure is controlled  Tolerates lisinopril without cough    Continue lisinopril, Lasix, metoprolol

## 2022-03-08 NOTE — PROGRESS NOTES
Assessment/Plan:    Mild obstructive sleep apnea  cpap machine wakes pt up, he doesn't use it consistently  Will refer sleep specialist as pt's cpap is on the recall list and setting have not been checked for a while    Other specified hypothyroidism  Patient has hypothyroidism  He does not take levothyroxine every day  TSH was than 10  I asked patient to take levothyroxine every morning before breakfast   I will recheck TSH in 4 weeks and will increase the dose of levothyroxine if TSH is still high  Essential hypertension  Patient has chronic hypertension  Blood pressure is controlled  Tolerates lisinopril without cough  Continue lisinopril, Lasix, metoprolol    Stage 3b chronic kidney disease  Lab Results   Component Value Date    EGFR 36 01/28/2022    EGFR 44 11/30/2021    EGFR 40 04/10/2021    CREATININE 1 85 (H) 01/28/2022    CREATININE 1 60 (H) 11/30/2021    CREATININE 1 74 (H) 04/10/2021     Patient has stage IIIB chronic kidney disease  He denies obstructive urinary complaints  Continue lisinopril  I will check his renal function in 3 months  Depression, recurrent ([facility] )  Patient has recurrent depression  His symptoms are very mild currently  He denies any suicidal ideation  We discussed medications and decided not to start medications  Will let me know if his mood worsens and then I will start SSRI/SNRI  I told patient that I would prefer to have his sleep apnea under control before starting SSRI  Diagnoses and all orders for this visit:    Other specified hypothyroidism  -     TSH, 3rd generation with Free T4 reflex; Future    Mild obstructive sleep apnea  -     Ambulatory Referral to Sleep Medicine; Future    Severe obesity (BMI 35 0-39  9) with comorbidity ([facility] )  -     Ambulatory referral to Nutrition Services; Future    Depression, recurrent (HCC)    Stage 3b chronic kidney disease ([facility] )  -     Basic metabolic panel;  Future    Essential hypertension        Patient has severe obesity  We discussed diet and exercise  He rides his stationary bike 2-3 times a week for 20-30 minutes  I encouraged him to continue doing this  We discussed the decreasing calorie can take and decreasing portion sizes  I also offered referral to our bariatric program   Patient elected to see a nutritionist 1st       Subjective:      Patient ID: Sherry Alcazar is a 78 y o  male  HPI      The following portions of the patient's history were reviewed and updated as appropriate: current medications, past family history, past medical history, past social history, past surgical history and problem list     Review of Systems      Objective:    /64   Pulse 78   Resp 16   Ht 5' 11" (1 803 m)   Wt 129 kg (285 lb)   SpO2 94%   BMI 39 75 kg/m²      Physical Exam  HENT:      Head: Normocephalic  Eyes:      Conjunctiva/sclera: Conjunctivae normal    Cardiovascular:      Rate and Rhythm: Normal rate and regular rhythm  Heart sounds: No murmur heard  Pulmonary:      Effort: No respiratory distress  Breath sounds: No wheezing or rales  Abdominal:      General: Bowel sounds are normal       Palpations: Abdomen is soft  Tenderness: There is no abdominal tenderness  Musculoskeletal:      Cervical back: Neck supple  Skin:     General: Skin is warm and dry  Comments: Patient no lower extremity edema   Neurological:      Mental Status: He is alert and oriented to person, place, and time  Mental status is at baseline  Motor: No weakness  Gait: Gait normal    Psychiatric:         Mood and Affect: Mood normal          Thought Content: Thought content normal          Judgment: Judgment normal              Desire Terrell MD  BMI Counseling: Body mass index is 39 75 kg/m²  The BMI is above normal  Nutrition recommendations include reducing portion sizes and decreasing overall calorie intake  Exercise recommendations include moderate aerobic physical activity for 150 minutes/week

## 2022-03-08 NOTE — ASSESSMENT & PLAN NOTE
Patient has recurrent depression  His symptoms are very mild currently  He denies any suicidal ideation  We discussed medications and decided not to start medications  Will let me know if his mood worsens and then I will start SSRI/SNRI  I told patient that I would prefer to have his sleep apnea under control before starting SSRI

## 2022-07-20 ENCOUNTER — OFFICE VISIT (OUTPATIENT)
Dept: FAMILY MEDICINE CLINIC | Facility: HOSPITAL | Age: 67
End: 2022-07-20
Payer: COMMERCIAL

## 2022-07-20 ENCOUNTER — LAB (OUTPATIENT)
Dept: LAB | Facility: HOSPITAL | Age: 67
End: 2022-07-20
Attending: INTERNAL MEDICINE
Payer: COMMERCIAL

## 2022-07-20 VITALS
OXYGEN SATURATION: 93 % | RESPIRATION RATE: 16 BRPM | HEART RATE: 72 BPM | DIASTOLIC BLOOD PRESSURE: 96 MMHG | WEIGHT: 280 LBS | HEIGHT: 71 IN | BODY MASS INDEX: 39.2 KG/M2 | SYSTOLIC BLOOD PRESSURE: 136 MMHG

## 2022-07-20 DIAGNOSIS — J20.9 ACUTE BRONCHITIS WITH BRONCHOSPASM: ICD-10-CM

## 2022-07-20 DIAGNOSIS — I10 ESSENTIAL HYPERTENSION: Primary | ICD-10-CM

## 2022-07-20 DIAGNOSIS — E03.8 OTHER SPECIFIED HYPOTHYROIDISM: ICD-10-CM

## 2022-07-20 DIAGNOSIS — I10 ESSENTIAL HYPERTENSION: ICD-10-CM

## 2022-07-20 DIAGNOSIS — G47.33 MILD OBSTRUCTIVE SLEEP APNEA: ICD-10-CM

## 2022-07-20 LAB
ALBUMIN SERPL BCP-MCNC: 3.2 G/DL (ref 3.5–5)
ALP SERPL-CCNC: 61 U/L (ref 46–116)
ALT SERPL W P-5'-P-CCNC: 26 U/L (ref 12–78)
ANION GAP SERPL CALCULATED.3IONS-SCNC: 5 MMOL/L (ref 4–13)
AST SERPL W P-5'-P-CCNC: 14 U/L (ref 5–45)
BILIRUB SERPL-MCNC: 0.46 MG/DL (ref 0.2–1)
BUN SERPL-MCNC: 20 MG/DL (ref 5–25)
CALCIUM ALBUM COR SERPL-MCNC: 9.2 MG/DL (ref 8.3–10.1)
CALCIUM SERPL-MCNC: 8.6 MG/DL (ref 8.3–10.1)
CHLORIDE SERPL-SCNC: 106 MMOL/L (ref 96–108)
CO2 SERPL-SCNC: 31 MMOL/L (ref 21–32)
CREAT SERPL-MCNC: 1.59 MG/DL (ref 0.6–1.3)
ERYTHROCYTE [DISTWIDTH] IN BLOOD BY AUTOMATED COUNT: 13 % (ref 11.6–15.1)
GFR SERPL CREATININE-BSD FRML MDRD: 44 ML/MIN/1.73SQ M
GLUCOSE SERPL-MCNC: 94 MG/DL (ref 65–140)
HCT VFR BLD AUTO: 55.1 % (ref 36.5–49.3)
HGB BLD-MCNC: 16.3 G/DL (ref 12–17)
MCH RBC QN AUTO: 28.3 PG (ref 26.8–34.3)
MCHC RBC AUTO-ENTMCNC: 29.6 G/DL (ref 31.4–37.4)
MCV RBC AUTO: 96 FL (ref 82–98)
PLATELET # BLD AUTO: 216 THOUSANDS/UL (ref 149–390)
PMV BLD AUTO: 12.5 FL (ref 8.9–12.7)
POTASSIUM SERPL-SCNC: 4.5 MMOL/L (ref 3.5–5.3)
PROT SERPL-MCNC: 7 G/DL (ref 6.4–8.4)
RBC # BLD AUTO: 5.75 MILLION/UL (ref 3.88–5.62)
SODIUM SERPL-SCNC: 142 MMOL/L (ref 135–147)
T4 FREE SERPL-MCNC: 0.64 NG/DL (ref 0.76–1.46)
TSH SERPL DL<=0.05 MIU/L-ACNC: 23.2 UIU/ML (ref 0.45–4.5)
WBC # BLD AUTO: 6.95 THOUSAND/UL (ref 4.31–10.16)

## 2022-07-20 PROCEDURE — 36415 COLL VENOUS BLD VENIPUNCTURE: CPT

## 2022-07-20 PROCEDURE — 84439 ASSAY OF FREE THYROXINE: CPT

## 2022-07-20 PROCEDURE — 80053 COMPREHEN METABOLIC PANEL: CPT

## 2022-07-20 PROCEDURE — 84443 ASSAY THYROID STIM HORMONE: CPT

## 2022-07-20 PROCEDURE — 99214 OFFICE O/P EST MOD 30 MIN: CPT | Performed by: INTERNAL MEDICINE

## 2022-07-20 PROCEDURE — 85027 COMPLETE CBC AUTOMATED: CPT

## 2022-07-20 RX ORDER — LISINOPRIL 20 MG/1
20 TABLET ORAL DAILY
Qty: 90 TABLET | Refills: 1 | Status: SHIPPED | OUTPATIENT
Start: 2022-07-20

## 2022-07-20 RX ORDER — IPRATROPIUM BROMIDE AND ALBUTEROL SULFATE 2.5; .5 MG/3ML; MG/3ML
3 SOLUTION RESPIRATORY (INHALATION) EVERY 8 HOURS PRN
Qty: 270 ML | Refills: 5 | Status: SHIPPED | OUTPATIENT
Start: 2022-07-20

## 2022-07-20 RX ORDER — METOPROLOL SUCCINATE 100 MG/1
100 TABLET, EXTENDED RELEASE ORAL DAILY
Qty: 90 TABLET | Refills: 3 | Status: SHIPPED | OUTPATIENT
Start: 2022-07-20

## 2022-07-20 NOTE — ASSESSMENT & PLAN NOTE
Patient has obstructive sleep apnea  He feels tired and does not get a good sleep  He frequently bites his tongue during sleep  He does not use his CPAP machine  I offered referral to sleep specialist but he would like to hold off on that

## 2022-07-20 NOTE — PROGRESS NOTES
Assessment/Plan:    Essential hypertension  Patient has chronic hypertension  Blood pressure is not at goal   He admits to missing the medications sometimes for a day or 2  I increased dose of lisinopril to 20 mg daily, I switched Lopressor to Toprol 100 mg daily  Patient did not have the blood work done that I ordered last time  I will recheck his electrolytes, renal function, CBC and TSH    I will see him back in October for follow-up and Medicare wellness visit      Mild obstructive sleep apnea  Patient has obstructive sleep apnea  He feels tired and does not get a good sleep  He frequently bites his tongue during sleep  He does not use his CPAP machine  I offered referral to sleep specialist but he would like to hold off on that  Other specified hypothyroidism  Patient has hypothyroidism  He is on levothyroxine  Heating get the TSH test done that I ordered last time  I reordered TSH       Diagnoses and all orders for this visit:    Essential hypertension  -     CBC; Future  -     Comprehensive metabolic panel; Future  -     metoprolol succinate (Toprol XL) 100 mg 24 hr tablet; Take 1 tablet (100 mg total) by mouth daily  -     lisinopril (ZESTRIL) 20 mg tablet; Take 1 tablet (20 mg total) by mouth daily    Mild obstructive sleep apnea    Other specified hypothyroidism  -     TSH, 3rd generation with Free T4 reflex; Future    Acute bronchitis with bronchospasm  -     ipratropium-albuterol (DUO-NEB) 0 5-2 5 mg/3 mL nebulizer solution; Take 3 mL by nebulization every 8 (eight) hours as needed for wheezing or shortness of breath          Subjective:      Patient ID: Pedro Sage is a 78 y o  male  HPI    Patient presents for follow-up of chronic conditions as detailed in the assessment and plan        The following portions of the patient's history were reviewed and updated as appropriate: current medications, past family history, past medical history, past social history, past surgical history and problem list     Review of Systems      Objective:    /96   Pulse 72   Resp 16   Ht 5' 11" (1 803 m)   Wt 127 kg (280 lb)   SpO2 93%   BMI 39 05 kg/m²      Physical Exam  HENT:      Head: Normocephalic  Mouth/Throat:      Mouth: Mucous membranes are moist       Pharynx: No oropharyngeal exudate  Comments: Patient does have evidence tongue bite on left side of his tongue  Eyes:      Conjunctiva/sclera: Conjunctivae normal    Cardiovascular:      Rate and Rhythm: Normal rate and regular rhythm  Heart sounds: No murmur heard  Pulmonary:      Effort: No respiratory distress  Breath sounds: No wheezing or rales  Abdominal:      General: Bowel sounds are normal       Palpations: Abdomen is soft  Tenderness: There is no abdominal tenderness  Musculoskeletal:         General: No tenderness  Cervical back: Neck supple  Skin:     General: Skin is warm and dry  Comments: Patient has no lower extremity edema   Neurological:      Mental Status: He is alert and oriented to person, place, and time  Cranial Nerves: No cranial nerve deficit  Motor: No weakness        Gait: Gait normal    Psychiatric:         Mood and Affect: Mood normal              Jennifer Chauhan MD

## 2022-07-20 NOTE — ASSESSMENT & PLAN NOTE
Patient has hypothyroidism  He is on levothyroxine  Heating get the TSH test done that I ordered last time    I reordered TSH

## 2022-07-20 NOTE — RESULT ENCOUNTER NOTE
Call patient: Aditi Hughes that his thyroid function test is markedly abnormal: I need to know if he has been taking is thyroid medication every day or not   Electrolytes, kidney fxn are stable

## 2022-07-20 NOTE — ASSESSMENT & PLAN NOTE
Patient has chronic hypertension  Blood pressure is not at goal   He admits to missing the medications sometimes for a day or 2  I increased dose of lisinopril to 20 mg daily, I switched Lopressor to Toprol 100 mg daily  Patient did not have the blood work done that I ordered last time    I will recheck his electrolytes, renal function, CBC and TSH    I will see him back in October for follow-up and Medicare wellness visit

## 2022-07-21 ENCOUNTER — TELEPHONE (OUTPATIENT)
Dept: FAMILY MEDICINE CLINIC | Facility: HOSPITAL | Age: 67
End: 2022-07-21

## 2022-07-21 NOTE — PROGRESS NOTES
I called patient and made him aware of the results of the CMP and CBC    I left a message on his voicemail call me back and tell me if he had been taking his levothyroxine missed doses

## 2022-07-22 DIAGNOSIS — E03.8 OTHER SPECIFIED HYPOTHYROIDISM: Primary | ICD-10-CM

## 2022-07-22 RX ORDER — LEVOTHYROXINE SODIUM 0.15 MG/1
150 TABLET ORAL EVERY MORNING
Qty: 90 TABLET | Refills: 1 | Status: SHIPPED | OUTPATIENT
Start: 2022-07-22

## 2022-07-22 NOTE — PROGRESS NOTES
I spoke with patient on the phone  He was not taking his levothyroxine every day  I stressed importance of taking levothyroxine every day and I sent a new prescription to his pharmacy    I told him that I ordered a repeat TSH in 6 weeks to document improvement in his thyroid function

## 2022-07-22 NOTE — PROGRESS NOTES
I called pt and left message on his voice mail to call me back regarding how he takes his thyroid medication

## 2022-07-28 DIAGNOSIS — I10 ESSENTIAL HYPERTENSION: ICD-10-CM

## 2022-07-28 RX ORDER — FUROSEMIDE 40 MG/1
TABLET ORAL
Qty: 60 TABLET | Refills: 0 | Status: SHIPPED | OUTPATIENT
Start: 2022-07-28 | End: 2022-09-17

## 2022-08-19 NOTE — PROGRESS NOTES
Assessment/Plan:    Essential hypertension  BP is stable, continue lisinopril/hctz  Pt claims he had BMP done last week, I found no results    Other specified hypothyroidism  On levothyroxine, will check tsh    Mild obstructive sleep apnea  Uses cpap, with improvement in fatigue       Diagnoses and all orders for this visit:    Wheezing  -     Complete PFT with Methacholine Challenge; Future  -     albuterol (2 5 mg/3 mL) 0 083 % nebulizer solution; Take 1 vial (2 5 mg total) by nebulization every 6 (six) hours as needed for wheezing or shortness of breath    Essential hypertension  -     CBC; Future  -     Lipid panel; Future  -     Basic metabolic panel; Future    Other specified hypothyroidism  -     TSH, 3rd generation with Free T4 reflex; Future    Mild obstructive sleep apnea        I'm concerned about asthma attacks, pt has not had a formal diagnosis  Subjective:      Patient ID: Radha Wyatt is a 71 y o  male  cxr on 12/05 showed no pulmonary infiltrates or masses! Shortness of Breath   This is a chronic problem  Episode onset: couple of years ago! The problem occurs intermittently  The problem has been unchanged  Associated symptoms include wheezing  Pertinent negatives include no abdominal pain, chest pain, fever or rhinorrhea  Exacerbated by: at times warm weather, at times cold weather triggers it  Associated symptoms comments: Coughing and a feeling to need to clear the throat    Treatments tried: albuterol nebulizer  The treatment provided significant relief  Hypertension   This is a chronic problem  The current episode started more than 1 year ago  The problem is unchanged  The problem is controlled  Associated symptoms include shortness of breath  Pertinent negatives include no chest pain           The following portions of the patient's history were reviewed and updated as appropriate: current medications, past family history, past medical history, past social history, past surgical history and problem list     Review of Systems   Constitutional: Negative for fever  HENT: Negative for rhinorrhea  Respiratory: Positive for shortness of breath and wheezing  Cardiovascular: Negative for chest pain  Gastrointestinal: Negative for abdominal pain and blood in stool  Genitourinary: Negative for difficulty urinating and hematuria  Psychiatric/Behavioral: Positive for dysphoric mood (due to wife's death in June)  Objective:    /78   Pulse 72   Resp 14   Ht 5' 11" (1 803 m)   Wt 122 kg (269 lb)   BMI 37 52 kg/m²      Physical Exam   Constitutional: He is oriented to person, place, and time  He appears well-developed  No distress  HENT:   Head: Normocephalic  Mouth/Throat: Oropharynx is clear and moist    Eyes: Conjunctivae are normal    Neck: Neck supple  Cardiovascular: Normal rate and regular rhythm  Pulmonary/Chest: Effort normal  No respiratory distress  He has no wheezes  He has no rales  Abdominal: Soft  Bowel sounds are normal  He exhibits no distension  There is no tenderness  Lymphadenopathy:     He has no cervical adenopathy  Neurological: He is alert and oriented to person, place, and time  No cranial nerve deficit  Skin: Skin is warm and dry  No rash noted  Psychiatric: He has a normal mood and affect  Vitals reviewed  BMI Counseling: Body mass index is 37 52 kg/m²  The BMI is above normal  Nutrition recommendations include decreasing portion sizes  Nicki Huff MD  BMI Counseling: Body mass index is 37 52 kg/m²  The BMI is above normal  Nutrition recommendations include reducing portion sizes  Cephalexin Pregnancy And Lactation Text: This medication is Pregnancy Category B and considered safe during pregnancy.  It is also excreted in breast milk but can be used safely for shorter doses.

## 2022-09-17 DIAGNOSIS — I10 ESSENTIAL HYPERTENSION: ICD-10-CM

## 2022-09-17 RX ORDER — FUROSEMIDE 40 MG/1
TABLET ORAL
Qty: 60 TABLET | Refills: 0 | Status: SHIPPED | OUTPATIENT
Start: 2022-09-17 | End: 2022-10-29

## 2022-10-19 NOTE — ASSESSMENT & PLAN NOTE
Dyspnea on exertion persists, relieved by rest  Will refer to Cardiology for evaluation of cad, angina equivalent? Dyspnea could also be due to deconditioning and severe obesity  We discussed diet and especially cutting down on soft drinks containing high fructose corn syrup which he consumes daily  Fractionation Number (Evaluation): 16

## 2022-10-29 DIAGNOSIS — I10 ESSENTIAL HYPERTENSION: ICD-10-CM

## 2022-10-29 RX ORDER — FUROSEMIDE 40 MG/1
TABLET ORAL
Qty: 60 TABLET | Refills: 0 | Status: SHIPPED | OUTPATIENT
Start: 2022-10-29

## 2022-11-09 ENCOUNTER — TELEPHONE (OUTPATIENT)
Dept: FAMILY MEDICINE CLINIC | Facility: HOSPITAL | Age: 67
End: 2022-11-09

## 2022-11-09 NOTE — TELEPHONE ENCOUNTER
Shante Nina from [facility] called to let us know patient passed away last night  She will be sending over death certificate for Dr Beltran Escobar to sign now  If Dr Beltran Escobar will NOT be signing, Shante Nina would like a call ASAP as she then needs to go through another avenue to get this signed  She said we may need to contact the   The  has talked to patient's family
